# Patient Record
Sex: FEMALE | Race: WHITE | NOT HISPANIC OR LATINO | Employment: FULL TIME | URBAN - METROPOLITAN AREA
[De-identification: names, ages, dates, MRNs, and addresses within clinical notes are randomized per-mention and may not be internally consistent; named-entity substitution may affect disease eponyms.]

---

## 2017-07-23 ENCOUNTER — OFFICE VISIT (OUTPATIENT)
Dept: URGENT CARE | Facility: CLINIC | Age: 62
End: 2017-07-23

## 2017-07-23 DIAGNOSIS — Z11.1 PPD SCREENING TEST: Primary | ICD-10-CM

## 2017-07-23 PROCEDURE — 86580 TB INTRADERMAL TEST: CPT | Mod: S$GLB,,, | Performed by: EMERGENCY MEDICINE

## 2017-07-23 NOTE — PROGRESS NOTES
PPD given Patient will come back Tuesday after 3:00 pm for it to be read.    PPD Reading Note  PPD read and results entered in Epic as below:

## 2017-08-30 ENCOUNTER — OFFICE VISIT (OUTPATIENT)
Dept: URGENT CARE | Facility: CLINIC | Age: 62
End: 2017-08-30
Payer: COMMERCIAL

## 2017-08-30 VITALS
RESPIRATION RATE: 16 BRPM | WEIGHT: 210 LBS | HEART RATE: 68 BPM | TEMPERATURE: 98 F | BODY MASS INDEX: 33.75 KG/M2 | SYSTOLIC BLOOD PRESSURE: 135 MMHG | DIASTOLIC BLOOD PRESSURE: 78 MMHG | OXYGEN SATURATION: 98 % | HEIGHT: 66 IN

## 2017-08-30 DIAGNOSIS — I10 ESSENTIAL HYPERTENSION: Primary | ICD-10-CM

## 2017-08-30 PROCEDURE — 99214 OFFICE O/P EST MOD 30 MIN: CPT | Mod: S$GLB,,, | Performed by: PHYSICIAN ASSISTANT

## 2017-08-30 PROCEDURE — 3008F BODY MASS INDEX DOCD: CPT | Mod: S$GLB,,, | Performed by: PHYSICIAN ASSISTANT

## 2017-08-30 RX ORDER — CHOLECALCIFEROL (VITAMIN D3) 25 MCG
1000 TABLET ORAL DAILY
COMMUNITY
End: 2023-07-28

## 2017-08-30 RX ORDER — DOXYCYCLINE 40 MG/1
40 CAPSULE ORAL DAILY
COMMUNITY
End: 2018-02-13

## 2017-08-31 NOTE — PATIENT INSTRUCTIONS
Taking Your Blood Pressure  Blood pressure is the force of blood against the artery wall as it moves from the heart through the blood vessels. You can take your own blood pressure reading using a digital monitor. Take readings as often as your healthcare provider instructs. Take each reading at the same time of day.  Step 1. Relax    · Take your blood pressure at the same time every day, such as in the morning or evening, or at the time your healthcare provider recommends.  · Wait at least a half-hour after smoking, eating, drinking caffeinated beverages, or exercising.  · Sit comfortably at a table with both feet on the floor. Do not cross your legs or feet. Place the monitor near you.  · Rest for a few minutes before you begin.  Step 2. Wrap the cuff    · Place your arm on the table, palm up. Your arm should be at the level of your heart. Wrap the cuff around your upper arm, just above your elbow. Its best done on bare skin, not over clothing. Most cuffs will indicate where the brachial artery (the blood vessel in the middle of the arm at the inner side of the elbow) should line up with the cuff. Look in your monitor's instruction booklet for an illustration. You can also bring your cuff to your healthcare provider and have them show you how to correctly place the cuff.  · Make sure your cuff fits. If it doesnt wrap around your upper arm, order a larger cuff.  Step 3. Inflate the cuff    · Pump the cuff until the scale reads 160. If you have a self-inflating cuff, push the button that starts the pump.  · The cuff will tighten, then loosen.  · The numbers will change. When they stop changing, your blood pressure reading will appear.  · Take 2 or 3 readings one minute apart.  Step 4. Write down the results of each reading    · Write down your blood pressure numbers for each reading. Note the date and time. Keep your results in one place, such as a notebook. Even if your monitor has a built-in memory, keep a hard  copy of the readings.  · Remove the cuff from your arm. Turn off the machine.  · Share your blood pressure records with your healthcare providers at each visit.  Date Last Reviewed: 4/27/2016  © 9904-7299 [employer]. [address] [place] [address], [place] [address]. All rights reserved. This information is not intended as a substitute for professional medical care. Always follow your healthcare professional's instructions.      KEEP FU WITH CARDIOLOGIST AND PCP    START BP DIARY AS DISCUSSED

## 2017-08-31 NOTE — PROGRESS NOTES
Subjective:       Patient ID: Verena Toscano is a 62 y.o. female.    Chief Complaint: Anxiety    Patient states she took her pressure a few days ago and it was 180/100 and today it was 150/90 and a couple of other times it was high also,Patient states she is under a lot of stress because her  has terminal cancer.Patient has appointment with cardiologist on sep 12 th and a appointment with PCP at end of September.       Anxiety   Presents for initial visit. Onset was 1 to 4 weeks ago. The problem has been unchanged. Symptoms include nervous/anxious behavior. Patient reports no chest pain, nausea or shortness of breath. Primary symptoms comment: headache, high blood pressure. Symptoms occur occasionally. The quality of sleep is fair.     Past treatments include nothing.     Review of Systems   Constitution: Negative for chills and fever.   HENT: Positive for headaches. Negative for sore throat.    Eyes: Negative for blurred vision.   Cardiovascular: Negative for chest pain.   Respiratory: Negative for shortness of breath.    Skin: Negative for rash.   Musculoskeletal: Negative for back pain and joint pain.   Gastrointestinal: Negative for abdominal pain, diarrhea, nausea and vomiting.   Psychiatric/Behavioral: The patient is nervous/anxious.        Objective:      Physical Exam   Constitutional: She is oriented to person, place, and time. She appears well-developed and well-nourished. She is cooperative.  Non-toxic appearance. She does not appear ill. No distress.   HENT:   Head: Normocephalic and atraumatic.   Right Ear: Hearing, tympanic membrane, external ear and ear canal normal.   Left Ear: Hearing, tympanic membrane, external ear and ear canal normal.   Nose: Nose normal. No mucosal edema, rhinorrhea or nasal deformity. No epistaxis. Right sinus exhibits no maxillary sinus tenderness and no frontal sinus tenderness. Left sinus exhibits no maxillary sinus tenderness and no frontal sinus tenderness.    Mouth/Throat: Uvula is midline, oropharynx is clear and moist and mucous membranes are normal. No trismus in the jaw. Normal dentition. No uvula swelling. No posterior oropharyngeal erythema.   Eyes: Conjunctivae and lids are normal. Right eye exhibits no discharge. Left eye exhibits no discharge. No scleral icterus.   Sclera clear bilat   Neck: Trachea normal, normal range of motion, full passive range of motion without pain and phonation normal. Neck supple.   Cardiovascular: Normal rate, regular rhythm, normal heart sounds, intact distal pulses and normal pulses.    Pulmonary/Chest: Effort normal and breath sounds normal. No respiratory distress.   Abdominal: Soft. Normal appearance and bowel sounds are normal. She exhibits no distension, no pulsatile midline mass and no mass. There is no tenderness.   Musculoskeletal: Normal range of motion. She exhibits no edema or deformity.   Neurological: She is alert and oriented to person, place, and time. She exhibits normal muscle tone. Coordination normal.   Skin: Skin is warm, dry and intact. She is not diaphoretic. No pallor.   Psychiatric: She has a normal mood and affect. Her speech is normal and behavior is normal. Judgment and thought content normal. Cognition and memory are normal.   Nursing note and vitals reviewed.      Assessment:       1. Essential hypertension        Plan:       Verena was seen today for anxiety.    Diagnoses and all orders for this visit:    Essential hypertension    NO OFFICIAL DIAGNOSIS OF BP    START BP DIARY AS DISCUSSED FOR FU WITH CARDIOLOGIST ON 9/12

## 2018-02-13 ENCOUNTER — HOSPITAL ENCOUNTER (EMERGENCY)
Facility: HOSPITAL | Age: 63
Discharge: HOME | End: 2018-02-13
Attending: FAMILY MEDICINE
Payer: COMMERCIAL

## 2018-02-13 VITALS
RESPIRATION RATE: 16 BRPM | OXYGEN SATURATION: 99 % | WEIGHT: 205 LBS | TEMPERATURE: 98 F | BODY MASS INDEX: 32.95 KG/M2 | SYSTOLIC BLOOD PRESSURE: 125 MMHG | DIASTOLIC BLOOD PRESSURE: 58 MMHG | HEART RATE: 56 BPM | HEIGHT: 66 IN

## 2018-02-13 DIAGNOSIS — S09.90XA INJURY OF HEAD, INITIAL ENCOUNTER: Primary | ICD-10-CM

## 2018-02-13 DIAGNOSIS — T14.8XXA SUPERFICIAL LACERATION: ICD-10-CM

## 2018-02-13 PROCEDURE — 99283 EMERGENCY DEPT VISIT LOW MDM: CPT | Mod: ,,,

## 2018-02-13 PROCEDURE — 25000003 PHARM REV CODE 250

## 2018-02-13 PROCEDURE — 99284 EMERGENCY DEPT VISIT MOD MDM: CPT | Mod: 25

## 2018-02-13 RX ORDER — ONDANSETRON 4 MG/1
4 TABLET, ORALLY DISINTEGRATING ORAL EVERY 6 HOURS PRN
Qty: 12 TABLET | Refills: 0 | Status: SHIPPED | OUTPATIENT
Start: 2018-02-13 | End: 2023-07-28

## 2018-02-13 RX ORDER — DOXYCYCLINE 40 MG/1
40 CAPSULE ORAL DAILY
COMMUNITY
End: 2023-07-28

## 2018-02-13 RX ORDER — TRIAMTERENE AND HYDROCHLOROTHIAZIDE 37.5; 25 MG/1; MG/1
1 CAPSULE ORAL EVERY MORNING
COMMUNITY

## 2018-02-13 RX ORDER — ACETAMINOPHEN 500 MG
1000 TABLET ORAL EVERY 6 HOURS PRN
COMMUNITY

## 2018-02-13 RX ORDER — ONDANSETRON 4 MG/1
4 TABLET, ORALLY DISINTEGRATING ORAL
Status: COMPLETED | OUTPATIENT
Start: 2018-02-13 | End: 2018-02-13

## 2018-02-13 RX ADMIN — ONDANSETRON 4 MG: 4 TABLET, ORALLY DISINTEGRATING ORAL at 02:02
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
## 2018-02-13 NOTE — ED NOTES
Patient identifiers verified and correct for MS Toscano  C/C: Laceration, head injury  APPEARANCE: awake and alert in NAD.  SKIN: warm, dry. Laceration to top right head.  MUSCULOSKELETAL: Patient moving all extremities spontaneously, no obvious swelling or deformities noted. Ambulates independently.  RESPIRATORY: Denies shortness of breath.Respirations unlabored.   CARDIAC: Denies CP, 2+ distal pulses; no peripheral edema  ABDOMEN: S/ND/NT, Positive nausea, no emesis  : voids spontaneously, denies difficulty  Neurologic: AAO x 4; follows commands equal strength in all extremities; denies numbness/tingling. Denies dizziness Positive weakness, positive headache, right neck pain

## 2018-07-03 ENCOUNTER — CLINICAL SUPPORT (OUTPATIENT)
Dept: OCCUPATIONAL MEDICINE | Facility: CLINIC | Age: 63
End: 2018-07-03

## 2018-07-03 DIAGNOSIS — Z11.1 PPD SCREENING TEST: Primary | ICD-10-CM

## 2018-07-03 PROCEDURE — 86580 TB INTRADERMAL TEST: CPT | Mod: S$GLB,,, | Performed by: EMERGENCY MEDICINE

## 2018-07-05 LAB
TB INDURATION - 48 HR READ: NORMAL MM
TB INDURATION - 72 HR READ: NORMAL MM
TB SKIN TEST - 48 HR READ: NORMAL
TB SKIN TEST - 72 HR READ: NEGATIVE

## 2019-06-22 ENCOUNTER — CLINICAL SUPPORT (OUTPATIENT)
Dept: URGENT CARE | Facility: CLINIC | Age: 64
End: 2019-06-22

## 2019-06-22 DIAGNOSIS — Z11.1 SCREENING EXAMINATION FOR PULMONARY TUBERCULOSIS: Primary | ICD-10-CM

## 2019-06-22 PROCEDURE — 86580 POCT TB SKIN TEST: ICD-10-PCS | Mod: S$GLB,,, | Performed by: EMERGENCY MEDICINE

## 2019-06-22 PROCEDURE — 86580 TB INTRADERMAL TEST: CPT | Mod: S$GLB,,, | Performed by: EMERGENCY MEDICINE

## 2019-10-03 ENCOUNTER — OFFICE VISIT (OUTPATIENT)
Dept: URGENT CARE | Facility: CLINIC | Age: 64
End: 2019-10-03
Payer: MEDICARE

## 2019-10-03 VITALS
TEMPERATURE: 97 F | HEART RATE: 65 BPM | HEIGHT: 66 IN | BODY MASS INDEX: 33.75 KG/M2 | OXYGEN SATURATION: 98 % | WEIGHT: 210 LBS | DIASTOLIC BLOOD PRESSURE: 77 MMHG | RESPIRATION RATE: 19 BRPM | SYSTOLIC BLOOD PRESSURE: 143 MMHG

## 2019-10-03 DIAGNOSIS — S60.222A CONTUSION OF LEFT HAND, INITIAL ENCOUNTER: ICD-10-CM

## 2019-10-03 DIAGNOSIS — S69.92XA INJURY OF LEFT HAND, INITIAL ENCOUNTER: ICD-10-CM

## 2019-10-03 DIAGNOSIS — S99.922A INJURY OF LEFT FOOT, INITIAL ENCOUNTER: Primary | ICD-10-CM

## 2019-10-03 DIAGNOSIS — S93.602A FOOT SPRAIN, LEFT, INITIAL ENCOUNTER: ICD-10-CM

## 2019-10-03 PROCEDURE — 99214 OFFICE O/P EST MOD 30 MIN: CPT | Mod: S$GLB,,, | Performed by: INTERNAL MEDICINE

## 2019-10-03 PROCEDURE — 73130 XR HAND COMPLETE 3 VIEW LEFT: ICD-10-PCS | Mod: FY,LT,S$GLB, | Performed by: RADIOLOGY

## 2019-10-03 PROCEDURE — 73130 X-RAY EXAM OF HAND: CPT | Mod: FY,LT,S$GLB, | Performed by: RADIOLOGY

## 2019-10-03 PROCEDURE — 73630 XR FOOT COMPLETE 3 VIEW LEFT: ICD-10-PCS | Mod: FY,LT,S$GLB, | Performed by: RADIOLOGY

## 2019-10-03 PROCEDURE — 99214 PR OFFICE/OUTPT VISIT, EST, LEVL IV, 30-39 MIN: ICD-10-PCS | Mod: S$GLB,,, | Performed by: INTERNAL MEDICINE

## 2019-10-03 PROCEDURE — 73630 X-RAY EXAM OF FOOT: CPT | Mod: FY,LT,S$GLB, | Performed by: RADIOLOGY

## 2019-10-03 RX ORDER — NAPROXEN 500 MG/1
500 TABLET ORAL 2 TIMES DAILY WITH MEALS
Qty: 20 TABLET | Refills: 2 | Status: SHIPPED | OUTPATIENT
Start: 2019-10-03 | End: 2019-10-13

## 2019-10-03 RX ORDER — OMEPRAZOLE 10 MG/1
10 CAPSULE, DELAYED RELEASE ORAL DAILY
COMMUNITY

## 2019-10-03 NOTE — PROGRESS NOTES
"Subjective:       Patient ID: Verena Toscano is a 64 y.o. female.    Vitals:  height is 5' 6" (1.676 m) and weight is 95.3 kg (210 lb). Her oral temperature is 96.9 °F (36.1 °C). Her blood pressure is 143/77 (abnormal) and her pulse is 65. Her respiration is 19 and oxygen saturation is 98%.     Chief Complaint: Foot Injury    Patient fell this morning and hurt left foot as well as a bruise on left hand    Foot Injury    The incident occurred 1 to 3 hours ago. The incident occurred in the street. There was no injury mechanism. The pain is present in the left foot. The quality of the pain is described as aching. The pain is moderate. The pain has been constant since onset. Pertinent negatives include no inability to bear weight, loss of motion, loss of sensation, muscle weakness, numbness or tingling. She reports no foreign bodies present. The symptoms are aggravated by weight bearing. She has tried ice and acetaminophen for the symptoms. The treatment provided mild relief.       Constitution: Negative for chills, fatigue and fever.   HENT: Negative for congestion and sore throat.    Neck: Negative for painful lymph nodes.   Cardiovascular: Negative for chest pain and leg swelling.   Eyes: Negative for double vision and blurred vision.   Respiratory: Negative for cough and shortness of breath.    Gastrointestinal: Negative for nausea, vomiting and diarrhea.   Genitourinary: Negative for dysuria, frequency, urgency and history of kidney stones.   Musculoskeletal: Negative for joint pain, joint swelling, muscle cramps and muscle ache.   Skin: Negative for color change, pale, rash and bruising.   Allergic/Immunologic: Negative for seasonal allergies.   Neurological: Negative for dizziness, history of vertigo, light-headedness, passing out, headaches and numbness.   Hematologic/Lymphatic: Negative for swollen lymph nodes.   Psychiatric/Behavioral: Negative for nervous/anxious, sleep disturbance and depression. The " patient is not nervous/anxious.        Objective:      Physical Exam   Constitutional: She appears well-developed and well-nourished.   HENT:   Head: Normocephalic and atraumatic.   Eyes: Pupils are equal, round, and reactive to light. Conjunctivae and EOM are normal.   Neck: Normal range of motion. Neck supple.   Cardiovascular: Intact distal pulses.   Musculoskeletal:   Left foot tenderness distal 1st metatarsal; ecchymosis and swelling and tenderness prox phalanx L middle fonger   Nursing note and vitals reviewed.      Assessment:       1. Injury of left foot, initial encounter    2. Injury of left hand, initial encounter    3. Contusion of left hand, initial encounter    4. Foot sprain, left, initial encounter        Plan:         Injury of left foot, initial encounter  -     X-Ray Foot Complete 3 view Left; Future; Expected date: 10/03/2019    Injury of left hand, initial encounter  -     XR HAND COMPLETE 3 VIEW LEFT; Future; Expected date: 10/03/2019    Contusion of left hand, initial encounter    Foot sprain, left, initial encounter  -     NON-PNEUMATIC WALKING BOOT FOR HOME USE  -     naproxen (NAPROSYN) 500 MG tablet; Take 1 tablet (500 mg total) by mouth 2 (two) times daily with meals. for 10 days  Dispense: 20 tablet; Refill: 2

## 2020-06-18 ENCOUNTER — CLINICAL SUPPORT (OUTPATIENT)
Dept: URGENT CARE | Facility: CLINIC | Age: 65
End: 2020-06-18

## 2020-06-18 VITALS — HEART RATE: 88 BPM | OXYGEN SATURATION: 98 % | TEMPERATURE: 98 F

## 2020-06-18 DIAGNOSIS — Z11.1 SCREENING EXAMINATION FOR PULMONARY TUBERCULOSIS: Primary | ICD-10-CM

## 2020-06-18 PROCEDURE — 86580 TB INTRADERMAL TEST: CPT | Mod: S$GLB,,, | Performed by: NURSE PRACTITIONER

## 2020-06-18 PROCEDURE — 86580 POCT TB SKIN TEST: ICD-10-PCS | Mod: S$GLB,,, | Performed by: NURSE PRACTITIONER

## 2020-06-21 LAB
TB INDURATION - 48 HR READ: NORMAL
TB INDURATION - 72 HR READ: 0 MM
TB SKIN TEST - 48 HR READ: NORMAL
TB SKIN TEST - 72 HR READ: NEGATIVE

## 2020-07-12 ENCOUNTER — OFFICE VISIT (OUTPATIENT)
Dept: URGENT CARE | Facility: CLINIC | Age: 65
End: 2020-07-12
Payer: MEDICARE

## 2020-07-12 VITALS
BODY MASS INDEX: 33.75 KG/M2 | DIASTOLIC BLOOD PRESSURE: 76 MMHG | RESPIRATION RATE: 18 BRPM | SYSTOLIC BLOOD PRESSURE: 126 MMHG | TEMPERATURE: 96 F | WEIGHT: 210 LBS | HEIGHT: 66 IN | OXYGEN SATURATION: 98 % | HEART RATE: 60 BPM

## 2020-07-12 DIAGNOSIS — K13.70 ORAL LESION: ICD-10-CM

## 2020-07-12 DIAGNOSIS — K13.70 MOUTH PROBLEM: Primary | ICD-10-CM

## 2020-07-12 PROCEDURE — 99214 PR OFFICE/OUTPT VISIT, EST, LEVL IV, 30-39 MIN: ICD-10-PCS | Mod: S$GLB,,, | Performed by: FAMILY MEDICINE

## 2020-07-12 PROCEDURE — 99214 OFFICE O/P EST MOD 30 MIN: CPT | Mod: S$GLB,,, | Performed by: FAMILY MEDICINE

## 2020-07-12 RX ORDER — LORATADINE 10 MG/1
10 TABLET ORAL DAILY
COMMUNITY

## 2020-07-12 NOTE — PROGRESS NOTES
"Subjective:       Patient ID: Verena Toscano is a 65 y.o. female.    Vitals:  height is 5' 6" (1.676 m) and weight is 95.3 kg (210 lb). Her temperature is 96 °F (35.6 °C). Her blood pressure is 126/76 and her pulse is 60. Her respiration is 18 and oxygen saturation is 98%.     Chief Complaint: Sore Throat    Pt presents complaining of a sore area on the top of her mouth since 07/05. Pt suspects she burned her mouth while eating however the area has not resolved like normally it would. Pt has taken Tylenol.  No fever drainage sinus congestion dental pain.  Area not worsening.     Sore Throat   This is a new problem. Episode onset: 07/05. The problem has been unchanged. Neither side of throat is experiencing more pain than the other. There has been no fever. The pain is moderate. Pertinent negatives include no congestion, coughing, diarrhea, headaches, neck pain, shortness of breath, swollen glands, trouble swallowing or vomiting. She has tried acetaminophen for the symptoms.       Constitution: Negative for chills, fatigue and fever.   HENT: Positive for mouth sores. Negative for tongue pain, congestion, sore throat and trouble swallowing.    Neck: Negative for neck pain and painful lymph nodes.   Cardiovascular: Negative for chest pain and leg swelling.   Eyes: Negative for double vision and blurred vision.   Respiratory: Negative for cough and shortness of breath.    Gastrointestinal: Negative for nausea, vomiting and diarrhea.   Genitourinary: Negative for dysuria, frequency, urgency and history of kidney stones.   Musculoskeletal: Negative for joint pain, joint swelling, muscle cramps and muscle ache.   Skin: Negative for color change, pale, rash and bruising.   Allergic/Immunologic: Negative for seasonal allergies.   Neurological: Negative for dizziness, history of vertigo, light-headedness, passing out and headaches.   Hematologic/Lymphatic: Negative for swollen lymph nodes.   Psychiatric/Behavioral: " Negative for nervous/anxious, sleep disturbance and depression. The patient is not nervous/anxious.        Objective:      Physical Exam   Constitutional: She is oriented to person, place, and time. She appears well-developed. She is cooperative.  Non-toxic appearance. She does not appear ill. No distress.   HENT:   Head: Normocephalic and atraumatic.   Right Ear: Hearing, tympanic membrane, external ear and ear canal normal.   Left Ear: Hearing, tympanic membrane, external ear and ear canal normal.   Nose: Nose normal. No mucosal edema, rhinorrhea or nasal deformity. No epistaxis. Right sinus exhibits no maxillary sinus tenderness and no frontal sinus tenderness. Left sinus exhibits no maxillary sinus tenderness and no frontal sinus tenderness.   Mouth/Throat: Uvula is midline, oropharynx is clear and moist and mucous membranes are normal. Oral lesions present. No trismus in the jaw. Normal dentition. No dental abscesses or uvula swelling. No oropharyngeal exudate, posterior oropharyngeal edema, posterior oropharyngeal erythema or cobblestoning. No tonsillar exudate.       Eyes: Conjunctivae and lids are normal. No scleral icterus.   Neck: Trachea normal, full passive range of motion without pain and phonation normal. Neck supple. No neck rigidity. No edema and no erythema present.   Cardiovascular: Normal rate, regular rhythm, normal heart sounds and normal pulses.   Pulmonary/Chest: Effort normal and breath sounds normal. No respiratory distress. She has no decreased breath sounds. She has no rhonchi.   Abdominal: Normal appearance.   Musculoskeletal: Normal range of motion.         General: No deformity.   Neurological: She is alert and oriented to person, place, and time. She exhibits normal muscle tone. Coordination normal.   Skin: Skin is warm, dry, intact, not diaphoretic and not pale.   Psychiatric: Her speech is normal and behavior is normal. Judgment and thought content normal.   Nursing note and vitals  reviewed.        Assessment:       1. Mouth problem    2. Oral lesion        Plan:         Mouth problem  -     (Magic mouthwash) 1:1:1 Benadryl 12.5mg/5ml liq, aluminum & magnesium hydroxide-simehticone (Maalox), lidocaine viscous 2%; Swish and spit 10 mLs every 4 (four) hours as needed. for sore throat  Dispense: 360 mL; Refill: 0    Oral lesion    unclear why area not healing at this time. No signs of strep or dental abscess. no vesicles, does not appear to be candida. Advised f/u if not improving pt agreeable.     Patient Instructions   PLEASE READ YOUR DISCHARGE INSTRUCTIONS ENTIRELY AS IT CONTAINS IMPORTANT INFORMATION.    Swish and spit the magic mouthwash    Avoid very cold very hot spicy citrus foods    If still not improving please see your dentist for further evaluation    Please return or see your primary care doctor if you develop new or worsening symptoms.     You must understand that you have received an Urgent Care treatment only and that you may be released before all of your medical problems are known or treated.

## 2020-07-12 NOTE — PATIENT INSTRUCTIONS
PLEASE READ YOUR DISCHARGE INSTRUCTIONS ENTIRELY AS IT CONTAINS IMPORTANT INFORMATION.    Swish and spit the magic mouthwash    Avoid very cold very hot spicy citrus foods    If still not improving please see your dentist for further evaluation    Please return or see your primary care doctor if you develop new or worsening symptoms.     You must understand that you have received an Urgent Care treatment only and that you may be released before all of your medical problems are known or treated.

## 2020-07-14 ENCOUNTER — LAB VISIT (OUTPATIENT)
Dept: LAB | Facility: OTHER | Age: 65
End: 2020-07-14
Payer: MEDICARE

## 2020-07-14 DIAGNOSIS — Z03.818 ENCOUNTER FOR OBSERVATION FOR SUSPECTED EXPOSURE TO OTHER BIOLOGICAL AGENTS RULED OUT: ICD-10-CM

## 2020-07-14 PROCEDURE — U0003 INFECTIOUS AGENT DETECTION BY NUCLEIC ACID (DNA OR RNA); SEVERE ACUTE RESPIRATORY SYNDROME CORONAVIRUS 2 (SARS-COV-2) (CORONAVIRUS DISEASE [COVID-19]), AMPLIFIED PROBE TECHNIQUE, MAKING USE OF HIGH THROUGHPUT TECHNOLOGIES AS DESCRIBED BY CMS-2020-01-R: HCPCS

## 2020-07-17 LAB — SARS-COV-2 RNA RESP QL NAA+PROBE: NEGATIVE

## 2020-07-21 ENCOUNTER — LAB VISIT (OUTPATIENT)
Dept: LAB | Facility: OTHER | Age: 65
End: 2020-07-21
Payer: MEDICARE

## 2020-07-21 DIAGNOSIS — Z03.818 ENCOUNTER FOR OBSERVATION FOR SUSPECTED EXPOSURE TO OTHER BIOLOGICAL AGENTS RULED OUT: ICD-10-CM

## 2020-07-21 PROCEDURE — U0003 INFECTIOUS AGENT DETECTION BY NUCLEIC ACID (DNA OR RNA); SEVERE ACUTE RESPIRATORY SYNDROME CORONAVIRUS 2 (SARS-COV-2) (CORONAVIRUS DISEASE [COVID-19]), AMPLIFIED PROBE TECHNIQUE, MAKING USE OF HIGH THROUGHPUT TECHNOLOGIES AS DESCRIBED BY CMS-2020-01-R: HCPCS

## 2020-07-25 LAB — SARS-COV-2 RNA RESP QL NAA+PROBE: NEGATIVE

## 2020-07-28 ENCOUNTER — LAB VISIT (OUTPATIENT)
Dept: LAB | Facility: OTHER | Age: 65
End: 2020-07-28
Attending: INTERNAL MEDICINE
Payer: MEDICARE

## 2020-07-28 DIAGNOSIS — Z20.822 SUSPECTED COVID-19 VIRUS INFECTION: ICD-10-CM

## 2020-07-28 DIAGNOSIS — Z03.818 ENCOUNTER FOR OBSERVATION FOR SUSPECTED EXPOSURE TO OTHER BIOLOGICAL AGENTS RULED OUT: ICD-10-CM

## 2020-07-28 PROCEDURE — U0003 INFECTIOUS AGENT DETECTION BY NUCLEIC ACID (DNA OR RNA); SEVERE ACUTE RESPIRATORY SYNDROME CORONAVIRUS 2 (SARS-COV-2) (CORONAVIRUS DISEASE [COVID-19]), AMPLIFIED PROBE TECHNIQUE, MAKING USE OF HIGH THROUGHPUT TECHNOLOGIES AS DESCRIBED BY CMS-2020-01-R: HCPCS

## 2020-08-03 LAB — SARS-COV-2 RNA RESP QL NAA+PROBE: NEGATIVE

## 2020-08-04 ENCOUNTER — LAB VISIT (OUTPATIENT)
Dept: LAB | Facility: OTHER | Age: 65
End: 2020-08-04
Payer: MEDICARE

## 2020-08-04 DIAGNOSIS — Z03.818 ENCOUNTER FOR OBSERVATION FOR SUSPECTED EXPOSURE TO OTHER BIOLOGICAL AGENTS RULED OUT: ICD-10-CM

## 2020-08-04 DIAGNOSIS — Z20.822 SUSPECTED COVID-19 VIRUS INFECTION: ICD-10-CM

## 2020-08-04 PROCEDURE — U0003 INFECTIOUS AGENT DETECTION BY NUCLEIC ACID (DNA OR RNA); SEVERE ACUTE RESPIRATORY SYNDROME CORONAVIRUS 2 (SARS-COV-2) (CORONAVIRUS DISEASE [COVID-19]), AMPLIFIED PROBE TECHNIQUE, MAKING USE OF HIGH THROUGHPUT TECHNOLOGIES AS DESCRIBED BY CMS-2020-01-R: HCPCS

## 2020-08-05 LAB — SARS-COV-2 RNA RESP QL NAA+PROBE: NOT DETECTED

## 2021-01-02 ENCOUNTER — CLINICAL SUPPORT (OUTPATIENT)
Dept: URGENT CARE | Facility: CLINIC | Age: 66
End: 2021-01-02
Payer: MEDICARE

## 2021-01-02 DIAGNOSIS — Z20.822 EXPOSURE TO COVID-19 VIRUS: Primary | ICD-10-CM

## 2021-01-02 LAB
CTP QC/QA: YES
SARS-COV-2 RDRP RESP QL NAA+PROBE: NEGATIVE

## 2021-01-02 PROCEDURE — U0002 COVID-19 LAB TEST NON-CDC: HCPCS | Mod: QW,CR,S$GLB, | Performed by: EMERGENCY MEDICINE

## 2021-01-02 PROCEDURE — U0002: ICD-10-PCS | Mod: QW,CR,S$GLB, | Performed by: EMERGENCY MEDICINE

## 2021-04-16 ENCOUNTER — PATIENT MESSAGE (OUTPATIENT)
Dept: RESEARCH | Facility: HOSPITAL | Age: 66
End: 2021-04-16

## 2021-07-01 ENCOUNTER — PATIENT MESSAGE (OUTPATIENT)
Dept: ADMINISTRATIVE | Facility: OTHER | Age: 66
End: 2021-07-01

## 2021-07-23 ENCOUNTER — TELEPHONE (OUTPATIENT)
Dept: NEUROLOGY | Facility: CLINIC | Age: 66
End: 2021-07-23

## 2021-09-27 ENCOUNTER — OFFICE VISIT (OUTPATIENT)
Dept: NEUROLOGY | Facility: CLINIC | Age: 66
End: 2021-09-27
Payer: MEDICARE

## 2021-09-27 DIAGNOSIS — R41.9 COGNITIVE COMPLAINTS: ICD-10-CM

## 2021-09-27 DIAGNOSIS — F43.22 ADJUSTMENT DISORDER WITH ANXIOUS MOOD: Primary | ICD-10-CM

## 2021-09-27 PROCEDURE — 90791 PR PSYCHIATRIC DIAGNOSTIC EVALUATION: ICD-10-PCS | Mod: 95,,, | Performed by: PSYCHIATRY & NEUROLOGY

## 2021-09-27 PROCEDURE — 90791 PSYCH DIAGNOSTIC EVALUATION: CPT | Mod: 95,,, | Performed by: PSYCHIATRY & NEUROLOGY

## 2021-09-27 PROCEDURE — 99499 UNLISTED E&M SERVICE: CPT | Mod: 95,,, | Performed by: PSYCHIATRY & NEUROLOGY

## 2021-09-27 PROCEDURE — 99499 NO LOS: ICD-10-PCS | Mod: 95,,, | Performed by: PSYCHIATRY & NEUROLOGY

## 2021-10-04 ENCOUNTER — OFFICE VISIT (OUTPATIENT)
Dept: NEUROLOGY | Facility: CLINIC | Age: 66
End: 2021-10-04
Payer: MEDICARE

## 2021-10-04 DIAGNOSIS — F43.22 ADJUSTMENT DISORDER WITH ANXIOUS MOOD: ICD-10-CM

## 2021-10-04 DIAGNOSIS — R41.9 COGNITIVE COMPLAINTS: Primary | ICD-10-CM

## 2021-10-04 PROCEDURE — 96132 NRPSYC TST EVAL PHYS/QHP 1ST: CPT | Mod: ,,, | Performed by: PSYCHIATRY & NEUROLOGY

## 2021-10-04 PROCEDURE — 99499 UNLISTED E&M SERVICE: CPT | Mod: S$PBB,,, | Performed by: PSYCHIATRY & NEUROLOGY

## 2021-10-04 PROCEDURE — 99499 NO LOS: ICD-10-PCS | Mod: S$PBB,,, | Performed by: PSYCHIATRY & NEUROLOGY

## 2021-10-04 PROCEDURE — 96133 PR NEUROPSYCHOLOGIC TEST EVAL SVCS, EA ADDTL HR: ICD-10-PCS | Mod: ,,, | Performed by: PSYCHIATRY & NEUROLOGY

## 2021-10-04 PROCEDURE — 96133 NRPSYC TST EVAL PHYS/QHP EA: CPT | Mod: ,,, | Performed by: PSYCHIATRY & NEUROLOGY

## 2021-10-04 PROCEDURE — 96139 PSYCL/NRPSYC TST TECH EA: CPT | Mod: ,,, | Performed by: PSYCHIATRY & NEUROLOGY

## 2021-10-04 PROCEDURE — 96139 PR PSYCH/NEUROPSYCH TEST ADMIN/SCORING, BY TECH, 2+ TESTS, EA ADDTL 30 MIN: ICD-10-PCS | Mod: ,,, | Performed by: PSYCHIATRY & NEUROLOGY

## 2021-10-04 PROCEDURE — 96132 PR NEUROPSYCHOLOGIC TEST EVAL SVCS, 1ST HR: ICD-10-PCS | Mod: ,,, | Performed by: PSYCHIATRY & NEUROLOGY

## 2021-10-04 PROCEDURE — 96138 PSYCL/NRPSYC TECH 1ST: CPT | Mod: ,,, | Performed by: PSYCHIATRY & NEUROLOGY

## 2021-10-04 PROCEDURE — 96138 PR PSYCH/NEUROPSYCH TEST ADMIN/SCORING, BY TECH, 2+ TESTS, 1ST 30 MIN: ICD-10-PCS | Mod: ,,, | Performed by: PSYCHIATRY & NEUROLOGY

## 2021-10-19 ENCOUNTER — OFFICE VISIT (OUTPATIENT)
Dept: NEUROLOGY | Facility: CLINIC | Age: 66
End: 2021-10-19
Payer: MEDICARE

## 2021-10-19 ENCOUNTER — PATIENT MESSAGE (OUTPATIENT)
Dept: NEUROLOGY | Facility: CLINIC | Age: 66
End: 2021-10-19

## 2021-10-19 DIAGNOSIS — F43.22 ADJUSTMENT DISORDER WITH ANXIOUS MOOD: ICD-10-CM

## 2021-10-19 DIAGNOSIS — R41.9 COGNITIVE COMPLAINTS: Primary | ICD-10-CM

## 2021-10-19 PROCEDURE — 99499 UNLISTED E&M SERVICE: CPT | Mod: 95,,, | Performed by: PSYCHIATRY & NEUROLOGY

## 2021-10-19 PROCEDURE — 99499 NO LOS: ICD-10-PCS | Mod: 95,,, | Performed by: PSYCHIATRY & NEUROLOGY

## 2021-11-12 ENCOUNTER — CLINICAL SUPPORT (OUTPATIENT)
Dept: URGENT CARE | Facility: CLINIC | Age: 66
End: 2021-11-12

## 2021-11-12 DIAGNOSIS — Z11.1 ENCOUNTER FOR PPD TEST: Primary | ICD-10-CM

## 2021-11-12 PROCEDURE — 86580 POCT TB SKIN TEST: ICD-10-PCS | Mod: S$GLB,,, | Performed by: NURSE PRACTITIONER

## 2021-11-12 PROCEDURE — 86580 TB INTRADERMAL TEST: CPT | Mod: S$GLB,,, | Performed by: NURSE PRACTITIONER

## 2021-11-14 LAB
TB INDURATION - 48 HR READ: 0 MM
TB INDURATION - 72 HR READ: NORMAL
TB SKIN TEST - 48 HR READ: NEGATIVE
TB SKIN TEST - 72 HR READ: NORMAL

## 2022-07-22 ENCOUNTER — TELEPHONE (OUTPATIENT)
Dept: INFUSION THERAPY | Facility: HOSPITAL | Age: 67
End: 2022-07-22
Payer: MEDICARE

## 2022-08-22 ENCOUNTER — OFFICE VISIT (OUTPATIENT)
Dept: PSYCHIATRY | Facility: CLINIC | Age: 67
End: 2022-08-22
Payer: MEDICARE

## 2022-08-22 DIAGNOSIS — F43.22 ADJUSTMENT DISORDER WITH ANXIOUS MOOD: Primary | ICD-10-CM

## 2022-08-22 PROCEDURE — 99999 PR PBB SHADOW E&M-EST. PATIENT-LVL I: CPT | Mod: PBBFAC,,, | Performed by: PSYCHOLOGIST

## 2022-08-22 PROCEDURE — 99999 PR PBB SHADOW E&M-EST. PATIENT-LVL I: ICD-10-PCS | Mod: PBBFAC,,, | Performed by: PSYCHOLOGIST

## 2022-08-22 PROCEDURE — 90791 PR PSYCHIATRIC DIAGNOSTIC EVALUATION: ICD-10-PCS | Mod: ,,, | Performed by: PSYCHOLOGIST

## 2022-08-22 PROCEDURE — 90791 PSYCH DIAGNOSTIC EVALUATION: CPT | Mod: ,,, | Performed by: PSYCHOLOGIST

## 2022-08-22 PROCEDURE — 99211 OFF/OP EST MAY X REQ PHY/QHP: CPT | Mod: PBBFAC | Performed by: PSYCHOLOGIST

## 2022-08-22 NOTE — PROGRESS NOTES
"PSYCHO-ONCOLOGY INTAKE    Diagnostic Interview - CPT 77331    Date: 8/22/2022  Site: [facility]     Evaluation Length (direct face-to-face time):  1.5 hours     Referral Source: 's (Nakul Mazariegos) oncologist is Bebe   PCP: Lai Rivas Jr, MD    Clinical status of patient: Outpatient    Verena Toscano, a 67 y.o. female, seen for initial evaluation visit.  Met with patient.    Chief complaint/reason for encounter: adjustment to caregiving, anxiety and interpersonal    Medical/Surgical History:    Patient Active Problem List   Diagnosis    Cough    Cognitive complaints    Adjustment disorder with anxious mood       Health Behaviors:       ETOH Use: Yes (rare and social)       Tobacco Use: No   Illicit Drug Use:  No     Prescription Misuse:No   Caffeine: minimal   Exercise:The patient maintains a regular, healthy exercise program. (5x/week Jazzercise)    Past Psychiatric History:   Inpatient treatment: No     Outpatient treatment: Yes (Alma Martines, PhD for 13 years; Arnie Zapata briefly for "parts work")    Prior substance abuse treatment: No     Suicide Attempts: No     Psychotropic Medications:  Current: none           Current medications as per below, allergies reviewed in chart.    Current Outpatient Medications   Medication    (Magic mouthwash) 1:1:1 Benadryl 12.5mg/5ml liq, aluminum & magnesium hydroxide-simehticone (Maalox), lidocaine viscous 2%    acetaminophen (TYLENOL) 500 MG tablet    doxycycline (ORACEA) 40 mg capsule    estradiol (ESTRACE) 1 MG tablet    loratadine (CLARITIN) 10 mg tablet    NEXIUM 40 mg capsule    omeprazole (PRILOSEC) 10 MG capsule    ondansetron (ZOFRAN-ODT) 4 MG TbDL    triamterene-hydrochlorothiazide 37.5-25 mg (DYAZIDE) 37.5-25 mg per capsule    vitamin D 1000 units Tab    vitamin E 100 UNIT capsule     No current facility-administered medications for this visit.      Social situation/Stressors: Verena Toscano lives with her  (Nakul) in [place], " "[place].  She is a part-time clinical psychologist (2 days/week). Until April 2022, she was both maintaining a clinical practice and working as a care manager (former RN)..  Verena Toscano has been  2x (currently for 32 years) and has 2 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. Her 26 year old son is a  in [place]. Her 30 year old daughter is a  in [place].  Her spouse is a retired .  She is an only child. The patient reports good social support from several close friends. Verena Toscano's hobbies include Jazzercise, travel.  Additional stressors: 's illness (see below)   Financial strain ([employer] loan from [employer], helping children financially)   Sister in law (Abby) "talks about me"    EtOH overuse    Strengths:Housing stability, Able to vocalize needs, Motivation, readiness for change, Setting and pursuing goals, hopes, dreams, aspirations, Vocational interests, hobbies and/or talents, and Interpersonal relationships and supports available - family, relatives, friends  Liabilities: Complicated medical illness in  and Financial strain    Current Evaluation:     Mental Status Exam: Verena Toscano arrived promptly for the assessment session. The patient was fully cooperative throughout the interview and was an adequate historian   Appearance: age appropriate, appropriately  dressed, well groomed  Behavior/Cooperation: friendly and cooperative  Speech: normal in rate, volume, and tone and appropriate quality, quantity and organization of sentences  Mood: anxious, dysthymic  Affect: anxious  Thought Process: goal-directed, logical  Thought Content: normal, no suicidality, no homicidality, delusions, or paranoia;did not appear to be responding to internal stimuli during the interview.   Orientation: grossly intact  Memory: Grossly intact  Attention Span/Concentration: Attends to interview without distraction; reports subjective difficulty  Fund of Knowledge: " "average  Estimate of Intelligence: average from verbal skills and history  Cognition: grossly intact  Insight: patient has awareness of illness; good insight into own behavior and behavior of others  Judgment: the patient's behavior is adequate to circumstances    Distress thermometer:   DISTRESS SCREENING 8/15/2022   Distress Score 8   Practical Problems Insurance/Financial;Work/School   Family Problems Dealing with Partner;Family Health Issues   Emotional Problems Fears;Sadness;Worry   Spritual / Taoist Concerns No   Physical Problems Memory/Concentration;Sexual   Other Problems Insecurity, negativity          History of present illness:   was diagnosed with urothelial cancer in 2017 with a 1.5 year survival prognosis. He initially saw Dr. Beltran/Dr. Lake, but was not happy with his care. [place] [facility] Dr. Spence/Dianna Hickey NP.  He "did great" on immunotherapy for almost 3 years, then transitioned to a new medication due to PD. He had significant side effects with the new medication (neuropathy, fatigue) and eventually went on a treatment break (7/2021). He has done well since that time and remains on break.     Verena Toscano has adjusted to his illness with moderate difficulty primarily through active coping strategies, focus on alternative activities, and focus on work. She is "very thankful" he is still here, despite initial prognosis. She is "so in love" with him.  She has engaged in appropriate information gathering. 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  The patient has good family/friend support.  Her  is coping with difficulty with the diagnosis/treatment/prognosis. He is struggling to accept his limitations (can't go to festivals) and changes in performance status (decreased ability to golf). He is chafing against her supervision ("tired of being told what to do"). He " "recently told a clinician that she "nags all the time," which was very hurtful for her ("felt like my world was crumbling").  Illness-related psychosocial stressors include financial strain  and changes in ability to engage in leisure activities.  She reports they were "not financially prepared for prison" and both she and her  are anxious about finances. 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 They are engaging in couples therapy with May Vo LCSW.    Areas assessed:   Mood: Depression: depressed mood, diminished interest, weight loss, worthlessness/guilt, poor concentration, and tearfulness; "usually really happy, but that's hard now";  prior depression:during a breakup in graduate school ;  mild, fleeting thoughts of death, but no intent or plan in past or now ; PHQ-9=7  Jennifer: Denies  Psychosis: Denies   Anxiety: Feeling nervous, anxious, or on edge, Uncontrollable worry (about finances, his health), Excessive worry (interfering with enjoyment, mood), Difficulty relaxing, Restlessness, Irritability, and Fear of unknown; no prior;  DEANNA-7=9  Learning history: Patient reports her parents were very critical, often mentioned "not wanting to have kids," lead to abandonment fears ("if I mess up, maybe they'll kick me out"); constantly insecure- keeping everyone else happy  Substance abuse: denied  Cognitive functioning: testing with Dr. Wilde 10/2021- no cognitive impairment noted, self-reported difficulty likely due to stress/overwhelm  Health behaviors: noncontributory        Assessment - Diagnosis - Goals:       ICD-10-CM ICD-9-CM   1. Adjustment disorder with anxious mood  F43.22 309.24     Plan:individual psychotherapy    Summary and Recommendations  Verena Toscano is a 67 y.o. female self-referred for psychological evaluation and treatment.  Ms. Toscano appears to be " coping with difficulty with the impact of her 's illness on their relationship. She/They will continue couples counseling. She will see me for individual caregiver support. Prior to next visit she will note inciting incidents for distressing thoughts (and those thoughts). She will continue to journal and to exercise daily.       Xavier Dudley, PhD  Clinical Psychologist  [place] License #[id number]  [place] License #[id number]

## 2022-08-29 ENCOUNTER — TELEPHONE (OUTPATIENT)
Dept: INFUSION THERAPY | Facility: HOSPITAL | Age: 67
End: 2022-08-29
Payer: MEDICARE

## 2022-08-29 ENCOUNTER — PATIENT MESSAGE (OUTPATIENT)
Dept: PSYCHIATRY | Facility: CLINIC | Age: 67
End: 2022-08-29
Payer: MEDICARE

## 2022-09-15 ENCOUNTER — OFFICE VISIT (OUTPATIENT)
Dept: PSYCHIATRY | Facility: CLINIC | Age: 67
End: 2022-09-15
Payer: MEDICARE

## 2022-09-15 DIAGNOSIS — F43.22 ADJUSTMENT DISORDER WITH ANXIOUS MOOD: Primary | ICD-10-CM

## 2022-09-15 PROCEDURE — 90834 PR PSYCHOTHERAPY W/PATIENT, 45 MIN: ICD-10-PCS | Mod: ,,, | Performed by: PSYCHOLOGIST

## 2022-09-15 PROCEDURE — 90834 PSYTX W PT 45 MINUTES: CPT | Mod: ,,, | Performed by: PSYCHOLOGIST

## 2022-09-15 PROCEDURE — 99999 PR PBB SHADOW E&M-EST. PATIENT-LVL I: ICD-10-PCS | Mod: PBBFAC,,, | Performed by: PSYCHOLOGIST

## 2022-09-15 PROCEDURE — 99211 OFF/OP EST MAY X REQ PHY/QHP: CPT | Mod: PBBFAC | Performed by: PSYCHOLOGIST

## 2022-09-15 PROCEDURE — 99999 PR PBB SHADOW E&M-EST. PATIENT-LVL I: CPT | Mod: PBBFAC,,, | Performed by: PSYCHOLOGIST

## 2022-09-15 NOTE — PROGRESS NOTES
PSYCHO-ONCOLOGY NOTE/ Individual Psychotherapy     Date: 9/15/2022   Site:  [facility]        Therapeutic Intervention: Met with patient.  Outpatient - Behavior modifying psychotherapy 45 min - CPT code 16335    Patient was last seen by me on 8/22/2022    Problem list  Patient Active Problem List   Diagnosis    Cough    Cognitive complaints    Adjustment disorder with anxious mood       Chief complaint/reason for encounter: anxiety   Met with patient to evaluate psychosocial adaptation to caregiving    Current Medications  Current Outpatient Medications   Medication    (Magic mouthwash) 1:1:1 Benadryl 12.5mg/5ml liq, aluminum & magnesium hydroxide-simehticone (Maalox), lidocaine viscous 2%    acetaminophen (TYLENOL) 500 MG tablet    doxycycline (ORACEA) 40 mg capsule    estradiol (ESTRACE) 1 MG tablet    loratadine (CLARITIN) 10 mg tablet    NEXIUM 40 mg capsule    omeprazole (PRILOSEC) 10 MG capsule    ondansetron (ZOFRAN-ODT) 4 MG TbDL    triamterene-hydrochlorothiazide 37.5-25 mg (DYAZIDE) 37.5-25 mg per capsule    vitamin D 1000 units Tab    vitamin E 100 UNIT capsule     No current facility-administered medications for this visit.       Objective:  Verena Toscano arrived promptly for the session.  Ms. Toscano was independently ambulatory at the time of session. The patient was fully cooperative throughout the session.  Appearance: age appropriate, casually  dressed, well groomed  Behavior/Cooperation: friendly and cooperative  Speech: normal in rate, volume, and tone and appropriate quality, quantity and organization of sentences  Mood: anxious  Affect: dysphoric  Thought Process: goal-directed, logical  Thought Content: normal,  No delusions or paranoia; did not appear to be responding to internal stimuli during the session  Orientation: grossly intact  Memory: Grossly intact  Attention Span/Concentration: Attends to session without distraction; reports no difficulty  Fund of Knowledge:  average  Estimate of Intelligence: average from verbal skills and history  Cognition: grossly intact  Insight: patient has awareness of illness; good insight into own behavior and behavior of others  Judgment: the patient's behavior is adequate to circumstances    Interval history and content of current session: Patient discussed events and activities since the time of last visit. Discussed current adaptation to 's disease and treatment status and 's adaptation to disease and treatment status. Reports to be coping with moderate difficulty. Evaluated cognitive response, paying particular attention to negative intrusive thoughts of a persistent and detrimental nature. Thoughts of this type are in evidence with moderate distress. Provided cognitive behavioral therapy to address negative cognitions.  (Core beliefs around having to keep others happy and not being allowed to have opinions challenged)    Identified and evaluated psychosocial and environmental stressors secondary to 's diagnosis and treatment (cognitive changes, ED).  Examined proactive behaviors that may be implemented to minimize or ameliorate psychosocial stressors. Discussed patient's desire to alert 's medical team about perceived cognitive changes. Patient also discussed sexual changes-   Enjoyable and fulfilling sex life prior to 's illness. Historical use of Viagra Cialis. 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 Since Padcev, his ability to have an erection (or ejaculate without an erection) is almost gone. She feels his interest is low (although they still cuddle and show other physical affection). She wonders about his thinking about their change in interaction and whether he is interested in other potential assistive devices for intercourse. (Her drive has not changed, is comfortable masturbating, unsure how he thinks about her  sexual interest).     Risk parameters:   Patient reports no suicidal ideation  Patient reports no homicidal ideation  Patient reports no self-injurious behavior  Patient reports no violent behavior   Safety needs:  None at this time      Verbal deficits: None     Patient's response to intervention:The patient's response to intervention is accepting, motivated.     Progress toward goals: Progress as Expected        Patient reported outcomes:      Distress thermometer:   DISTRESS SCREENING 9/15/2022 8/15/2022   Distress Score 4 8   Practical Problems - Insurance/Financial;Work/School   Family Problems - Dealing with Partner;Family Health Issues   Emotional Problems - Fears;Sadness;Worry   Spritual / Spiritism Concerns - No   Physical Problems - Memory/Concentration;Sexual   Other Problems - Insecurity, negativity           PHQ-9=3; Initial visit: 7            DEANNA-7=6 Initial visit:6   GAD7 8/15/2022   1. Feeling nervous, anxious, or on edge? 2   2. Not being able to stop or control worrying? 1   3. Worrying too much about different things? 1   4. Trouble relaxing? 1   5. Being so restless that it is hard to sit still? 1   6. Becoming easily annoyed or irritable? 2   7. Feeling afraid as if something awful might happen? 1   DEANNA-7 Score 9          Client Strengths: verbal, intelligent, successful, good social support, good insight, commitment to wellness      Treatment Plan:individual psychotherapy  Target symptoms: adjustment  Why chosen therapy is appropriate versus another modality: relevant to diagnosis, patient responds to this modality, evidence based practice  Outcome monitoring methods: self-report, checklist/rating scale  Therapeutic intervention type: behavior modifying psychotherapy  Prognosis: Good    Goals from last visit: Met   Behavioral goals prior to next visit:    Exercise:   Stress management:   Social engagement:   Nutrition:   Smoking Cessation:   Therapy: journaling    Message 's team about  potential cognitive changes    Return to clinic: 2 weeks     Length of Service (minutes direct face-to-face contact): 45    Diagnosis:     ICD-10-CM ICD-9-CM   1. Adjustment disorder with anxious mood  F43.22 309.24       Xavier Núñez, PhD  [place] License #[id number]  [place] License #[id number]

## 2022-09-26 ENCOUNTER — CLINICAL SUPPORT (OUTPATIENT)
Dept: PRIMARY CARE CLINIC | Facility: CLINIC | Age: 67
End: 2022-09-26
Payer: MEDICARE

## 2022-09-26 DIAGNOSIS — Z23 NEED FOR PROPHYLACTIC VACCINATION AND INOCULATION AGAINST INFLUENZA: Primary | ICD-10-CM

## 2022-09-26 PROCEDURE — G0008 ADMIN INFLUENZA VIRUS VAC: HCPCS | Mod: PBBFAC,PN

## 2022-09-26 NOTE — PROGRESS NOTES
FTwo patient identifiers verified.  Allergies reviewed.  Flu vaccine IM administered to left deltoid per MD order.  Patient tolerated injection well; no redness, bleeding, or bruising noted to injection site.  Patient instructed to remain in clinic setting for 15 minutes.

## 2022-09-28 ENCOUNTER — OFFICE VISIT (OUTPATIENT)
Dept: PSYCHIATRY | Facility: CLINIC | Age: 67
End: 2022-09-28
Payer: MEDICARE

## 2022-09-28 DIAGNOSIS — F43.22 ADJUSTMENT DISORDER WITH ANXIOUS MOOD: Primary | ICD-10-CM

## 2022-09-28 PROCEDURE — 99999 PR PBB SHADOW E&M-EST. PATIENT-LVL I: ICD-10-PCS | Mod: PBBFAC,,, | Performed by: PSYCHOLOGIST

## 2022-09-28 PROCEDURE — 99999 PR PBB SHADOW E&M-EST. PATIENT-LVL I: CPT | Mod: PBBFAC,,, | Performed by: PSYCHOLOGIST

## 2022-09-28 PROCEDURE — 90834 PSYTX W PT 45 MINUTES: CPT | Mod: ,,, | Performed by: PSYCHOLOGIST

## 2022-09-28 PROCEDURE — 90834 PR PSYCHOTHERAPY W/PATIENT, 45 MIN: ICD-10-PCS | Mod: ,,, | Performed by: PSYCHOLOGIST

## 2022-09-28 PROCEDURE — 99211 OFF/OP EST MAY X REQ PHY/QHP: CPT | Mod: PBBFAC | Performed by: PSYCHOLOGIST

## 2022-09-28 NOTE — PROGRESS NOTES
PSYCHO-ONCOLOGY NOTE/ Individual Psychotherapy     Date: 9/28/2022   Site:  [facility]        Therapeutic Intervention: Met with patient.  Outpatient - Behavior modifying psychotherapy 45 min - CPT code 18660  The patient's last visit with me was on 9/15/2022.      Problem list  Patient Active Problem List   Diagnosis    Cough    Cognitive complaints    Adjustment disorder with anxious mood       Chief complaint/reason for encounter: anxiety   Met with patient to evaluate psychosocial adaptation to caregiving    Current Medications  Current Outpatient Medications   Medication    (Magic mouthwash) 1:1:1 Benadryl 12.5mg/5ml liq, aluminum & magnesium hydroxide-simehticone (Maalox), lidocaine viscous 2%    acetaminophen (TYLENOL) 500 MG tablet    doxycycline (ORACEA) 40 mg capsule    estradiol (ESTRACE) 1 MG tablet    loratadine (CLARITIN) 10 mg tablet    NEXIUM 40 mg capsule    omeprazole (PRILOSEC) 10 MG capsule    ondansetron (ZOFRAN-ODT) 4 MG TbDL    triamterene-hydrochlorothiazide 37.5-25 mg (DYAZIDE) 37.5-25 mg per capsule    vitamin D 1000 units Tab    vitamin E 100 UNIT capsule     No current facility-administered medications for this visit.       Objective:  Verena Toscano arrived promptly for the session.  Ms. Toscano was independently ambulatory at the time of session. The patient was fully cooperative throughout the session.  Appearance: age appropriate, casually  dressed, well groomed  Behavior/Cooperation: friendly and cooperative  Speech: normal in rate, volume, and tone and appropriate quality, quantity and organization of sentences  Mood: anxious  Affect: dysphoric, tearful  Thought Process: goal-directed, logical  Thought Content: normal,  No delusions or paranoia; did not appear to be responding to internal stimuli during the session  Orientation: grossly intact  Memory: Grossly intact  Attention Span/Concentration: Attends to session without distraction; reports no difficulty  Fund of  "Knowledge: average  Estimate of Intelligence: average from verbal skills and history  Cognition: grossly intact  Insight: patient has awareness of illness; good insight into own behavior and behavior of others  Judgment: the patient's behavior is adequate to circumstances    Interval history and content of current session: Patient discussed events and activities since the time of last visit. Discussed current adaptation to 's disease and treatment status and 's adaptation to disease and treatment status. Reports to be coping with moderate difficulty. Evaluated cognitive response, paying particular attention to negative intrusive thoughts of a persistent and detrimental nature. Thoughts of this type are in evidence with moderate distress. Provided cognitive behavioral therapy to address negative cognitions.  (Core beliefs around having to keep others happy and not being allowed to have opinions challenged)  Additional area of focus- sadness around daughter's wedding planning and core beliefs around "losing her" to her in-laws.    Identified and evaluated psychosocial and environmental stressors secondary to 's diagnosis and treatment (cognitive changes, ED).  Examined proactive behaviors that may be implemented to minimize or ameliorate psychosocial stressors. Discussed patient's desire to alert 's medical team about perceived cognitive changes.    Prior: discussion of sexual changes: Enjoyable and fulfilling sex life prior to 's illness. Historical use of Viagra, Cialis. 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 Since Padcev, his ability to have an erection (or ejaculate without an erection) is almost gone. She feels his interest is low (although they still cuddle and show other physical affection). She wonders about his thinking about their change in interaction and whether he is interested in other " potential assistive devices for intercourse. (Her drive has not changed, is comfortable masturbating, unsure how he thinks about her sexual interest).     Risk parameters:   Patient reports no suicidal ideation  Patient reports no homicidal ideation  Patient reports no self-injurious behavior  Patient reports no violent behavior   Safety needs:  None at this time      Verbal deficits: None     Patient's response to intervention:The patient's response to intervention is accepting, motivated.     Progress toward goals: Progress as Expected        Patient reported outcomes:      Distress thermometer:   DISTRESS SCREENING 9/28/2022 9/15/2022 8/15/2022   Distress Score 4 4 8   Practical Problems - - Insurance/Financial;Work/School   Family Problems - - Dealing with Partner;Family Health Issues   Emotional Problems - - Fears;Sadness;Worry   Spiritual / Rastafarian Concerns - - No   Physical Problems - - Memory/Concentration;Sexual   Other Problems - - Insecurity, negativity           PHQ-9=2; Initial visit: 7            DEANNA-7=4 Initial visit:6   GAD7 8/15/2022   1. Feeling nervous, anxious, or on edge? 2   2. Not being able to stop or control worrying? 1   3. Worrying too much about different things? 1   4. Trouble relaxing? 1   5. Being so restless that it is hard to sit still? 1   6. Becoming easily annoyed or irritable? 2   7. Feeling afraid as if something awful might happen? 1   DEANNA-7 Score 9          Client Strengths: verbal, intelligent, successful, good social support, good insight, commitment to wellness      Treatment Plan:individual psychotherapy  Target symptoms: adjustment  Why chosen therapy is appropriate versus another modality: relevant to diagnosis, patient responds to this modality, evidence based practice  Outcome monitoring methods: self-report, checklist/rating scale  Therapeutic intervention type: behavior modifying psychotherapy  Prognosis: Good    Goals from last visit: Met   Behavioral goals prior  to next visit:    Exercise:   Stress management:   Social engagement:   Nutrition:   Smoking Cessation:   Therapy: journaling- about perfectionism/ toxic positivity- tearfulness during visit about accepting his right to be angry    Message 's team about potential cognitive changes    Return to clinic: 2 weeks     Length of Service (minutes direct face-to-face contact): 45    Diagnosis:     ICD-10-CM ICD-9-CM   1. Adjustment disorder with anxious mood  F43.22 309.24       Xavier Núñez, PhD  [place] License #[id number]  [place] License #[id number]

## 2022-10-12 ENCOUNTER — OFFICE VISIT (OUTPATIENT)
Dept: PSYCHIATRY | Facility: CLINIC | Age: 67
End: 2022-10-12
Payer: MEDICARE

## 2022-10-12 DIAGNOSIS — F43.22 ADJUSTMENT DISORDER WITH ANXIOUS MOOD: Primary | ICD-10-CM

## 2022-10-12 PROCEDURE — 90834 PSYTX W PT 45 MINUTES: CPT | Mod: ,,, | Performed by: PSYCHOLOGIST

## 2022-10-12 PROCEDURE — 99212 OFFICE O/P EST SF 10 MIN: CPT | Mod: PBBFAC | Performed by: PSYCHOLOGIST

## 2022-10-12 PROCEDURE — 99999 PR PBB SHADOW E&M-EST. PATIENT-LVL II: CPT | Mod: PBBFAC,,, | Performed by: PSYCHOLOGIST

## 2022-10-12 PROCEDURE — 99999 PR PBB SHADOW E&M-EST. PATIENT-LVL II: ICD-10-PCS | Mod: PBBFAC,,, | Performed by: PSYCHOLOGIST

## 2022-10-12 PROCEDURE — 90834 PR PSYCHOTHERAPY W/PATIENT, 45 MIN: ICD-10-PCS | Mod: ,,, | Performed by: PSYCHOLOGIST

## 2022-10-12 NOTE — PROGRESS NOTES
PSYCHO-ONCOLOGY NOTE/ Individual Psychotherapy     Date: 10/12/2022   Site:  [facility]        Therapeutic Intervention: Met with patient.  Outpatient - Behavior modifying psychotherapy 45 min - CPT code 43016  The patient's last visit with me was on 9/15/2022.      Problem list  Patient Active Problem List   Diagnosis    Cough    Cognitive complaints    Adjustment disorder with anxious mood       Chief complaint/reason for encounter: anxiety   Met with patient to evaluate psychosocial adaptation to caregiving    Current Medications  Current Outpatient Medications   Medication    (Magic mouthwash) 1:1:1 Benadryl 12.5mg/5ml liq, aluminum & magnesium hydroxide-simehticone (Maalox), lidocaine viscous 2%    acetaminophen (TYLENOL) 500 MG tablet    doxycycline (ORACEA) 40 mg capsule    estradiol (ESTRACE) 1 MG tablet    loratadine (CLARITIN) 10 mg tablet    NEXIUM 40 mg capsule    omeprazole (PRILOSEC) 10 MG capsule    ondansetron (ZOFRAN-ODT) 4 MG TbDL    triamterene-hydrochlorothiazide 37.5-25 mg (DYAZIDE) 37.5-25 mg per capsule    vitamin D 1000 units Tab    vitamin E 100 UNIT capsule     No current facility-administered medications for this visit.       Objective:  Verena Toscano arrived promptly for the session.  Ms. Toscano was independently ambulatory at the time of session. The patient was fully cooperative throughout the session.  Appearance: age appropriate, casually  dressed, well groomed  Behavior/Cooperation: friendly and cooperative  Speech: normal in rate, volume, and tone and appropriate quality, quantity and organization of sentences  Mood: anxious  Affect: dysphoric, tearful  Thought Process: goal-directed, logical  Thought Content: normal,  No delusions or paranoia; did not appear to be responding to internal stimuli during the session  Orientation: grossly intact  Memory: Grossly intact  Attention Span/Concentration: Attends to session without distraction; reports no difficulty  Fund of  "Knowledge: average  Estimate of Intelligence: average from verbal skills and history  Cognition: grossly intact  Insight: patient has awareness of illness; good insight into own behavior and behavior of others  Judgment: the patient's behavior is adequate to circumstances    Interval history and content of current session: Patient discussed events and activities since the time of last visit. Discussed current adaptation to 's disease and treatment status and 's adaptation to disease and treatment status. Reports to be coping with moderate difficulty. Evaluated cognitive response, paying particular attention to negative intrusive thoughts of a persistent and detrimental nature. Thoughts of this type are in evidence with moderate distress. Provided cognitive behavioral therapy to address negative cognitions.  (Core beliefs "I am not important" "I am not loved")    Identified and evaluated psychosocial and environmental stressors secondary to 's diagnosis and treatment (cognitive changes, ED).  Examined proactive behaviors that may be implemented to minimize or ameliorate psychosocial stressors. Discussed patient's interactions with 's medical team and how she might engage differently in visits to promote his feeling of autonomy. ("Balance" visit by allowing him to control and only jumping in at end to clarify)    Prior: discussion of sexual changes: Enjoyable and fulfilling sex life prior to 's illness. Historical use of Viagra, Cialis. 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 Since Padcev, his ability to have an erection (or ejaculate without an erection) is almost gone. She feels his interest is low (although they still cuddle and show other physical affection). She wonders about his thinking about their change in interaction and whether he is interested in other potential assistive devices for " intercourse. (Her drive has not changed, is comfortable masturbating, unsure how he thinks about her sexual interest).     Risk parameters:   Patient reports no suicidal ideation  Patient reports no homicidal ideation  Patient reports no self-injurious behavior  Patient reports no violent behavior   Safety needs:  None at this time      Verbal deficits: None     Patient's response to intervention:The patient's response to intervention is accepting, motivated.     Progress toward goals: Progress as Expected        Patient reported outcomes:      Distress thermometer:   DISTRESS SCREENING 10/11/2022 9/28/2022 9/15/2022 8/15/2022   Distress Score 2 4 4 8   Practical Problems Insurance/Financial;Treatment Decisions - - Insurance/Financial;Work/School   Family Problems Dealing with Children;Dealing with Partner - - Dealing with Partner;Family Health Issues   Emotional Problems Fears;Sadness;Worry - - Fears;Sadness;Worry   Spiritual / [employer] Concerns No - - No   Physical Problems Eating - - Memory/Concentration;Sexual   Other Problems - - - Insecurity, negativity           PHQ-9=2; Initial visit: 7  PHQ8 Score : (P) 2 (10/11/22 1115)  PHQ-9 Total Score: (P) 2 (10/11/22 1115)      DEANNA-7=4 Initial visit:6   GAD7 10/11/2022 8/15/2022   1. Feeling nervous, anxious, or on edge? 1 2   2. Not being able to stop or control worrying? 1 1   3. Worrying too much about different things? 1 1   4. Trouble relaxing? 0 1   5. Being so restless that it is hard to sit still? 0 1   6. Becoming easily annoyed or irritable? 1 2   7. Feeling afraid as if something awful might happen? 1 1   DEANNA-7 Score 5 9          Client Strengths: verbal, intelligent, successful, good social support, good insight, commitment to wellness      Treatment Plan:individual psychotherapy  Target symptoms: adjustment  Why chosen therapy is appropriate versus another modality: relevant to diagnosis, patient responds to this modality, evidence based  practice  Outcome monitoring methods: self-report, checklist/rating scale  Therapeutic intervention type: behavior modifying psychotherapy  Prognosis: Good    Goals from last visit: Met   Behavioral goals prior to next visit:    Exercise:   Stress management:   Social engagement:   Nutrition:   Smoking Cessation:   Therapy: journaling- about perfectionism/ toxic positivity- I can love myself- how to show it?    Balanced approach to 's medical visits (let him lead)    Back to [facility]    Return to clinic: 2 weeks     Length of Service (minutes direct face-to-face contact): 45    Diagnosis:     ICD-10-CM ICD-9-CM   1. Adjustment disorder with anxious mood  F43.22 309.24       Xavier Núñez, PhD  [place] License #[id number]  [place] License #[id number]

## 2022-10-20 ENCOUNTER — OFFICE VISIT (OUTPATIENT)
Dept: PODIATRY | Facility: CLINIC | Age: 67
End: 2022-10-20
Payer: MEDICARE

## 2022-10-20 VITALS
SYSTOLIC BLOOD PRESSURE: 123 MMHG | DIASTOLIC BLOOD PRESSURE: 69 MMHG | HEART RATE: 60 BPM | WEIGHT: 196.19 LBS | HEIGHT: 66 IN | BODY MASS INDEX: 31.53 KG/M2

## 2022-10-20 DIAGNOSIS — M20.41 HAMMER TOES OF BOTH FEET: ICD-10-CM

## 2022-10-20 DIAGNOSIS — M20.42 HAMMER TOES OF BOTH FEET: ICD-10-CM

## 2022-10-20 DIAGNOSIS — L60.9 DISEASE OF NAIL: Primary | ICD-10-CM

## 2022-10-20 DIAGNOSIS — M21.372 LEFT FOOT DROP: ICD-10-CM

## 2022-10-20 DIAGNOSIS — G60.9 IDIOPATHIC PERIPHERAL NEUROPATHY: ICD-10-CM

## 2022-10-20 PROCEDURE — 99999 PR PBB SHADOW E&M-EST. PATIENT-LVL III: ICD-10-PCS | Mod: PBBFAC,,, | Performed by: PODIATRIST

## 2022-10-20 PROCEDURE — 99999 PR PBB SHADOW E&M-EST. PATIENT-LVL III: CPT | Mod: PBBFAC,,, | Performed by: PODIATRIST

## 2022-10-20 PROCEDURE — 99203 PR OFFICE/OUTPT VISIT, NEW, LEVL III, 30-44 MIN: ICD-10-PCS | Mod: S$PBB,,, | Performed by: PODIATRIST

## 2022-10-20 PROCEDURE — 99203 OFFICE O/P NEW LOW 30 MIN: CPT | Mod: S$PBB,,, | Performed by: PODIATRIST

## 2022-10-20 PROCEDURE — 99213 OFFICE O/P EST LOW 20 MIN: CPT | Mod: PBBFAC,PN | Performed by: PODIATRIST

## 2022-10-20 RX ORDER — AMMONIUM LACTATE 12 G/100G
1 CREAM TOPICAL DAILY
Qty: 140 G | Refills: 1 | Status: SHIPPED | OUTPATIENT
Start: 2022-10-20

## 2022-10-20 NOTE — PROGRESS NOTES
Subjective:      Patient ID: Verena Toscano is a 67 y.o. female.    Chief Complaint:   Nail Problem (Left foot great toe/right foot little toenail)    Verena is a 67 y.o. female who presents to the clinic complaining of thick and discolored toenails on both feet. Verena is inquiring about treatment options.     C/o nail pain. She does not think there is any infection but can't see them well.       Left foot drop since back surgery x 2. Hx fall. Had a boot for left foot for a sprained toe.     Does not affect Jazzercise    Got new balance and inserts which help    Toes poke through top of shoes.. since she was a kid. She has tried inserts with no help.       Past Medical History:   Diagnosis Date    GERD (gastroesophageal reflux disease)     Hypertension     Therapy      Past Surgical History:   Procedure Laterality Date    ADENOIDECTOMY      APPENDECTOMY      breast reduction       SECTION      HYSTERECTOMY      LAMINECTOMY      L4 and 5    TONSILLECTOMY       Current Outpatient Medications on File Prior to Visit   Medication Sig Dispense Refill    acetaminophen (TYLENOL) 500 MG tablet Take 1,000 mg by mouth every 6 (six) hours as needed for Pain.      estradiol (ESTRACE) 1 MG tablet       loratadine (CLARITIN) 10 mg tablet Take 10 mg by mouth once daily.      omeprazole (PRILOSEC) 10 MG capsule Take 10 mg by mouth once daily.      triamterene-hydrochlorothiazide 37.5-25 mg (DYAZIDE) 37.5-25 mg per capsule Take 1 capsule by mouth every morning.      vitamin D 1000 units Tab Take 1,000 Units by mouth once daily.      vitamin E 100 UNIT capsule Take 100 Units by mouth once daily.      (Magic mouthwash) 1:1:1 Benadryl 12.5mg/5ml liq, aluminum & magnesium hydroxide-simehticone (Maalox), lidocaine viscous 2% Swish and spit 10 mLs every 4 (four) hours as needed. for sore throat 360 mL 0    doxycycline (ORACEA) 40 mg capsule Take 40 mg by mouth once daily.      NEXIUM 40 mg capsule take 1 capsule by  "mouth every morning BEFORE BREAKFAST (Patient not taking: Reported on 10/3/2019) 30 capsule 3    ondansetron (ZOFRAN-ODT) 4 MG TbDL Take 1 tablet (4 mg total) by mouth every 6 (six) hours as needed. (Patient not taking: No sig reported) 12 tablet 0     No current facility-administered medications on file prior to visit.     Review of patient's allergies indicates:   Allergen Reactions    Bactrim [sulfamethoxazole-trimethoprim] Other (See Comments)     Severe headaches. crystaline baldder    Demerol [meperidine] Nausea And Vomiting    Codeine Nausea And Vomiting    Morphine Nausea And Vomiting and Rash       Review of Systems   Constitutional: Negative for chills, decreased appetite, fever, malaise/fatigue, night sweats, weight gain and weight loss.   Cardiovascular:  Negative for chest pain, claudication, dyspnea on exertion, leg swelling, palpitations and syncope.   Respiratory:  Negative for cough and shortness of breath.    Endocrine: Negative for cold intolerance and heat intolerance.   Hematologic/Lymphatic: Negative for bleeding problem. Does not bruise/bleed easily.   Skin:  Positive for nail changes. Negative for color change, dry skin, flushing, itching, poor wound healing, rash, skin cancer, suspicious lesions and unusual hair distribution.   Musculoskeletal:  Positive for back pain and falls. Negative for arthritis, gout, joint pain, joint swelling, muscle cramps, muscle weakness, myalgias, neck pain and stiffness.   Gastrointestinal:  Negative for diarrhea, nausea and vomiting.   Neurological:  Positive for focal weakness. Negative for dizziness, light-headedness, numbness, paresthesias, tremors, vertigo and weakness.   Psychiatric/Behavioral:  Negative for altered mental status and depression. The patient does not have insomnia.    Allergic/Immunologic: Negative.          Objective:       Vitals:    10/20/22 0915   BP: 123/69   Pulse: 60   Weight: 89 kg (196 lb 3.4 oz)   Height: 5' 6" (1.676 m) "   PainSc: 0-No pain   89 kg (196 lb 3.4 oz)     Physical Exam  Vitals reviewed.   Constitutional:       General: She is not in acute distress.     Appearance: She is well-developed. She is not ill-appearing, toxic-appearing or diaphoretic.      Comments: Proper supportive shoegear      Cardiovascular:      Pulses:           Dorsalis pedis pulses are 2+ on the right side and 2+ on the left side.        Posterior tibial pulses are 2+ on the right side and 2+ on the left side.   Musculoskeletal:         General: No tenderness or deformity.      Right lower leg: No edema.      Left lower leg: No edema.      Right ankle: Normal.      Right Achilles Tendon: Normal.      Left ankle: Normal.      Left Achilles Tendon: Normal.      Right foot: Decreased range of motion. No deformity, tenderness or bony tenderness.      Left foot: Decreased range of motion. No deformity, tenderness or bony tenderness.      Comments: No pop    Mild decrease in df strength 4/5 b/l    Resting dorsiflexed position 1st MTPJ   Feet:      Right foot:      Protective Sensation: 10 sites tested.  5 sites sensed.      Skin integrity: Dry skin present. No ulcer, blister, skin breakdown, erythema, warmth or callus.      Toenail Condition: Right toenails are abnormally thick.      Left foot:      Protective Sensation: 10 sites tested.  6 sites sensed.      Skin integrity: Dry skin present. No ulcer, blister, skin breakdown, erythema, warmth or callus.      Toenail Condition: Left toenails are abnormally thick.      Comments: No soi  Skin:     General: Skin is warm.      Capillary Refill: Capillary refill takes 2 to 3 seconds.      Coloration: Skin is not pale.      Findings: No erythema or rash.   Neurological:      Mental Status: She is alert and oriented to person, place, and time.      Sensory: No sensory deficit.      Gait: Gait is intact.   Psychiatric:         Attention and Perception: Attention normal.         Mood and Affect: Mood normal.          Speech: Speech normal.         Behavior: Behavior normal.         Thought Content: Thought content normal.         Cognition and Memory: Cognition normal.         Judgment: Judgment normal.             Assessment:       Encounter Diagnoses   Name Primary?    Disease of nail Yes    Hammer toes of both feet     Left foot drop     Idiopathic peripheral neuropathy          Plan:       Verena was seen today for nail problem.    Diagnoses and all orders for this visit:    Disease of nail    Hammer toes of both feet    Left foot drop    Idiopathic peripheral neuropathy    Other orders  -     ammonium lactate 12 % Crea; Apply 1 g topically Daily.    I counseled the patient on her conditions, their implications and medical management.        - Shoe inspection. Patient instructed on proper foot hygeine. We discussed wearing proper shoe gear, daily foot inspections, never walking without protective shoe gear, never putting sharp instruments to feet, routine podiatric nail visits every 2-3 months.      - With patient's permission, nails were aggressively reduced and debrided x 10 to their soft tissue attachment mechanically and  removing all offending nail and debris. Patient relates relief following the procedure. She will continue to monitor the areas daily, inspect her feet, wear protective shoe gear when ambulatory, moisturizer to maintain skin integrity and follow in this office in approximately 2-3 months, sooner p.r.n.    - conitnue proper shoes/inserts    - rx amm lac.     - consider p.therapy again          No follow-ups on file.

## 2022-10-26 ENCOUNTER — OFFICE VISIT (OUTPATIENT)
Dept: PSYCHIATRY | Facility: CLINIC | Age: 67
End: 2022-10-26
Payer: MEDICARE

## 2022-10-26 DIAGNOSIS — F43.22 ADJUSTMENT DISORDER WITH ANXIOUS MOOD: Primary | ICD-10-CM

## 2022-10-26 PROCEDURE — 90834 PSYTX W PT 45 MINUTES: CPT | Mod: ,,, | Performed by: PSYCHOLOGIST

## 2022-10-26 PROCEDURE — 90834 PR PSYCHOTHERAPY W/PATIENT, 45 MIN: ICD-10-PCS | Mod: ,,, | Performed by: PSYCHOLOGIST

## 2022-10-26 NOTE — PROGRESS NOTES
PSYCHO-ONCOLOGY NOTE/ Individual Psychotherapy     Date: 10/26/2022   Site:  [facility]        Therapeutic Intervention: Met with patient.  Outpatient - Behavior modifying psychotherapy 45 min - CPT code 51985  The patient's last visit with me was on 10/12/2022.    Problem list  Patient Active Problem List   Diagnosis    Cough    Cognitive complaints    Adjustment disorder with anxious mood       Chief complaint/reason for encounter: anxiety   Met with patient to evaluate psychosocial adaptation to caregiving    Current Medications  Current Outpatient Medications   Medication    (Magic mouthwash) 1:1:1 Benadryl 12.5mg/5ml liq, aluminum & magnesium hydroxide-simehticone (Maalox), lidocaine viscous 2%    acetaminophen (TYLENOL) 500 MG tablet    ammonium lactate 12 % Crea    doxycycline (ORACEA) 40 mg capsule    estradiol (ESTRACE) 1 MG tablet    loratadine (CLARITIN) 10 mg tablet    NEXIUM 40 mg capsule    omeprazole (PRILOSEC) 10 MG capsule    ondansetron (ZOFRAN-ODT) 4 MG TbDL    triamterene-hydrochlorothiazide 37.5-25 mg (DYAZIDE) 37.5-25 mg per capsule    vitamin D 1000 units Tab    vitamin E 100 UNIT capsule     No current facility-administered medications for this visit.       Objective:  Verena Toscano arrived promptly for the session.  Ms. Toscano was independently ambulatory at the time of session. The patient was fully cooperative throughout the session.  Appearance: age appropriate, casually  dressed, well groomed  Behavior/Cooperation: friendly and cooperative  Speech: normal in rate, volume, and tone and appropriate quality, quantity and organization of sentences  Mood: anxious  Affect: dysphoric, tearful  Thought Process: goal-directed, logical  Thought Content: normal,  No delusions or paranoia; did not appear to be responding to internal stimuli during the session  Orientation: grossly intact  Memory: Grossly intact  Attention Span/Concentration: Attends to session without distraction;  reports no difficulty  Fund of Knowledge: average  Estimate of Intelligence: average from verbal skills and history  Cognition: grossly intact  Insight: patient has awareness of illness; good insight into own behavior and behavior of others  Judgment: the patient's behavior is adequate to circumstances    Interval history and content of current session: Patient discussed events and activities since the time of last visit. Discussed current adaptation to 's disease and treatment status and 's adaptation to disease and treatment status. Reports to be coping with improvement. Patient reports significant benefit from the exercise to treat herself with love last visit. Evaluated cognitive response, paying particular attention to negative intrusive thoughts of a persistent and detrimental nature. Thoughts of this type are in evidence with moderate distress. Provided cognitive behavioral therapy to address negative cognitions.      Identified and evaluated psychosocial and environmental stressors secondary to 's diagnosis and treatment (cognitive changes, ED).  Examined proactive behaviors that may be implemented to minimize or ameliorate psychosocial stressors. Discussed patient's attempts to improve interactions with 's medical team and engage differently in visits to promote his feeling of autonomy. She is also endeavoring to increase his sense of agency in day to day life.     Patient also discussed her historical tendency to devalue her own experiences, needs, and wants and to use sexual behavior/attention/attractiveness to earn positive valuation from others. She had to work diligently to change her behavior and change her expectations of romantic partners. A recent interaction at a party reminded her of these struggles.    Prior: discussion of sexual changes: Enjoyable and fulfilling sex life prior to 's illness. Historical use of Viagra, Cialis. Erectile dysfunction and penile  neuropathy increasing in recent years (with improvement while  was on immunotherapy). Since Padcev, his ability to have an erection (or ejaculate without an erection) is almost gone. She feels his interest is low (although they still cuddle and show other physical affection). She wonders about his thinking about their change in interaction and whether he is interested in other potential assistive devices for intercourse. (Her drive has not changed, is comfortable masturbating, unsure how he thinks about her sexual interest).     Risk parameters:   Patient reports no suicidal ideation  Patient reports no homicidal ideation  Patient reports no self-injurious behavior  Patient reports no violent behavior   Safety needs:  None at this time      Verbal deficits: None     Patient's response to intervention:The patient's response to intervention is accepting, motivated.     Progress toward goals: Progress as Expected        Patient reported outcomes:      Distress thermometer:   DISTRESS SCREENING 10/26/2022 10/11/2022 9/28/2022 9/15/2022 8/15/2022   Distress Score 3 2 4 4 8   Practical Problems - Insurance/Financial;Treatment Decisions - - Insurance/Financial;Work/School   Family Problems - Dealing with Children;Dealing with Partner - - Dealing with Partner;Family Health Issues   Emotional Problems - Fears;Sadness;Worry - - Fears;Sadness;Worry   Spiritual / [employer] Concerns - No - - No   Physical Problems - Eating - - Memory/Concentration;Sexual   Other Problems - - - - Insecurity, negativity           PHQ-9=3  ; Initial visit: 7            DEANNA-7=1; Initial visit:6   GAD7 10/11/2022 8/15/2022   1. Feeling nervous, anxious, or on edge? 1 2   2. Not being able to stop or control worrying? 1 1   3. Worrying too much about different things? 1 1   4. Trouble relaxing? 0 1   5. Being so restless that it is hard to sit still? 0 1   6. Becoming easily annoyed or irritable? 1 2   7. Feeling afraid as if something awful  might happen? 1 1   DEANNA-7 Score 5 9          Client Strengths: verbal, intelligent, successful, good social support, good insight, commitment to wellness      Treatment Plan:individual psychotherapy  Target symptoms: adjustment  Why chosen therapy is appropriate versus another modality: relevant to diagnosis, patient responds to this modality, evidence based practice  Outcome monitoring methods: self-report, checklist/rating scale  Therapeutic intervention type: behavior modifying psychotherapy  Prognosis: Good    Goals from last visit: Met   Behavioral goals prior to next visit:    Exercise:   Stress management:   Social engagement: focus on value added activities with , not just total time   Nutrition:   Smoking Cessation:   Therapy: journaling    Balanced approach to 's medical visits (let him lead)    Back to [facility]    Return to clinic: 2 weeks     Length of Service (minutes direct face-to-face contact): 45    Diagnosis:     ICD-10-CM ICD-9-CM   1. Adjustment disorder with anxious mood  F43.22 309.24         Xavier Núñez, PhD  [place] License #[id number]  [place] License #[id number]

## 2022-10-27 ENCOUNTER — TELEPHONE (OUTPATIENT)
Dept: PSYCHIATRY | Facility: CLINIC | Age: 67
End: 2022-10-27
Payer: MEDICARE

## 2022-10-27 NOTE — TELEPHONE ENCOUNTER
Spoke with pt and confirmed f/u appts with .      ----- Message from Xavier Núñez, PhD sent at 10/26/2022  8:55 PM CDT -----  [facility]-  Please contact Dr. Toscano for an additional follow-up visit 2-3 weeks after current one already scheduled in mid-November.  Thank you  ML

## 2022-11-14 ENCOUNTER — OFFICE VISIT (OUTPATIENT)
Dept: PSYCHIATRY | Facility: CLINIC | Age: 67
End: 2022-11-14
Payer: MEDICARE

## 2022-11-14 DIAGNOSIS — F43.22 ADJUSTMENT DISORDER WITH ANXIOUS MOOD: Primary | ICD-10-CM

## 2022-11-14 PROCEDURE — 99999 PR PBB SHADOW E&M-EST. PATIENT-LVL II: ICD-10-PCS | Mod: PBBFAC,,, | Performed by: PSYCHOLOGIST

## 2022-11-14 PROCEDURE — 90834 PR PSYCHOTHERAPY W/PATIENT, 45 MIN: ICD-10-PCS | Mod: ,,, | Performed by: PSYCHOLOGIST

## 2022-11-14 PROCEDURE — 99212 OFFICE O/P EST SF 10 MIN: CPT | Mod: PBBFAC | Performed by: PSYCHOLOGIST

## 2022-11-14 PROCEDURE — 90834 PSYTX W PT 45 MINUTES: CPT | Mod: ,,, | Performed by: PSYCHOLOGIST

## 2022-11-14 PROCEDURE — 99999 PR PBB SHADOW E&M-EST. PATIENT-LVL II: CPT | Mod: PBBFAC,,, | Performed by: PSYCHOLOGIST

## 2022-11-19 NOTE — PROGRESS NOTES
PSYCHO-ONCOLOGY NOTE/ Individual Psychotherapy     Date: 11/14/2022   Site:  [address]        Therapeutic Intervention: Met with patient.  Outpatient - Behavior modifying psychotherapy 45 min - CPT code 94413  The patient's last visit with me was on 10/26/2022.      Problem list  Patient Active Problem List   Diagnosis    Cough    Cognitive complaints    Adjustment disorder with anxious mood       Chief complaint/reason for encounter: anxiety   Met with patient to evaluate psychosocial adaptation to caregiving    Current Medications  Current Outpatient Medications   Medication    (Magic mouthwash) 1:1:1 Benadryl 12.5mg/5ml liq, aluminum & magnesium hydroxide-simehticone (Maalox), lidocaine viscous 2%    acetaminophen (TYLENOL) 500 MG tablet    ammonium lactate 12 % Crea    doxycycline (ORACEA) 40 mg capsule    estradiol (ESTRACE) 1 MG tablet    loratadine (CLARITIN) 10 mg tablet    NEXIUM 40 mg capsule    omeprazole (PRILOSEC) 10 MG capsule    ondansetron (ZOFRAN-ODT) 4 MG TbDL    triamterene-hydrochlorothiazide 37.5-25 mg (DYAZIDE) 37.5-25 mg per capsule    vitamin D 1000 units Tab    vitamin E 100 UNIT capsule     No current facility-administered medications for this visit.       Objective:  Verena Toscano arrived promptly for the session.  Ms. Toscano was independently ambulatory at the time of session. The patient was fully cooperative throughout the session.  Appearance: age appropriate, casually  dressed, well groomed  Behavior/Cooperation: friendly and cooperative  Speech: normal in rate, volume, and tone and appropriate quality, quantity and organization of sentences  Mood: anxious  Affect: dysphoric  Thought Process: goal-directed, logical  Thought Content: normal,  No delusions or paranoia; did not appear to be responding to internal stimuli during the session  Orientation: grossly intact  Memory: Grossly intact  Attention Span/Concentration: Attends to session without distraction; reports no  "difficulty  Fund of Knowledge: average  Estimate of Intelligence: average from verbal skills and history  Cognition: grossly intact  Insight: patient has awareness of illness; good insight into own behavior and behavior of others  Judgment: the patient's behavior is adequate to circumstances    Interval history and content of current session: Patient discussed events and activities since the time of last visit. Discussed current adaptation to 's disease and treatment status and 's adaptation to disease and treatment status. Reports to be coping with improvement. Patient reports some anxiety when attempting to be assertive with her  during the past weeks (e.g., picking dinner restaurant). DIscussed setting goals for self when interacting with daughter over  Thanksgiving. Evaluated cognitive response, paying particular attention to negative intrusive thoughts of a persistent and detrimental nature. Thoughts of this type are in evidence with moderate distress. Provided cognitive behavioral therapy to address negative cognitions.  (Orthopedist said scoliosis- feels "damaged" "physically inadequate" "scared")      Prior: discussion of sexual changes: Enjoyable and fulfilling sex life prior to 's illness. Historical use of Viagra Cialis. 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 Since Padcev, his ability to have an erection (or ejaculate without an erection) is almost gone. She feels his interest is low (although they still cuddle and show other physical affection). She wonders about his thinking about their change in interaction and whether he is interested in other potential assistive devices for intercourse. (Her drive has not changed, is comfortable masturbating, unsure how he thinks about her sexual interest).     Risk parameters:   Patient reports no suicidal ideation  Patient reports no homicidal " ideation  Patient reports no self-injurious behavior  Patient reports no violent behavior   Safety needs:  None at this time      Verbal deficits: None     Patient's response to intervention:The patient's response to intervention is accepting, motivated.     Progress toward goals: Progress as Expected        Patient reported outcomes:      Distress thermometer:   DISTRESS SCREENING 11/14/2022 10/26/2022 10/11/2022 9/28/2022 9/15/2022 8/15/2022   Distress Score 2 3 2 4 4 8   Practical Problems Insurance/Financial - Insurance/Financial;Treatment Decisions - - Insurance/Financial;Work/School   Family Problems Dealing with Children;Dealing with Partner - Dealing with Children;Dealing with Partner - - Dealing with Partner;Family Health Issues   Emotional Problems Worry - Fears;Sadness;Worry - - Fears;Sadness;Worry   Spiritual / Uatsdin Concerns No - No - - No   Physical Problems Fatigue;Pain - Eating - - Memory/Concentration;Sexual   Other Problems - - - - - Insecurity, negativity           PHQ-9=3PHQ-9 Total Score: 3 (11/14/22 1048) ; Initial visit: 7  PHQ8 Score : 3 (11/14/22 1048)  PHQ-9 Total Score: 3 (11/14/22 1048)      DEANNA-7=1; Initial visit:6   GAD7 11/14/2022 10/11/2022 8/15/2022   1. Feeling nervous, anxious, or on edge? 1 1 2   2. Not being able to stop or control worrying? 1 1 1   3. Worrying too much about different things? 0 1 1   4. Trouble relaxing? 0 0 1   5. Being so restless that it is hard to sit still? 0 0 1   6. Becoming easily annoyed or irritable? 1 1 2   7. Feeling afraid as if something awful might happen? 0 1 1   DEANNA-7 Score 3 5 9          Client Strengths: verbal, intelligent, successful, good social support, good insight, commitment to wellness      Treatment Plan:individual psychotherapy  Target symptoms: adjustment  Why chosen therapy is appropriate versus another modality: relevant to diagnosis, patient responds to this modality, evidence based practice  Outcome monitoring methods:  "self-report, checklist/rating scale  Therapeutic intervention type: behavior modifying psychotherapy  Prognosis: Good    Goals from last visit: Met   Behavioral goals prior to next visit:    Exercise:   Stress management: change expectations with daughter   Social engagement: focus on value added activities with , not just total time   Nutrition:   Smoking Cessation:   Therapy: journaling    Sef-talk - "I will survive"    Return to clinic: 2 weeks     Length of Service (minutes direct face-to-face contact): 45    Diagnosis:     ICD-10-CM ICD-9-CM   1. Adjustment disorder with anxious mood  F43.22 309.24         Xavier Núñez, PhD  [place] License #[id number]  [place] License #[id number]              "

## 2022-12-07 ENCOUNTER — OFFICE VISIT (OUTPATIENT)
Dept: PSYCHIATRY | Facility: CLINIC | Age: 67
End: 2022-12-07
Payer: MEDICARE

## 2022-12-07 DIAGNOSIS — F43.22 ADJUSTMENT DISORDER WITH ANXIOUS MOOD: Primary | ICD-10-CM

## 2022-12-07 PROCEDURE — 90834 PR PSYCHOTHERAPY W/PATIENT, 45 MIN: ICD-10-PCS | Mod: ,,, | Performed by: PSYCHOLOGIST

## 2022-12-07 PROCEDURE — 99999 PR PBB SHADOW E&M-EST. PATIENT-LVL I: CPT | Mod: PBBFAC,,, | Performed by: PSYCHOLOGIST

## 2022-12-07 PROCEDURE — 99999 PR PBB SHADOW E&M-EST. PATIENT-LVL I: ICD-10-PCS | Mod: PBBFAC,,, | Performed by: PSYCHOLOGIST

## 2022-12-07 PROCEDURE — 99211 OFF/OP EST MAY X REQ PHY/QHP: CPT | Mod: PBBFAC | Performed by: PSYCHOLOGIST

## 2022-12-07 PROCEDURE — 90834 PSYTX W PT 45 MINUTES: CPT | Mod: ,,, | Performed by: PSYCHOLOGIST

## 2022-12-07 NOTE — PROGRESS NOTES
PSYCHO-ONCOLOGY NOTE/ Individual Psychotherapy     Date: 12/7/2022   Site:  [facility] [address]        Therapeutic Intervention: Met with patient.  Outpatient - Behavior modifying psychotherapy 45 min - CPT code 92652  The patient's last visit with me was on 10/26/2022.      Problem list  Patient Active Problem List   Diagnosis    Cough    Cognitive complaints    Adjustment disorder with anxious mood       Chief complaint/reason for encounter: anxiety   Met with patient to evaluate psychosocial adaptation to caregiving    Current Medications  Current Outpatient Medications   Medication    (Magic mouthwash) 1:1:1 Benadryl 12.5mg/5ml liq, aluminum & magnesium hydroxide-simehticone (Maalox), lidocaine viscous 2%    acetaminophen (TYLENOL) 500 MG tablet    ammonium lactate 12 % Crea    doxycycline (ORACEA) 40 mg capsule    estradiol (ESTRACE) 1 MG tablet    loratadine (CLARITIN) 10 mg tablet    NEXIUM 40 mg capsule    omeprazole (PRILOSEC) 10 MG capsule    ondansetron (ZOFRAN-ODT) 4 MG TbDL    triamterene-hydrochlorothiazide 37.5-25 mg (DYAZIDE) 37.5-25 mg per capsule    vitamin D 1000 units Tab    vitamin E 100 UNIT capsule     No current facility-administered medications for this visit.       Objective:  Verena Toscano arrived promptly for the session.  Ms. Toscano was independently ambulatory at the time of session. The patient was fully cooperative throughout the session.  Appearance: age appropriate, casually  dressed, well groomed  Behavior/Cooperation: friendly and cooperative  Speech: normal in rate, volume, and tone and appropriate quality, quantity and organization of sentences  Mood: anxious  Affect: dysphoric  Thought Process: goal-directed, logical  Thought Content: normal,  No delusions or paranoia; did not appear to be responding to internal stimuli during the session  Orientation: grossly intact  Memory: Grossly intact  Attention Span/Concentration: Attends to session without distraction; reports no  "difficulty  Fund of Knowledge: average  Estimate of Intelligence: average from verbal skills and history  Cognition: grossly intact  Insight: patient has awareness of illness; good insight into own behavior and behavior of others  Judgment: the patient's behavior is adequate to circumstances    Interval history and content of current session: Patient discussed events and activities since the time of last visit. Discussed current adaptation to 's disease and treatment status and 's adaptation to disease and treatment status. Reports to be coping with improvement. Patient reports some anxiety when attempting to be assertive with her  or children (trung around finances). Evaluated cognitive response, paying particular attention to negative intrusive thoughts of a persistent and detrimental nature. Thoughts of this type are in evidence with moderate distress. Provided cognitive behavioral therapy to address negative cognitions.  ("Have to avoid disappointing others")    Discussed "freeze" response when perceived threat and patient's historical associations (father's "explosions" and name calling)    Prior: discussion of sexual changes: Enjoyable and fulfilling sex life prior to 's illness. Historical use of Viagra, Cialis. 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 Since Padcev, his ability to have an erection (or ejaculate without an erection) is almost gone. She feels his interest is low (although they still cuddle and show other physical affection). She wonders about his thinking about their change in interaction and whether he is interested in other potential assistive devices for intercourse. (Her drive has not changed, is comfortable masturbating, unsure how he thinks about her sexual interest).     Risk parameters:   Patient reports no suicidal ideation  Patient reports no homicidal ideation  Patient " reports no self-injurious behavior  Patient reports no violent behavior   Safety needs:  None at this time      Verbal deficits: None     Patient's response to intervention:The patient's response to intervention is accepting, motivated.     Progress toward goals: Progress as Expected        Patient reported outcomes:      Distress thermometer:   DISTRESS SCREENING 12/7/2022 11/14/2022 10/26/2022 10/11/2022 9/28/2022 9/15/2022 8/15/2022   Distress Score 5 2 3 2 4 4 8   Practical Problems - Insurance/Financial - Insurance/Financial;Treatment Decisions - - Insurance/Financial;Work/School   Family Problems - Dealing with Children;Dealing with Partner - Dealing with Children;Dealing with Partner - - Dealing with Partner;Family Health Issues   Emotional Problems - Worry - Fears;Sadness;Worry - - Fears;Sadness;Worry   Spiritual / Mandaeism Concerns - No - No - - No   Physical Problems - Fatigue;Pain - Eating - - Memory/Concentration;Sexual   Other Problems - - - - - - Insecurity, negativity           PHQ-9=5  ; Initial visit: 7            DEANNA-7=3; Initial visit:6   GAD7 11/14/2022 10/11/2022 8/15/2022   1. Feeling nervous, anxious, or on edge? 1 1 2   2. Not being able to stop or control worrying? 1 1 1   3. Worrying too much about different things? 0 1 1   4. Trouble relaxing? 0 0 1   5. Being so restless that it is hard to sit still? 0 0 1   6. Becoming easily annoyed or irritable? 1 1 2   7. Feeling afraid as if something awful might happen? 0 1 1   DEANNA-7 Score 3 5 9          Client Strengths: verbal, intelligent, successful, good social support, good insight, commitment to wellness      Treatment Plan:individual psychotherapy  Target symptoms: adjustment  Why chosen therapy is appropriate versus another modality: relevant to diagnosis, patient responds to this modality, evidence based practice  Outcome monitoring methods: self-report, checklist/rating scale  Therapeutic intervention type: behavior modifying  psychotherapy  Prognosis: Good    Goals from last visit: Met   Behavioral goals prior to next visit:    Exercise:   Stress management: assertive convos with kids about money   Social engagement: ask for compromise x 1 with    Nutrition:   Smoking Cessation:   Therapy: journaling        Return to clinic: 2 weeks     Length of Service (minutes direct face-to-face contact): 45    Diagnosis:     ICD-10-CM ICD-9-CM   1. Adjustment disorder with anxious mood  F43.22 309.24         Xavier Núñez, PhD  [place] License #[id number]  [place] License #[id number]

## 2022-12-21 ENCOUNTER — OFFICE VISIT (OUTPATIENT)
Dept: PSYCHIATRY | Facility: CLINIC | Age: 67
End: 2022-12-21
Payer: MEDICARE

## 2022-12-21 DIAGNOSIS — F43.22 ADJUSTMENT DISORDER WITH ANXIOUS MOOD: Primary | ICD-10-CM

## 2022-12-21 PROCEDURE — 90834 PR PSYCHOTHERAPY W/PATIENT, 45 MIN: ICD-10-PCS | Mod: ,,, | Performed by: PSYCHOLOGIST

## 2022-12-21 PROCEDURE — 99212 OFFICE O/P EST SF 10 MIN: CPT | Mod: PBBFAC | Performed by: PSYCHOLOGIST

## 2022-12-21 PROCEDURE — 99999 PR PBB SHADOW E&M-EST. PATIENT-LVL II: ICD-10-PCS | Mod: PBBFAC,,, | Performed by: PSYCHOLOGIST

## 2022-12-21 PROCEDURE — 90834 PSYTX W PT 45 MINUTES: CPT | Mod: ,,, | Performed by: PSYCHOLOGIST

## 2022-12-21 PROCEDURE — 99999 PR PBB SHADOW E&M-EST. PATIENT-LVL II: CPT | Mod: PBBFAC,,, | Performed by: PSYCHOLOGIST

## 2022-12-21 NOTE — PROGRESS NOTES
PSYCHO-ONCOLOGY NOTE/ Individual Psychotherapy     Date: 12/21/2022   Site:  [facility] [address]        Therapeutic Intervention: Met with patient.  Outpatient - Behavior modifying psychotherapy 45 min - CPT code 67595  The patient's last visit with me was on 12/7/2022.    Problem list  Patient Active Problem List   Diagnosis    Cough    Cognitive complaints    Adjustment disorder with anxious mood       Chief complaint/reason for encounter: anxiety   Met with patient to evaluate psychosocial adaptation to caregiving    Current Medications  Current Outpatient Medications   Medication    (Magic mouthwash) 1:1:1 Benadryl 12.5mg/5ml liq, aluminum & magnesium hydroxide-simehticone (Maalox), lidocaine viscous 2%    acetaminophen (TYLENOL) 500 MG tablet    ammonium lactate 12 % Crea    doxycycline (ORACEA) 40 mg capsule    estradiol (ESTRACE) 1 MG tablet    loratadine (CLARITIN) 10 mg tablet    NEXIUM 40 mg capsule    omeprazole (PRILOSEC) 10 MG capsule    ondansetron (ZOFRAN-ODT) 4 MG TbDL    triamterene-hydrochlorothiazide 37.5-25 mg (DYAZIDE) 37.5-25 mg per capsule    vitamin D 1000 units Tab    vitamin E 100 UNIT capsule     No current facility-administered medications for this visit.       Objective:  Verena Toscano arrived promptly for the session.  Ms. Toscano was independently ambulatory at the time of session. The patient was fully cooperative throughout the session.  Appearance: age appropriate, casually  dressed, well groomed  Behavior/Cooperation: friendly and cooperative  Speech: normal in rate, volume, and tone and appropriate quality, quantity and organization of sentences  Mood: anxious  Affect: dysphoric  Thought Process: goal-directed, logical  Thought Content: normal,  No delusions or paranoia; did not appear to be responding to internal stimuli during the session  Orientation: grossly intact  Memory: Grossly intact  Attention Span/Concentration: Attends to session without distraction; reports no  "difficulty  Fund of Knowledge: average  Estimate of Intelligence: average from verbal skills and history  Cognition: grossly intact  Insight: patient has awareness of illness; good insight into own behavior and behavior of others  Judgment: the patient's behavior is adequate to circumstances    Interval history and content of current session: Patient discussed events and activities since the time of last visit. Discussed current adaptation to 's disease and treatment status and 's adaptation to disease and treatment status. Reports to be coping with improvement. Patient reports continued anxiety when attempting to be assertive with her  or children (trung around finances). Evaluated cognitive response, paying particular attention to negative intrusive thoughts of a persistent and detrimental nature. Thoughts of this type are in evidence with moderate distress. Provided cognitive behavioral therapy to address negative cognitions.  ("disobeying" others' wishes)    Discussed financial strains (Medicare billing issue with PT)    Sad about making adaptations to activities due to pain. Value of activity pacing discussed (don't have to miss the activity, just delay).  Coping with changes to [place] traditions/expectations explored.      Prior: discussion of sexual changes: Enjoyable and fulfilling sex life prior to 's illness. Historical use of Viagra, Cialis. 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 Since Padcev, his ability to have an erection (or ejaculate without an erection) is almost gone. She feels his interest is low (although they still cuddle and show other physical affection). She wonders about his thinking about their change in interaction and whether he is interested in other potential assistive devices for intercourse. (Her drive has not changed, is comfortable masturbating, unsure how he thinks " about her sexual interest).     Risk parameters:   Patient reports no suicidal ideation  Patient reports no homicidal ideation  Patient reports no self-injurious behavior  Patient reports no violent behavior   Safety needs:  None at this time      Verbal deficits: None     Patient's response to intervention:The patient's response to intervention is accepting, motivated.     Progress toward goals: Progress as Expected        Patient reported outcomes:      Distress thermometer:   DISTRESS SCREENING 12/21/2022 12/7/2022 11/14/2022 10/26/2022 10/11/2022 9/28/2022 9/15/2022   Distress Score 3 5 2 3 2 4 4   Practical Problems Insurance/Financial - Insurance/Financial - Insurance/Financial;Treatment Decisions - -   Family Problems Dealing with Children;Dealing with Partner - Dealing with Children;Dealing with Partner - Dealing with Children;Dealing with Partner - -   Emotional Problems Fears;Worry - Worry - Fears;Sadness;Worry - -   Spiritual / Faith Concerns No - No - No - -   Physical Problems Pain - Fatigue;Pain - Eating - -   Other Problems - - - - - - -           PHQ-9=5PHQ-9 Total Score: 3 (12/21/22 0940) ; Initial visit: 7  PHQ8 Score : 3 (12/21/22 0940)  PHQ-9 Total Score: 3 (12/21/22 0940)      DEANNA-7=3; Initial visit:6   GAD7 12/21/2022 11/14/2022 10/11/2022   1. Feeling nervous, anxious, or on edge? 1 1 1   2. Not being able to stop or control worrying? 0 1 1   3. Worrying too much about different things? 1 0 1   4. Trouble relaxing? 0 0 0   5. Being so restless that it is hard to sit still? 0 0 0   6. Becoming easily annoyed or irritable? 1 1 1   7. Feeling afraid as if something awful might happen? 1 0 1   DEANNA-7 Score 4 3 5          Client Strengths: verbal, intelligent, successful, good social support, good insight, commitment to wellness      Treatment Plan:individual psychotherapy  Target symptoms: adjustment  Why chosen therapy is appropriate versus another modality: relevant to diagnosis, patient  responds to this modality, evidence based practice  Outcome monitoring methods: self-report, checklist/rating scale  Therapeutic intervention type: behavior modifying psychotherapy  Prognosis: Good    Goals from last visit: Met   Behavioral goals prior to next visit:    Exercise:   Stress management: assertive convos with kids about money    Only do the things that add value over [place]   Social engagement: ask for compromise x 1 with     Connect with cousins   Nutrition:   Smoking Cessation:   Therapy: journaling        Return to clinic: 2 weeks     Length of Service (minutes direct face-to-face contact): 45    Diagnosis:     ICD-10-CM ICD-9-CM   1. Adjustment disorder with anxious mood  F43.22 309.24           Xavier Núñez, PhD  [place] License #[id number]  [place] License #[id number]

## 2022-12-28 ENCOUNTER — PATIENT MESSAGE (OUTPATIENT)
Dept: PSYCHIATRY | Facility: CLINIC | Age: 67
End: 2022-12-28
Payer: MEDICARE

## 2023-01-18 ENCOUNTER — OFFICE VISIT (OUTPATIENT)
Dept: PSYCHIATRY | Facility: CLINIC | Age: 68
End: 2023-01-18
Payer: MEDICARE

## 2023-01-18 ENCOUNTER — TELEPHONE (OUTPATIENT)
Dept: INFUSION THERAPY | Facility: HOSPITAL | Age: 68
End: 2023-01-18
Payer: MEDICARE

## 2023-01-18 DIAGNOSIS — F43.22 ADJUSTMENT DISORDER WITH ANXIOUS MOOD: Primary | ICD-10-CM

## 2023-01-18 PROCEDURE — 90834 PSYTX W PT 45 MINUTES: CPT | Mod: ,,, | Performed by: PSYCHOLOGIST

## 2023-01-18 PROCEDURE — 99212 OFFICE O/P EST SF 10 MIN: CPT | Mod: PBBFAC | Performed by: PSYCHOLOGIST

## 2023-01-18 PROCEDURE — 99999 PR PBB SHADOW E&M-EST. PATIENT-LVL II: CPT | Mod: PBBFAC,,, | Performed by: PSYCHOLOGIST

## 2023-01-18 PROCEDURE — 96127 BRIEF EMOTIONAL/BEHAV ASSMT: CPT | Mod: PBBFAC | Performed by: PSYCHOLOGIST

## 2023-01-18 PROCEDURE — 90834 PR PSYCHOTHERAPY W/PATIENT, 45 MIN: ICD-10-PCS | Mod: ,,, | Performed by: PSYCHOLOGIST

## 2023-01-18 PROCEDURE — 99999 PR PBB SHADOW E&M-EST. PATIENT-LVL II: ICD-10-PCS | Mod: PBBFAC,,, | Performed by: PSYCHOLOGIST

## 2023-01-18 NOTE — PROGRESS NOTES
PSYCHO-ONCOLOGY NOTE/ Individual Psychotherapy     Date: 1/18/2023   Site:  [facility]        Therapeutic Intervention: Met with patient.   Outpatient - Behavior modifying psychotherapy 45 min - CPT code 64316  Brief emotional/behavioral assessment measures-CPT 88481   The patient's last visit with me was on 12/7/2022.    Problem list  Patient Active Problem List   Diagnosis    Cough    Cognitive complaints    Adjustment disorder with anxious mood       Chief complaint/reason for encounter: anxiety   Met with patient to evaluate psychosocial adaptation to caregiving    Current Medications  Current Outpatient Medications   Medication    (Magic mouthwash) 1:1:1 Benadryl 12.5mg/5ml liq, aluminum & magnesium hydroxide-simehticone (Maalox), lidocaine viscous 2%    acetaminophen (TYLENOL) 500 MG tablet    ammonium lactate 12 % Crea    doxycycline (ORACEA) 40 mg capsule    estradiol (ESTRACE) 1 MG tablet    loratadine (CLARITIN) 10 mg tablet    NEXIUM 40 mg capsule    omeprazole (PRILOSEC) 10 MG capsule    ondansetron (ZOFRAN-ODT) 4 MG TbDL    triamterene-hydrochlorothiazide 37.5-25 mg (DYAZIDE) 37.5-25 mg per capsule    vitamin D 1000 units Tab    vitamin E 100 UNIT capsule     No current facility-administered medications for this visit.       Objective:  Verena Toscano arrived promptly for the session.  Ms. Toscano was independently ambulatory at the time of session. The patient was fully cooperative throughout the session.  Appearance: age appropriate, casually  dressed, well groomed  Behavior/Cooperation: friendly and cooperative  Speech: normal in rate, volume, and tone and appropriate quality, quantity and organization of sentences  Mood: anxious  Affect: euthymic  Thought Process: goal-directed, logical  Thought Content: normal,  No delusions or paranoia; did not appear to be responding to internal stimuli during the session  Orientation: grossly intact  Memory: Grossly intact  Attention  "Span/Concentration: Attends to session without distraction; reports no difficulty  Fund of Knowledge: average  Estimate of Intelligence: average from verbal skills and history  Cognition: grossly intact  Insight: patient has awareness of illness; good insight into own behavior and behavior of others  Judgment: the patient's behavior is adequate to circumstances    Interval history and content of current session: Patient discussed events and activities since the time of last visit. Discussed current adaptation to 's disease and treatment status and 's adaptation to disease and treatment status. Reports to be coping with improvement. Patient reports continued anxiety when attempting to be assertive with her  or children (trung around finances). Evaluated cognitive response, paying particular attention to negative intrusive thoughts of a persistent and detrimental nature. Thoughts of this type are in evidence with moderate distress. Provided cognitive behavioral therapy to address negative cognitions. Discussed feeling she does not "deserve" her trip to [place] in May and sadness over her Bday being overshadowed by her daughter's wedding planning/shower.    Discussed financial strains (Medicare billing issue with PT)      Prior: discussion of sexual changes: Enjoyable and fulfilling sex life prior to 's illness. Historical use of Viagra, Cialis. 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 Since Padcev, his ability to have an erection (or ejaculate without an erection) is almost gone. She feels his interest is low (although they still cuddle and show other physical affection). She wonders about his thinking about their change in interaction and whether he is interested in other potential assistive devices for intercourse. (Her drive has not changed, is comfortable masturbating, unsure how he thinks about her sexual " interest).     Risk parameters:   Patient reports no suicidal ideation  Patient reports no homicidal ideation  Patient reports no self-injurious behavior  Patient reports no violent behavior   Safety needs:  None at this time      Verbal deficits: None     Patient's response to intervention:The patient's response to intervention is accepting, motivated.     Progress toward goals: Progress as Expected        Patient reported outcomes:      Distress thermometer:   DISTRESS SCREENING 1/18/2023 12/21/2022 12/7/2022 11/14/2022 10/26/2022 10/11/2022 9/28/2022   Distress Score 3 3 5 2 3 2 4   Practical Problems Insurance/Financial Insurance/Financial - Insurance/Financial - Insurance/Financial;Treatment Decisions -   Family Problems Dealing with Partner Dealing with Children;Dealing with Partner - Dealing with Children;Dealing with Partner - Dealing with Children;Dealing with Partner -   Emotional Problems Worry Fears;Worry - Worry - Fears;Sadness;Worry -   Spiritual / Jehovah's witness Concerns No No - No - No -   Physical Problems Pain;Skin Dry/Itchy Pain - Fatigue;Pain - Eating -   Other Problems - - - - - - -           PHQ-9=5PHQ-9 Total Score: 3 (01/18/23 1254)-minimal ; Initial visit: 7     DEANNA-7=3; Initial visit:6   GAD7 1/18/2023 12/21/2022 11/14/2022   1. Feeling nervous, anxious, or on edge? 1 1 1   2. Not being able to stop or control worrying? 0 0 1   3. Worrying too much about different things? 1 1 0   4. Trouble relaxing? 0 0 0   5. Being so restless that it is hard to sit still? 0 0 0   6. Becoming easily annoyed or irritable? 1 1 1   7. Feeling afraid as if something awful might happen? 0 1 0   DEANNA-7 Score 3 4 3    minimal      Client Strengths: verbal, intelligent, successful, good social support, good insight, commitment to wellness      Treatment Plan:individual psychotherapy  Target symptoms: adjustment  Why chosen therapy is appropriate versus another modality: relevant to diagnosis, patient responds to this  modality, evidence based practice  Outcome monitoring methods: self-report, checklist/rating scale  Therapeutic intervention type: behavior modifying psychotherapy  Prognosis: Good    Goals from last visit: Met   Behavioral goals prior to next visit:    Exercise:   Stress management: assertive convos with kids about money    Write out work budget   Social engagement:  don't back down when other's displeased with minor requests   Nutrition:   Smoking Cessation:   Therapy: journaling        Return to clinic: 2 weeks     Length of Service (minutes direct face-to-face contact): 45    Diagnosis:     ICD-10-CM ICD-9-CM   1. Adjustment disorder with anxious mood  F43.22 309.24           Xavier Núñez, PhD  [place] License #[id number]  [place] License #[id number]

## 2023-02-15 ENCOUNTER — OFFICE VISIT (OUTPATIENT)
Dept: PSYCHIATRY | Facility: CLINIC | Age: 68
End: 2023-02-15
Payer: MEDICARE

## 2023-02-15 DIAGNOSIS — F43.22 ADJUSTMENT DISORDER WITH ANXIOUS MOOD: Primary | ICD-10-CM

## 2023-02-15 PROCEDURE — 99999 PR PBB SHADOW E&M-EST. PATIENT-LVL II: ICD-10-PCS | Mod: PBBFAC,,, | Performed by: PSYCHOLOGIST

## 2023-02-15 PROCEDURE — 90834 PSYTX W PT 45 MINUTES: CPT | Mod: ,,, | Performed by: PSYCHOLOGIST

## 2023-02-15 PROCEDURE — 99212 OFFICE O/P EST SF 10 MIN: CPT | Mod: PBBFAC | Performed by: PSYCHOLOGIST

## 2023-02-15 PROCEDURE — 90834 PR PSYCHOTHERAPY W/PATIENT, 45 MIN: ICD-10-PCS | Mod: ,,, | Performed by: PSYCHOLOGIST

## 2023-02-15 PROCEDURE — 99999 PR PBB SHADOW E&M-EST. PATIENT-LVL II: CPT | Mod: PBBFAC,,, | Performed by: PSYCHOLOGIST

## 2023-02-15 NOTE — PROGRESS NOTES
PSYCHO-ONCOLOGY NOTE/ Individual Psychotherapy     Date: 2/15/2023   Site:  [facility]        Therapeutic Intervention: Met with patient.  Outpatient - Behavior modifying psychotherapy 45 min - CPT code 56552  The patient's last visit with me was on 1/18/2023.    Problem list  Patient Active Problem List   Diagnosis    Cough    Cognitive complaints    Adjustment disorder with anxious mood       Chief complaint/reason for encounter: anxiety   Met with patient to evaluate psychosocial adaptation to caregiving    Current Medications  Current Outpatient Medications   Medication    (Magic mouthwash) 1:1:1 Benadryl 12.5mg/5ml liq, aluminum & magnesium hydroxide-simehticone (Maalox), lidocaine viscous 2%    acetaminophen (TYLENOL) 500 MG tablet    ammonium lactate 12 % Crea    doxycycline (ORACEA) 40 mg capsule    estradiol (ESTRACE) 1 MG tablet    loratadine (CLARITIN) 10 mg tablet    NEXIUM 40 mg capsule    omeprazole (PRILOSEC) 10 MG capsule    ondansetron (ZOFRAN-ODT) 4 MG TbDL    triamterene-hydrochlorothiazide 37.5-25 mg (DYAZIDE) 37.5-25 mg per capsule    vitamin D 1000 units Tab    vitamin E 100 UNIT capsule     No current facility-administered medications for this visit.       Objective:  Verena Toscano arrived promptly for the session.  Ms. Toscano was independently ambulatory at the time of session. The patient was fully cooperative throughout the session.  Appearance: age appropriate, casually  dressed, well groomed  Behavior/Cooperation: friendly and cooperative  Speech: normal in rate, volume, and tone and appropriate quality, quantity and organization of sentences  Mood: anxious  Affect: euthymic  Thought Process: goal-directed, logical  Thought Content: normal,  No delusions or paranoia; did not appear to be responding to internal stimuli during the session  Orientation: grossly intact  Memory: Grossly intact  Attention Span/Concentration: Attends to session without distraction; reports no  difficulty  Fund of Knowledge: average  Estimate of Intelligence: average from verbal skills and history  Cognition: grossly intact  Insight: patient has awareness of illness; good insight into own behavior and behavior of others  Judgment: the patient's behavior is adequate to circumstances    Interval history and content of current session: Patient discussed events and activities since the time of last visit. Discussed current adaptation to 's disease and treatment status and 's adaptation to disease and treatment status. Reports to be coping with improvement. Patient reports continued anxiety when attempting to be assertive with her children (trung around finances). Evaluated cognitive response, paying particular attention to negative intrusive thoughts of a persistent and detrimental nature. Thoughts of this type are in evidence with moderate distress. Provided cognitive behavioral therapy to address negative cognitions.  Setting up difficult conversations explored.     Discussed financial strains (Medicare billing issue with PT)    Daughter visiting this weekend for [place]. Wedding shower 3/11/23.      Prior: discussion of sexual changes: Enjoyable and fulfilling sex life prior to 's illness. Historical use of Viagra, Cialis. 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 Since Padcev, his ability to have an erection (or ejaculate without an erection) is almost gone. She feels his interest is low (although they still cuddle and show other physical affection). She wonders about his thinking about their change in interaction and whether he is interested in other potential assistive devices for intercourse. (Her drive has not changed, is comfortable masturbating, unsure how he thinks about her sexual interest).     Risk parameters:   Patient reports no suicidal ideation  Patient reports no homicidal  ideation  Patient reports no self-injurious behavior  Patient reports no violent behavior   Safety needs:  None at this time      Verbal deficits: None     Patient's response to intervention:The patient's response to intervention is accepting, motivated.     Progress toward goals: Progress as Expected        Patient reported outcomes:      Distress thermometer:   DISTRESS SCREENING 2/15/2023 1/18/2023 12/21/2022 12/7/2022 11/14/2022 10/26/2022 10/11/2022   Distress Score 1 3 3 5 2 3 2   Practical Problems Insurance/Financial Insurance/Financial Insurance/Financial - Insurance/Financial - Insurance/Financial;Treatment Decisions   Family Problems Dealing with Children;Family Health Issues Dealing with Partner Dealing with Children;Dealing with Partner - Dealing with Children;Dealing with Partner - Dealing with Children;Dealing with Partner   Emotional Problems Worry Worry Fears;Worry - Worry - Fears;Sadness;Worry   Spiritual / Yazidism Concerns No No No - No - No   Physical Problems Pain Pain;Skin Dry/Itchy Pain - Fatigue;Pain - Eating   Other Problems - - - - - - -           PHQ-9=5PHQ-9 Total Score: (P) 2 (02/15/23 1417) ; Initial visit: 7  PHQ8 Score : (P) 2 (02/15/23 1417)  PHQ-9 Total Score: (P) 2 (02/15/23 1417)      DEANNA-7=3; Initial visit:6   GAD7 2/15/2023 1/18/2023 12/21/2022   1. Feeling nervous, anxious, or on edge? 1 1 1   2. Not being able to stop or control worrying? 0 0 0   3. Worrying too much about different things? 1 1 1   4. Trouble relaxing? 0 0 0   5. Being so restless that it is hard to sit still? 0 0 0   6. Becoming easily annoyed or irritable? 0 1 1   7. Feeling afraid as if something awful might happen? 1 0 1   DEANNA-7 Score 3 3 4          Client Strengths: verbal, intelligent, successful, good social support, good insight, commitment to wellness      Treatment Plan:individual psychotherapy  Target symptoms: adjustment  Why chosen therapy is appropriate versus another modality: relevant to  diagnosis, patient responds to this modality, evidence based practice  Outcome monitoring methods: self-report, checklist/rating scale  Therapeutic intervention type: behavior modifying psychotherapy  Prognosis: Good    Goals from last visit: Met   Behavioral goals prior to next visit:    Exercise:   Stress management: assertive convos with kids about money after meeting    Social engagement:  don't back down when other's displeased with minor requests   Nutrition:   Smoking Cessation:   Therapy: journaling        Return to clinic: 2 weeks     Length of Service (minutes direct face-to-face contact): 45    Diagnosis:     ICD-10-CM ICD-9-CM   1. Adjustment disorder with anxious mood  F43.22 309.24           Xavier Núñez, PhD  [place] License #[id number]  [place] License #[id number]

## 2023-03-01 ENCOUNTER — OFFICE VISIT (OUTPATIENT)
Dept: PSYCHIATRY | Facility: CLINIC | Age: 68
End: 2023-03-01
Payer: MEDICARE

## 2023-03-01 DIAGNOSIS — F43.22 ADJUSTMENT DISORDER WITH ANXIOUS MOOD: Primary | ICD-10-CM

## 2023-03-01 PROCEDURE — 99212 OFFICE O/P EST SF 10 MIN: CPT | Mod: PBBFAC | Performed by: PSYCHOLOGIST

## 2023-03-01 PROCEDURE — 99999 PR PBB SHADOW E&M-EST. PATIENT-LVL II: ICD-10-PCS | Mod: PBBFAC,,, | Performed by: PSYCHOLOGIST

## 2023-03-01 PROCEDURE — 99999 PR PBB SHADOW E&M-EST. PATIENT-LVL II: CPT | Mod: PBBFAC,,, | Performed by: PSYCHOLOGIST

## 2023-03-01 PROCEDURE — 90837 PSYTX W PT 60 MINUTES: CPT | Mod: ,,, | Performed by: PSYCHOLOGIST

## 2023-03-01 PROCEDURE — 90837 PR PSYCHOTHERAPY W/PATIENT, 60 MIN: ICD-10-PCS | Mod: ,,, | Performed by: PSYCHOLOGIST

## 2023-03-01 NOTE — PROGRESS NOTES
PSYCHO-ONCOLOGY NOTE/ Individual Psychotherapy     Date: 3/1/2023   Site:  [facility]        Therapeutic Intervention: Met with patient.  Outpatient - Behavior modifying psychotherapy 60 min - CPT code 94812  The patient's last visit with me was on 2/15/2023.      Problem list  Patient Active Problem List   Diagnosis    Cough    Cognitive complaints    Adjustment disorder with anxious mood       Chief complaint/reason for encounter: anxiety   Met with patient to evaluate psychosocial adaptation to caregiving    Current Medications  Current Outpatient Medications   Medication    (Magic mouthwash) 1:1:1 Benadryl 12.5mg/5ml liq, aluminum & magnesium hydroxide-simehticone (Maalox), lidocaine viscous 2%    acetaminophen (TYLENOL) 500 MG tablet    ammonium lactate 12 % Crea    doxycycline (ORACEA) 40 mg capsule    estradiol (ESTRACE) 1 MG tablet    loratadine (CLARITIN) 10 mg tablet    NEXIUM 40 mg capsule    omeprazole (PRILOSEC) 10 MG capsule    ondansetron (ZOFRAN-ODT) 4 MG TbDL    triamterene-hydrochlorothiazide 37.5-25 mg (DYAZIDE) 37.5-25 mg per capsule    vitamin D 1000 units Tab    vitamin E 100 UNIT capsule     No current facility-administered medications for this visit.       Objective:  Verena Toscano arrived promptly for the session.  Ms. Toscano was independently ambulatory at the time of session. The patient was fully cooperative throughout the session.  Appearance: age appropriate, casually  dressed, well groomed  Behavior/Cooperation: friendly and cooperative  Speech: normal in rate, volume, and tone and appropriate quality, quantity and organization of sentences  Mood: anxious  Affect: euthymic  Thought Process: goal-directed, logical  Thought Content: normal,  No delusions or paranoia; did not appear to be responding to internal stimuli during the session  Orientation: grossly intact  Memory: Grossly intact  Attention Span/Concentration: Attends to session without distraction; reports no  difficulty  Fund of Knowledge: average  Estimate of Intelligence: average from verbal skills and history  Cognition: grossly intact  Insight: patient has awareness of illness; good insight into own behavior and behavior of others  Judgment: the patient's behavior is adequate to circumstances    Interval history and content of current session: Patient discussed events and activities since the time of last visit. Discussed current adaptation to 's disease and treatment status and 's adaptation to disease and treatment status. Reports to be coping with improvement. Patient reports continued anxiety when attempting to be assertive with her children and  (trung around finances). Evaluated cognitive response, paying particular attention to negative intrusive thoughts of a persistent and detrimental nature. Thoughts of this type are in evidence with moderate distress. Provided cognitive behavioral therapy to address negative cognitions.  Motivations for withholding information explored.    Discussed financial strains and fears of sharing decision-making with .    Wedding shower 3/11/23.      Prior: discussion of sexual changes: Enjoyable and fulfilling sex life prior to 's illness. Historical use of Viagra, Cialis. 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 Since Padcev, his ability to have an erection (or ejaculate without an erection) is almost gone. She feels his interest is low (although they still cuddle and show other physical affection). She wonders about his thinking about their change in interaction and whether he is interested in other potential assistive devices for intercourse. (Her drive has not changed, is comfortable masturbating, unsure how he thinks about her sexual interest).     Risk parameters:   Patient reports no suicidal ideation  Patient reports no homicidal ideation  Patient reports no  self-injurious behavior  Patient reports no violent behavior   Safety needs:  None at this time      Verbal deficits: None     Patient's response to intervention:The patient's response to intervention is accepting, motivated.     Progress toward goals: Progress as Expected        Patient reported outcomes:      Distress thermometer:   DISTRESS SCREENING 3/1/2023 2/15/2023 1/18/2023 12/21/2022 12/7/2022 11/14/2022 10/26/2022   Distress Score 1 1 3 3 5 2 3   Practical Problems Insurance/Financial Insurance/Financial Insurance/Financial Insurance/Financial - Insurance/Financial -   Family Problems Dealing with Children;Dealing with Partner Dealing with Children;Family Health Issues Dealing with Partner Dealing with Children;Dealing with Partner - Dealing with Children;Dealing with Partner -   Emotional Problems Sadness;Worry Worry Worry Fears;Worry - Worry -   Spiritual / Caodaism Concerns No No No No - No -   Physical Problems Fatigue Pain Pain;Skin Dry/Itchy Pain - Fatigue;Pain -   Other Problems Pain - - - - - -           PHQ-9=5PHQ-9 Total Score: 3 (03/01/23 1308) ; Initial visit: 7       DEANNA-7=3; Initial visit:6   GAD7 3/1/2023 2/15/2023 1/18/2023   1. Feeling nervous, anxious, or on edge? 1 1 1   2. Not being able to stop or control worrying? 0 0 0   3. Worrying too much about different things? 1 1 1   4. Trouble relaxing? 0 0 0   5. Being so restless that it is hard to sit still? 0 0 0   6. Becoming easily annoyed or irritable? 0 0 1   7. Feeling afraid as if something awful might happen? 0 1 0   DEANNA-7 Score 2 3 3          Client Strengths: verbal, intelligent, successful, good social support, good insight, commitment to wellness      Treatment Plan:individual psychotherapy  Target symptoms: adjustment  Why chosen therapy is appropriate versus another modality: relevant to diagnosis, patient responds to this modality, evidence based practice  Outcome monitoring methods: self-report, checklist/rating  scale  Therapeutic intervention type: behavior modifying psychotherapy  Prognosis: Good    Goals from last visit: Met   Behavioral goals prior to next visit:    Exercise:   Stress management: assertive convos with kids about money     Share financial decision-making with    Social engagement:    Nutrition:   Smoking Cessation:   Therapy: journaling        Return to clinic: 2 weeks     Length of Service (minutes direct face-to-face contact): 60    Diagnosis:     ICD-10-CM ICD-9-CM   1. Adjustment disorder with anxious mood  F43.22 309.24           Xavier Núñez, PhD  [place] License #[id number]  [place] License #[id number]

## 2023-03-06 ENCOUNTER — OFFICE VISIT (OUTPATIENT)
Dept: URGENT CARE | Facility: CLINIC | Age: 68
End: 2023-03-06
Payer: MEDICARE

## 2023-03-06 VITALS
BODY MASS INDEX: 31.5 KG/M2 | WEIGHT: 196 LBS | HEART RATE: 74 BPM | RESPIRATION RATE: 19 BRPM | OXYGEN SATURATION: 98 % | SYSTOLIC BLOOD PRESSURE: 166 MMHG | HEIGHT: 66 IN | TEMPERATURE: 98 F | DIASTOLIC BLOOD PRESSURE: 75 MMHG

## 2023-03-06 DIAGNOSIS — Z23 NEED FOR TETANUS BOOSTER: ICD-10-CM

## 2023-03-06 DIAGNOSIS — S91.331A PUNCTURE WOUND OF RIGHT FOOT, INITIAL ENCOUNTER: Primary | ICD-10-CM

## 2023-03-06 PROCEDURE — 90714 TD VACC NO PRESV 7 YRS+ IM: CPT | Mod: S$GLB,,, | Performed by: FAMILY MEDICINE

## 2023-03-06 PROCEDURE — 90471 IMMUNIZATION ADMIN: CPT | Mod: S$GLB,,, | Performed by: FAMILY MEDICINE

## 2023-03-06 PROCEDURE — 99213 PR OFFICE/OUTPT VISIT, EST, LEVL III, 20-29 MIN: ICD-10-PCS | Mod: 25,S$GLB,, | Performed by: FAMILY MEDICINE

## 2023-03-06 PROCEDURE — 90714 TD VACCINE GREATER THAN OR EQUAL TO 7YO WITH PRESERVATIVE IM: ICD-10-PCS | Mod: S$GLB,,, | Performed by: FAMILY MEDICINE

## 2023-03-06 PROCEDURE — 99213 OFFICE O/P EST LOW 20 MIN: CPT | Mod: 25,S$GLB,, | Performed by: FAMILY MEDICINE

## 2023-03-06 PROCEDURE — 90471 TD VACCINE GREATER THAN OR EQUAL TO 7YO WITH PRESERVATIVE IM: ICD-10-PCS | Mod: S$GLB,,, | Performed by: FAMILY MEDICINE

## 2023-03-06 NOTE — PROGRESS NOTES
"Subjective:       Patient ID: Verena Toscano is a 67 y.o. female.    Vitals:  height is 5' 6" (1.676 m) and weight is 88.9 kg (196 lb). Her temperature is 98.2 °F (36.8 °C). Her blood pressure is 166/75 (abnormal) and her pulse is 74. Her respiration is 19 and oxygen saturation is 98%.     Chief Complaint: Puncture Wound (Rt foot )    Two (2) days ago a nail went into her right shoe. No puncture wound to her right foot was visualized but there has since been intermittent pain to the plantar aspect of her right foot. She has a podiatrist.    Other  Chronicity: stepped on nail. The current episode started in the past 7 days. The problem occurs constantly. The problem has been gradually worsening. Pertinent negatives include no chills, congestion, coughing, diaphoresis, myalgias, nausea, neck pain, numbness, rash, swollen glands, urinary symptoms, vertigo, visual change, vomiting or weakness. The symptoms are aggravated by walking. She has tried nothing for the symptoms.     Constitution: Negative for chills and sweating.   HENT:  Negative for congestion.    Neck: Negative for neck pain.   Respiratory:  Negative for cough.    Gastrointestinal:  Negative for nausea and vomiting.   Musculoskeletal:  Negative for muscle ache.   Skin:  Negative for rash.   Neurological:  Negative for history of vertigo and numbness.     Objective:      Vitals:    03/06/23 0841   BP: (!) 166/75   Pulse: 74   Resp: 19   Temp: 98.2 °F (36.8 °C)   SpO2: 98%   Weight: 88.9 kg (196 lb)   Height: 5' 6" (1.676 m)      Physical Exam   Constitutional: She is oriented to person, place, and time.  Non-toxic appearance. She does not appear ill. No distress.   HENT:   Head: Atraumatic.   Eyes: Conjunctivae are normal.   Pulmonary/Chest: Effort normal.   Musculoskeletal:      Comments: No abnormality of right foot; normal range of motion of right foot, no sensory deficit and peripheral pulses intact   Neurological: She is alert and oriented to " person, place, and time.   Skin: Skin is not diaphoretic.   Psychiatric: Judgment and thought content normal.       Assessment:       1. Puncture wound of right foot, initial encounter    2. Need for tetanus booster          Plan:         Puncture wound of right foot, initial encounter  -     (In Office Administered) Td Vaccine  It is possible that she had a superficial graze to the bottom of right foot that has already healed. No signs of infection. No suspicion for retained foreign body. No significant morbidity. She may follow up with podiatry.    2. Need for tetanus booster  -     (In Office Administered) Td Vaccine    I have reviewed labs within the last 1 year.    Patient Instructions   Follow up with podiatry if no improvement or worsening

## 2023-03-08 ENCOUNTER — TELEPHONE (OUTPATIENT)
Dept: INFUSION THERAPY | Facility: HOSPITAL | Age: 68
End: 2023-03-08
Payer: MEDICARE

## 2023-03-09 ENCOUNTER — TELEPHONE (OUTPATIENT)
Dept: INFUSION THERAPY | Facility: HOSPITAL | Age: 68
End: 2023-03-09
Payer: MEDICARE

## 2023-03-22 ENCOUNTER — OFFICE VISIT (OUTPATIENT)
Dept: PSYCHIATRY | Facility: CLINIC | Age: 68
End: 2023-03-22
Payer: MEDICARE

## 2023-03-22 DIAGNOSIS — F43.22 ADJUSTMENT DISORDER WITH ANXIOUS MOOD: Primary | ICD-10-CM

## 2023-03-22 PROCEDURE — 99999 PR PBB SHADOW E&M-EST. PATIENT-LVL II: CPT | Mod: PBBFAC,,, | Performed by: PSYCHOLOGIST

## 2023-03-22 PROCEDURE — 99212 OFFICE O/P EST SF 10 MIN: CPT | Mod: PBBFAC | Performed by: PSYCHOLOGIST

## 2023-03-22 PROCEDURE — 90834 PSYTX W PT 45 MINUTES: CPT | Mod: ,,, | Performed by: PSYCHOLOGIST

## 2023-03-22 PROCEDURE — 90834 PR PSYCHOTHERAPY W/PATIENT, 45 MIN: ICD-10-PCS | Mod: ,,, | Performed by: PSYCHOLOGIST

## 2023-03-22 PROCEDURE — 99999 PR PBB SHADOW E&M-EST. PATIENT-LVL II: ICD-10-PCS | Mod: PBBFAC,,, | Performed by: PSYCHOLOGIST

## 2023-03-22 NOTE — PROGRESS NOTES
PSYCHO-ONCOLOGY NOTE/ Individual Psychotherapy     Date: 3/22/2023   Site:  [address]        Therapeutic Intervention: Met with patient.  Outpatient - Behavior modifying psychotherapy 45 min - CPT code 44072  The patient's last visit with me was on 3/1/2023.    Problem list  Patient Active Problem List   Diagnosis    Cough    Cognitive complaints    Adjustment disorder with anxious mood       Chief complaint/reason for encounter: anxiety   Met with patient to evaluate psychosocial adaptation to caregiving    Current Medications  Current Outpatient Medications   Medication    (Magic mouthwash) 1:1:1 Benadryl 12.5mg/5ml liq, aluminum & magnesium hydroxide-simehticone (Maalox), lidocaine viscous 2%    acetaminophen (TYLENOL) 500 MG tablet    ammonium lactate 12 % Crea    doxycycline (ORACEA) 40 mg capsule    estradiol (ESTRACE) 1 MG tablet    loratadine (CLARITIN) 10 mg tablet    NEXIUM 40 mg capsule    omeprazole (PRILOSEC) 10 MG capsule    ondansetron (ZOFRAN-ODT) 4 MG TbDL    triamterene-hydrochlorothiazide 37.5-25 mg (DYAZIDE) 37.5-25 mg per capsule    vitamin D 1000 units Tab    vitamin E 100 UNIT capsule     No current facility-administered medications for this visit.       Objective:  Verena Toscano arrived promptly for the session.  Ms. Toscano was independently ambulatory at the time of session. The patient was fully cooperative throughout the session.  Appearance: age appropriate, casually  dressed, well groomed  Behavior/Cooperation: friendly and cooperative  Speech: normal in rate, volume, and tone and appropriate quality, quantity and organization of sentences  Mood: anxious  Affect: euthymic  Thought Process: goal-directed, logical  Thought Content: normal,  No delusions or paranoia; did not appear to be responding to internal stimuli during the session  Orientation: grossly intact  Memory: Grossly intact  Attention Span/Concentration: Attends to session without distraction; reports no  difficulty  Fund of Knowledge: average  Estimate of Intelligence: average from verbal skills and history  Cognition: grossly intact  Insight: patient has awareness of illness; good insight into own behavior and behavior of others  Judgment: the patient's behavior is adequate to circumstances    Interval history and content of current session: Patient discussed events and activities since the time of last visit.  Patient reports continued anxiety when attempting to be assertive with her children around finances. Has been including her  more in decision-making.  Evaluated cognitive response, paying particular attention to negative intrusive thoughts of a persistent and detrimental nature. Thoughts of this type are in evidence with moderate distress. Provided cognitive behavioral therapy to address negative cognitions.  Coping self-talk around financial discussions practiced.      Wedding shower went well. Actual wedding 4/2024.      Prior: discussion of sexual changes: Enjoyable and fulfilling sex life prior to 's illness. Historical use of Viagra, Cialis. 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 Since Padcev, his ability to have an erection (or ejaculate without an erection) is almost gone. She feels his interest is low (although they still cuddle and show other physical affection). She wonders about his thinking about their change in interaction and whether he is interested in other potential assistive devices for intercourse. (Her drive has not changed, is comfortable masturbating, unsure how he thinks about her sexual interest).     Risk parameters:   Patient reports no suicidal ideation  Patient reports no homicidal ideation  Patient reports no self-injurious behavior  Patient reports no violent behavior   Safety needs:  None at this time      Verbal deficits: None     Patient's response to intervention:The patient's  response to intervention is accepting, motivated.     Progress toward goals: Progress as Expected        Patient reported outcomes:      Distress thermometer:   DISTRESS SCREENING 3/22/2023 3/1/2023 2/15/2023 1/18/2023 12/21/2022 12/7/2022 11/14/2022   Distress Score 3 1 1 3 3 5 2   Practical Problems Insurance/Financial Insurance/Financial Insurance/Financial Insurance/Financial Insurance/Financial - Insurance/Financial   Family Problems Dealing with Children;Dealing with Partner Dealing with Children;Dealing with Partner Dealing with Children;Family Health Issues Dealing with Partner Dealing with Children;Dealing with Partner - Dealing with Children;Dealing with Partner   Emotional Problems Fears;Nervousness;Worry Sadness;Worry Worry Worry Fears;Worry - Worry   Spiritual / Rastafari Concerns No No No No No - No   Physical Problems Pain Fatigue Pain Pain;Skin Dry/Itchy Pain - Fatigue;Pain   Other Problems - Pain - - - - -           PHQ-9=5PHQ-9 Total Score: 1 (03/22/23 1318) ; Initial visit: 7       DEANNA-7=3; Initial visit:6   GAD7 3/22/2023 3/1/2023 2/15/2023   1. Feeling nervous, anxious, or on edge? 1 1 1   2. Not being able to stop or control worrying? 0 0 0   3. Worrying too much about different things? 1 1 1   4. Trouble relaxing? 0 0 0   5. Being so restless that it is hard to sit still? 0 0 0   6. Becoming easily annoyed or irritable? 1 0 0   7. Feeling afraid as if something awful might happen? 0 0 1   DEANNA-7 Score 3 2 3          Client Strengths: verbal, intelligent, successful, good social support, good insight, commitment to wellness      Treatment Plan:individual psychotherapy  Target symptoms: adjustment  Why chosen therapy is appropriate versus another modality: relevant to diagnosis, patient responds to this modality, evidence based practice  Outcome monitoring methods: self-report, checklist/rating scale  Therapeutic intervention type: behavior modifying psychotherapy  Prognosis: Good    Goals from  last visit: Met   Behavioral goals prior to next visit:    Exercise:   Stress management: assertive convos with kids about money    Social engagement:    Nutrition:   Smoking Cessation:   Therapy: journaling        Return to clinic: 2 weeks     Length of Service (minutes direct face-to-face contact): 45    Diagnosis:     ICD-10-CM ICD-9-CM   1. Adjustment disorder with anxious mood  F43.22 309.24             Xavier Núñez, PhD  [place] License #[id number]  [place] License #[id number]

## 2023-04-13 ENCOUNTER — TELEPHONE (OUTPATIENT)
Dept: INFUSION THERAPY | Facility: HOSPITAL | Age: 68
End: 2023-04-13
Payer: MEDICARE

## 2023-04-13 ENCOUNTER — OFFICE VISIT (OUTPATIENT)
Dept: PSYCHIATRY | Facility: CLINIC | Age: 68
End: 2023-04-13
Payer: MEDICARE

## 2023-04-13 DIAGNOSIS — F43.22 ADJUSTMENT DISORDER WITH ANXIOUS MOOD: Primary | ICD-10-CM

## 2023-04-13 PROCEDURE — 90837 PR PSYCHOTHERAPY W/PATIENT, 60 MIN: ICD-10-PCS | Mod: 95,,, | Performed by: PSYCHOLOGIST

## 2023-04-13 PROCEDURE — 90837 PSYTX W PT 60 MINUTES: CPT | Mod: 95,,, | Performed by: PSYCHOLOGIST

## 2023-04-13 NOTE — PROGRESS NOTES
Telemedicine PSYCHO-ONCOLOGY NOTE/ Individual Psychotherapy     Consultation started: 4/13/2023 at 4:57 PM   The patient location is: Patient home in [place], [place]  Virtual visit with synchronous audio and video   Patient alone at the time of consultation    Crisis Disclaimer: Patient was informed that due to the virtual nature of the visit, that if a crisis develops, protocols will be implemented to ensure patient safety, including but not limited to: 1) Initiating a welfare check with local Law Enforcement, 2) Calling 1/National Crisis Hotline, and/or 3) Initiating PEC/CEC procedures.     Each patient provided medical services by telemedicine is:  (1) informed of the relationship between the physician and patient and the respective role of any other health care provider with respect to management of the patient; and (2) notified that he or she may decline to receive medical services by telemedicine and may withdraw from such care at any time.    Date: 4/13/2023   Site of therapist:  [facility]          Therapeutic Intervention: Met with patient.  Outpatient - Behavior modifying psychotherapy 60 min - CPT code 36895  The patient's last visit with me was on 3/22/2023 (in person).      Problem list  Patient Active Problem List   Diagnosis    Cough    Cognitive complaints    Adjustment disorder with anxious mood       Chief complaint/reason for encounter: anxiety   Met with patient to evaluate psychosocial adaptation to caregiving    Current Medications  Current Outpatient Medications   Medication    (Magic mouthwash) 1:1:1 Benadryl 12.5mg/5ml liq, aluminum & magnesium hydroxide-simehticone (Maalox), lidocaine viscous 2%    acetaminophen (TYLENOL) 500 MG tablet    ammonium lactate 12 % Crea    doxycycline (ORACEA) 40 mg capsule    estradiol (ESTRACE) 1 MG tablet    loratadine (CLARITIN) 10 mg tablet    NEXIUM 40 mg capsule    omeprazole (PRILOSEC) 10 MG capsule    ondansetron (ZOFRAN-ODT) 4 MG TbDL     triamterene-hydrochlorothiazide 37.5-25 mg (DYAZIDE) 37.5-25 mg per capsule    vitamin D 1000 units Tab    vitamin E 100 UNIT capsule     No current facility-administered medications for this visit.       Objective:  Verena Toscano arrived promptly for the session.  Ms. Toscano was independently ambulatory at the time of session. The patient was fully cooperative throughout the session.  Appearance: age appropriate, casually  dressed, well groomed  Behavior/Cooperation: friendly and cooperative  Speech: normal in rate, volume, and tone and appropriate quality, quantity and organization of sentences  Mood: anxious  Affect: euthymic  Thought Process: goal-directed, logical  Thought Content: normal,  No delusions or paranoia; did not appear to be responding to internal stimuli during the session  Orientation: grossly intact  Memory: Grossly intact  Attention Span/Concentration: Attends to session without distraction; reports no difficulty  Fund of Knowledge: average  Estimate of Intelligence: average from verbal skills and history  Cognition: grossly intact  Insight: patient has awareness of illness; good insight into own behavior and behavior of others  Judgment: the patient's behavior is adequate to circumstances    Interval history and content of current session: Patient discussed events and activities since the time of last visit.  Patient reports continued anxiety due to attempts to cope with financial strain. She had several discussions with her  around budgeting, with high anxiety, but good outcomes.  Evaluated cognitive response, paying particular attention to negative intrusive thoughts of a persistent and detrimental nature. Thoughts of this type are in evidence with moderate distress. Provided cognitive behavioral therapy to address negative cognitions.  Coping self-talk around financial discussions is being practiced. She is engaging in active coping strategies.They are trying to sell his life  insurance policy.  Financial toxicity of cancer discussed.     Wedding shower went well. Actual wedding 4/2024.      Prior: discussion of sexual changes: Enjoyable and fulfilling sex life prior to 's illness. Historical use of Viagra, Cialis. 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 Since Padcev, his ability to have an erection (or ejaculate without an erection) is almost gone. She feels his interest is low (although they still cuddle and show other physical affection). She wonders about his thinking about their change in interaction and whether he is interested in other potential assistive devices for intercourse. (Her drive has not changed, is comfortable masturbating, unsure how he thinks about her sexual interest).     Risk parameters:   Patient reports no suicidal ideation  Patient reports no homicidal ideation  Patient reports no self-injurious behavior  Patient reports no violent behavior   Safety needs:  None at this time      Verbal deficits: None     Patient's response to intervention:The patient's response to intervention is accepting, motivated.     Progress toward goals: Progress as Expected        Patient reported outcomes:      Distress thermometer:   DISTRESS SCREENING 4/13/2023 3/22/2023 3/1/2023 2/15/2023 1/18/2023 12/21/2022 12/7/2022   Distress Score 7 3 1 1 3 3 5   Practical Problems Insurance/Financial Insurance/Financial Insurance/Financial Insurance/Financial Insurance/Financial Insurance/Financial -   Family Problems Dealing with Partner Dealing with Children;Dealing with Partner Dealing with Children;Dealing with Partner Dealing with Children;Family Health Issues Dealing with Partner Dealing with Children;Dealing with Partner -   Emotional Problems Fears;Sadness;Worry Fears;Nervousness;Worry Sadness;Worry Worry Worry Fears;Worry -   Spiritual / Hinduism Concerns No No No No No No -   Physical  Problems Pain Pain Fatigue Pain Pain;Skin Dry/Itchy Pain -   Other Problems - - Pain - - - -           PHQ-9=5PHQ-9 Total Score: (P) 4 (04/13/23 1252) ; Initial visit: 7       DEANNA-7=3; Initial visit:6   GAD7 4/13/2023 3/22/2023 3/1/2023   1. Feeling nervous, anxious, or on edge? 3 1 1   2. Not being able to stop or control worrying? 1 0 0   3. Worrying too much about different things? 2 1 1   4. Trouble relaxing? 1 0 0   5. Being so restless that it is hard to sit still? 1 0 0   6. Becoming easily annoyed or irritable? 1 1 0   7. Feeling afraid as if something awful might happen? 3 0 0   DEANNA-7 Score 12 3 2          Client Strengths: verbal, intelligent, successful, good social support, good insight, commitment to wellness      Treatment Plan:individual psychotherapy  Target symptoms: adjustment  Why chosen therapy is appropriate versus another modality: relevant to diagnosis, patient responds to this modality, evidence based practice  Outcome monitoring methods: self-report, checklist/rating scale  Therapeutic intervention type: behavior modifying psychotherapy  Prognosis: Good    Goals from last visit: Met   Behavioral goals prior to next visit:    Exercise:   Stress management: continued assertive convos with kids about money (ask son's preferences for boat)   Social engagement:    Nutrition:   Smoking Cessation:   Therapy: journaling        Return to clinic: 2 weeks     Length of Service (minutes direct face-to-face contact): 60    Diagnosis:     ICD-10-CM ICD-9-CM   1. Adjustment disorder with anxious mood  F43.22 309.24             Xavier Núñez, PhD  [place] License #[id number]  [place] License #[id number]

## 2023-04-27 ENCOUNTER — OFFICE VISIT (OUTPATIENT)
Dept: PSYCHIATRY | Facility: CLINIC | Age: 68
End: 2023-04-27
Payer: MEDICARE

## 2023-04-27 DIAGNOSIS — F43.22 ADJUSTMENT DISORDER WITH ANXIOUS MOOD: Primary | ICD-10-CM

## 2023-04-27 PROCEDURE — 99999 PR PBB SHADOW E&M-EST. PATIENT-LVL II: ICD-10-PCS | Mod: PBBFAC,,, | Performed by: PSYCHOLOGIST

## 2023-04-27 PROCEDURE — 99999 PR PBB SHADOW E&M-EST. PATIENT-LVL II: CPT | Mod: PBBFAC,,, | Performed by: PSYCHOLOGIST

## 2023-04-27 PROCEDURE — 90837 PSYTX W PT 60 MINUTES: CPT | Mod: ,,, | Performed by: PSYCHOLOGIST

## 2023-04-27 PROCEDURE — 99212 OFFICE O/P EST SF 10 MIN: CPT | Mod: PBBFAC | Performed by: PSYCHOLOGIST

## 2023-04-27 PROCEDURE — 90837 PR PSYCHOTHERAPY W/PATIENT, 60 MIN: ICD-10-PCS | Mod: ,,, | Performed by: PSYCHOLOGIST

## 2023-04-27 NOTE — PROGRESS NOTES
Telemedicine PSYCHO-ONCOLOGY NOTE/ Individual Psychotherapy       Date: 4/27/2023   Site of therapist:  [facility]          Therapeutic Intervention: Met with patient.  Outpatient - Behavior modifying psychotherapy 60 min - CPT code 64140  The patient's last visit with me was on 4/13/2023.    Problem list  Patient Active Problem List   Diagnosis    Cough    Cognitive complaints    Adjustment disorder with anxious mood       Chief complaint/reason for encounter: anxiety   Met with patient to evaluate psychosocial adaptation to caregiving    Current Medications  Current Outpatient Medications   Medication    (Magic mouthwash) 1:1:1 Benadryl 12.5mg/5ml liq, aluminum & magnesium hydroxide-simehticone (Maalox), lidocaine viscous 2%    acetaminophen (TYLENOL) 500 MG tablet    ammonium lactate 12 % Crea    doxycycline (ORACEA) 40 mg capsule    estradiol (ESTRACE) 1 MG tablet    loratadine (CLARITIN) 10 mg tablet    NEXIUM 40 mg capsule    omeprazole (PRILOSEC) 10 MG capsule    ondansetron (ZOFRAN-ODT) 4 MG TbDL    triamterene-hydrochlorothiazide 37.5-25 mg (DYAZIDE) 37.5-25 mg per capsule    vitamin D 1000 units Tab    vitamin E 100 UNIT capsule     No current facility-administered medications for this visit.       Objective:  Verena Toscano arrived promptly for the session.  Ms. Toscano was independently ambulatory at the time of session. The patient was fully cooperative throughout the session.  Appearance: age appropriate, casually  dressed, well groomed  Behavior/Cooperation: friendly and cooperative  Speech: normal in rate, volume, and tone and appropriate quality, quantity and organization of sentences  Mood: anxious  Affect: euthymic  Thought Process: goal-directed, logical  Thought Content: normal,  No delusions or paranoia; did not appear to be responding to internal stimuli during the session  Orientation: grossly intact  Memory: Grossly intact  Attention Span/Concentration: Attends to session  without distraction; reports no difficulty  Fund of Knowledge: average  Estimate of Intelligence: average from verbal skills and history  Cognition: grossly intact  Insight: patient has awareness of illness; good insight into own behavior and behavior of others  Judgment: the patient's behavior is adequate to circumstances    Interval history and content of current session: Patient discussed events and activities since the time of last visit.  Patient reports excellent efforts toward coping with financial strain. She had several discussions with her  around budgeting, with high anxiety, but good outcomes. She spoke to her children about their contributions to the difficulties and her expectations around their resumption of their own debts.  Evaluated cognitive response, paying particular attention to negative intrusive thoughts of a persistent and detrimental nature. Thoughts of this type are in evidence with mild distress and immediate challenges. Provided cognitive behavioral therapy to address negative cognitions.  Coping self-talk around financial discussions is being practiced. She is engaging in active coping strategies.    Patient coped well with a difficult work situation. Her comfort with setting boundaries is improving.    Prior: discussion of sexual changes: Enjoyable and fulfilling sex life prior to 's illness. Historical use of Viagra, Cialis. 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 Since Padcev, his ability to have an erection (or ejaculate without an erection) is almost gone. She feels his interest is low (although they still cuddle and show other physical affection). She wonders about his thinking about their change in interaction and whether he is interested in other potential assistive devices for intercourse. (Her drive has not changed, is comfortable masturbating, unsure how he thinks about her  sexual interest).     Risk parameters:   Patient reports no suicidal ideation  Patient reports no homicidal ideation  Patient reports no self-injurious behavior  Patient reports no violent behavior   Safety needs:  None at this time      Verbal deficits: None     Patient's response to intervention:The patient's response to intervention is accepting, motivated.     Progress toward goals: Progress as Expected        Patient reported outcomes:      Distress thermometer:   DISTRESS SCREENING 4/27/2023 4/13/2023 3/22/2023 3/1/2023 2/15/2023 1/18/2023 12/21/2022   Distress Score 1 7 3 1 1 3 3   Practical Problems Insurance/Financial Insurance/Financial Insurance/Financial Insurance/Financial Insurance/Financial Insurance/Financial Insurance/Financial   Family Problems None of these Dealing with Partner Dealing with Children;Dealing with Partner Dealing with Children;Dealing with Partner Dealing with Children;Family Health Issues Dealing with Partner Dealing with Children;Dealing with Partner   Emotional Problems Sadness Fears;Sadness;Worry Fears;Nervousness;Worry Sadness;Worry Worry Worry Fears;Worry   Spiritual / Advent Concerns No No No No No No No   Physical Problems Pain Pain Pain Fatigue Pain Pain;Skin Dry/Itchy Pain   Other Problems - - - Pain - - -           PHQ-9=5PHQ-9 Total Score: 2 (04/27/23 1353) ; Initial visit: 7       DEANNA-7=3; Initial visit:6   GAD7 4/27/2023 4/13/2023 3/22/2023   1. Feeling nervous, anxious, or on edge? 0 3 1   2. Not being able to stop or control worrying? 0 1 0   3. Worrying too much about different things? 0 2 1   4. Trouble relaxing? 0 1 0   5. Being so restless that it is hard to sit still? 0 1 0   6. Becoming easily annoyed or irritable? 0 1 1   7. Feeling afraid as if something awful might happen? 0 3 0   DEANNA-7 Score 0 12 3          Client Strengths: verbal, intelligent, successful, good social support, good insight, commitment to wellness      Treatment Plan:individual  psychotherapy  Target symptoms: adjustment  Why chosen therapy is appropriate versus another modality: relevant to diagnosis, patient responds to this modality, evidence based practice  Outcome monitoring methods: self-report, checklist/rating scale  Therapeutic intervention type: behavior modifying psychotherapy  Prognosis: Good    Goals from last visit: Met   Behavioral goals prior to next visit:    Exercise:   Stress management: continued commitment to changing financial habits   Social engagement:    Nutrition:   Smoking Cessation:   Therapy: journaling        Return to clinic: 2 weeks     Length of Service (minutes direct face-to-face contact): 60    Diagnosis:     ICD-10-CM ICD-9-CM   1. Adjustment disorder with anxious mood  F43.22 309.24             Xavier Núñez, PhD  [place] License #[id number]  [place] License #[id number]

## 2023-05-31 ENCOUNTER — OFFICE VISIT (OUTPATIENT)
Dept: PSYCHIATRY | Facility: CLINIC | Age: 68
End: 2023-05-31
Payer: MEDICARE

## 2023-05-31 DIAGNOSIS — F43.22 ADJUSTMENT DISORDER WITH ANXIOUS MOOD: Primary | ICD-10-CM

## 2023-05-31 PROCEDURE — 90837 PSYTX W PT 60 MINUTES: CPT | Mod: ,,, | Performed by: PSYCHOLOGIST

## 2023-05-31 PROCEDURE — 90837 PR PSYCHOTHERAPY W/PATIENT, 60 MIN: ICD-10-PCS | Mod: ,,, | Performed by: PSYCHOLOGIST

## 2023-05-31 PROCEDURE — 99212 OFFICE O/P EST SF 10 MIN: CPT | Mod: PBBFAC | Performed by: PSYCHOLOGIST

## 2023-05-31 PROCEDURE — 99999 PR PBB SHADOW E&M-EST. PATIENT-LVL II: ICD-10-PCS | Mod: PBBFAC,,, | Performed by: PSYCHOLOGIST

## 2023-05-31 PROCEDURE — 99999 PR PBB SHADOW E&M-EST. PATIENT-LVL II: CPT | Mod: PBBFAC,,, | Performed by: PSYCHOLOGIST

## 2023-05-31 NOTE — PROGRESS NOTES
Telemedicine PSYCHO-ONCOLOGY NOTE/ Individual Psychotherapy       Date: 5/31/2023   [facility] of therapist:  [facility]          Therapeutic Intervention: Met with patient.  Outpatient - Behavior modifying psychotherapy 60 min - CPT code 58793  The patient's last visit with me was on 4/27/2023.    Problem list  Patient Active Problem List   Diagnosis    Cough    Cognitive complaints    Adjustment disorder with anxious mood       Chief complaint/reason for encounter: anxiety   Met with patient to evaluate psychosocial adaptation to caregiving    Current Medications  Current Outpatient Medications   Medication    (Magic mouthwash) 1:1:1 Benadryl 12.5mg/5ml liq, aluminum & magnesium hydroxide-simehticone (Maalox), lidocaine viscous 2%    acetaminophen (TYLENOL) 500 MG tablet    ammonium lactate 12 % Crea    doxycycline (ORACEA) 40 mg capsule    estradiol (ESTRACE) 1 MG tablet    loratadine (CLARITIN) 10 mg tablet    NEXIUM 40 mg capsule    omeprazole (PRILOSEC) 10 MG capsule    ondansetron (ZOFRAN-ODT) 4 MG TbDL    triamterene-hydrochlorothiazide 37.5-25 mg (DYAZIDE) 37.5-25 mg per capsule    vitamin D 1000 units Tab    vitamin E 100 UNIT capsule     No current facility-administered medications for this visit.       Objective:  Verena Toscano arrived promptly for the session.  Ms. Toscano was independently ambulatory at the time of session. The patient was fully cooperative throughout the session.  Appearance: age appropriate, casually  dressed, well groomed  Behavior/Cooperation: friendly and cooperative  Speech: normal in rate, volume, and tone and appropriate quality, quantity and organization of sentences  Mood: anxious  Affect: euthymic  Thought Process: goal-directed, logical  Thought Content: normal,  No delusions or paranoia; did not appear to be responding to internal stimuli during the session  Orientation: grossly intact  Memory: Grossly intact  Attention Span/Concentration: Attends to session  without distraction; reports no difficulty  Fund of Knowledge: average  Estimate of Intelligence: average from verbal skills and history  Cognition: grossly intact  Insight: patient has awareness of illness; good insight into own behavior and behavior of others  Judgment: the patient's behavior is adequate to circumstances    Interval history and content of current session: Patient discussed events and activities since the time of last visit.  Patient reports excellent efforts toward coping with financial strain. She has some additional anxiety due to delayed passport. Has used appropriate problem solving and coping.   Evaluated cognitive response, paying particular attention to negative intrusive thoughts of a persistent and detrimental nature. Thoughts of this type are largely absent.       Patient coping well with a difficult work situation. Ethics consult this afternoon. Her comfort with setting boundaries is improving.  Discussed patient challenges.    Prior: discussion of sexual changes: Enjoyable and fulfilling sex life prior to 's illness. Historical use of Viagra, Cialis. 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 Since Padcev, his ability to have an erection (or ejaculate without an erection) is almost gone. She feels his interest is low (although they still cuddle and show other physical affection). She wonders about his thinking about their change in interaction and whether he is interested in other potential assistive devices for intercourse. (Her drive has not changed, is comfortable masturbating, unsure how he thinks about her sexual interest).     Risk parameters:   Patient reports no suicidal ideation  Patient reports no homicidal ideation  Patient reports no self-injurious behavior  Patient reports no violent behavior   Safety needs:  None at this time      Verbal deficits: None     Patient's response to  intervention:The patient's response to intervention is accepting, motivated.     Progress toward goals: Progress as Expected        Patient reported outcomes:      Distress thermometer:   DISTRESS SCREENING 5/31/2023 4/27/2023 4/13/2023 3/22/2023 3/1/2023 2/15/2023 1/18/2023   Distress Score 3 1 7 3 1 1 3   Practical Problems Insurance/Financial;None of these Insurance/Financial Insurance/Financial Insurance/Financial Insurance/Financial Insurance/Financial Insurance/Financial   Family Problems None of these None of these Dealing with Partner Dealing with Children;Dealing with Partner Dealing with Children;Dealing with Partner Dealing with Children;Family Health Issues Dealing with Partner   Emotional Problems Fears;Nervousness;Worry Sadness Fears;Sadness;Worry Fears;Nervousness;Worry Sadness;Worry Worry Worry   Spiritual / Latter-day Concerns No No No No No No No   Physical Problems Pain Pain Pain Pain Fatigue Pain Pain;Skin Dry/Itchy   Other Problems - - - - Pain - -           PHQ-9=5PHQ-9 Total Score: 3 (05/31/23 1239) ; Initial visit: 7       DEANNA-7=3; Initial visit:6   GAD7 5/31/2023 4/27/2023 4/13/2023   1. Feeling nervous, anxious, or on edge? 1 0 3   2. Not being able to stop or control worrying? 1 0 1   3. Worrying too much about different things? 1 0 2   4. Trouble relaxing? 1 0 1   5. Being so restless that it is hard to sit still? 0 0 1   6. Becoming easily annoyed or irritable? 1 0 1   7. Feeling afraid as if something awful might happen? 1 0 3   DEANNA-7 Score 6 0 12          Client Strengths: verbal, intelligent, successful, good social support, good insight, commitment to wellness      Treatment Plan:individual psychotherapy  Target symptoms: adjustment  Why chosen therapy is appropriate versus another modality: relevant to diagnosis, patient responds to this modality, evidence based practice  Outcome monitoring methods: self-report, checklist/rating scale  Therapeutic intervention type: behavior  modifying psychotherapy  Prognosis: Good    Goals from last visit: Met   Behavioral goals prior to next visit:    Exercise:   Stress management: continued commitment to changing financial habits- Update budget   Social engagement:  Deadline to [place]   Nutrition:   Smoking Cessation:   Therapy: Coping self-talk around [place] trip        Return to clinic: 2 weeks     Length of Service (minutes direct face-to-face contact): 60    Diagnosis:     ICD-10-CM ICD-9-CM   1. Adjustment disorder with anxious mood  F43.22 309.24             Xavier Núñez, PhD  [place] License #[id number]  [place] License #[id number]

## 2023-06-15 ENCOUNTER — OFFICE VISIT (OUTPATIENT)
Dept: PSYCHIATRY | Facility: CLINIC | Age: 68
End: 2023-06-15
Payer: MEDICARE

## 2023-06-15 DIAGNOSIS — F43.22 ADJUSTMENT DISORDER WITH ANXIOUS MOOD: Primary | ICD-10-CM

## 2023-06-15 PROCEDURE — 99999 PR PBB SHADOW E&M-EST. PATIENT-LVL I: ICD-10-PCS | Mod: PBBFAC,,, | Performed by: PSYCHOLOGIST

## 2023-06-15 PROCEDURE — 99211 OFF/OP EST MAY X REQ PHY/QHP: CPT | Mod: PBBFAC | Performed by: PSYCHOLOGIST

## 2023-06-15 PROCEDURE — 99999 PR PBB SHADOW E&M-EST. PATIENT-LVL I: CPT | Mod: PBBFAC,,, | Performed by: PSYCHOLOGIST

## 2023-06-15 PROCEDURE — 90837 PR PSYCHOTHERAPY W/PATIENT, 60 MIN: ICD-10-PCS | Mod: ,,, | Performed by: PSYCHOLOGIST

## 2023-06-15 PROCEDURE — 90837 PSYTX W PT 60 MINUTES: CPT | Mod: ,,, | Performed by: PSYCHOLOGIST

## 2023-06-15 NOTE — PROGRESS NOTES
Telemedicine PSYCHO-ONCOLOGY NOTE/ Individual Psychotherapy       Date: 6/15/2023   [facility] of therapist[facility]          Therapeutic Intervention: Met with patient.  Outpatient - Behavior modifying psychotherapy 60 min - CPT code 24505  The patient's last visit with me was on 5/31/2023.    Problem list  Patient Active Problem List   Diagnosis    Cough    Cognitive complaints    Adjustment disorder with anxious mood       Chief complaint/reason for encounter: anxiety   Met with patient to evaluate psychosocial adaptation to caregiving    Current Medications  Current Outpatient Medications   Medication    (Magic mouthwash) 1:1:1 Benadryl 12.5mg/5ml liq, aluminum & magnesium hydroxide-simehticone (Maalox), lidocaine viscous 2%    acetaminophen (TYLENOL) 500 MG tablet    ammonium lactate 12 % Crea    doxycycline (ORACEA) 40 mg capsule    estradiol (ESTRACE) 1 MG tablet    loratadine (CLARITIN) 10 mg tablet    NEXIUM 40 mg capsule    omeprazole (PRILOSEC) 10 MG capsule    ondansetron (ZOFRAN-ODT) 4 MG TbDL    triamterene-hydrochlorothiazide 37.5-25 mg (DYAZIDE) 37.5-25 mg per capsule    vitamin D 1000 units Tab    vitamin E 100 UNIT capsule     No current facility-administered medications for this visit.       Objective:  Verena Toscano arrived promptly for the session.  Ms. Toscano was independently ambulatory at the time of session. The patient was fully cooperative throughout the session.  Appearance: age appropriate, casually  dressed, well groomed  Behavior/Cooperation: friendly and cooperative  Speech: normal in rate, volume, and tone and appropriate quality, quantity and organization of sentences  Mood: anxious  Affect: tearful  Thought Process: goal-directed, logical  Thought Content: normal,  No delusions or paranoia; did not appear to be responding to internal stimuli during the session  Orientation: grossly intact  Memory: Grossly intact  Attention Span/Concentration: Attends to session  without distraction; reports no difficulty  Fund of Knowledge: average  Estimate of Intelligence: average from verbal skills and history  Cognition: grossly intact  Insight: patient has awareness of illness; good insight into own behavior and behavior of others  Judgment: the patient's behavior is adequate to circumstances    Interval history and content of current session: Patient discussed events and activities since the time of last visit.  Patient reports distress due to son's diagnosis yesterday with DM.   Evaluated cognitive response, paying particular attention to negative intrusive thoughts of a persistent and detrimental nature. Thoughts of this type are present with moderate distress. Discussed coping and reframing (hassle v crisis).      Prior: discussion of sexual changes: Enjoyable and fulfilling sex life prior to 's illness. Historical use of Viagra, Cialis. 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 Since Padcev, his ability to have an erection (or ejaculate without an erection) is almost gone. She feels his interest is low (although they still cuddle and show other physical affection). She wonders about his thinking about their change in interaction and whether he is interested in other potential assistive devices for intercourse. (Her drive has not changed, is comfortable masturbating, unsure how he thinks about her sexual interest).     Risk parameters:   Patient reports no suicidal ideation  Patient reports no homicidal ideation  Patient reports no self-injurious behavior  Patient reports no violent behavior   Safety needs:  None at this time      Verbal deficits: None     Patient's response to intervention:The patient's response to intervention is accepting, motivated.     Progress toward goals: Progress as Expected        Patient reported outcomes:      Distress thermometer:   DISTRESS SCREENING 5/31/2023  4/27/2023 4/13/2023 3/22/2023 3/1/2023 2/15/2023 1/18/2023   Distress Score 3 1 7 3 1 1 3   Practical Problems Insurance/Financial;None of these Insurance/Financial Insurance/Financial Insurance/Financial Insurance/Financial Insurance/Financial Insurance/Financial   Family Problems None of these None of these Dealing with Partner Dealing with Children;Dealing with Partner Dealing with Children;Dealing with Partner Dealing with Children;Family Health Issues Dealing with Partner   Emotional Problems Fears;Nervousness;Worry Sadness Fears;Sadness;Worry Fears;Nervousness;Worry Sadness;Worry Worry Worry   Spiritual / Hindu Concerns No No No No No No No   Physical Problems Pain Pain Pain Pain Fatigue Pain Pain;Skin Dry/Itchy   Other Problems - - - - Pain - -           PHQ-9=2  ; Initial visit: 7       DEANNA-7=7; Initial visit:6   GAD7 5/31/2023 4/27/2023 4/13/2023   1. Feeling nervous, anxious, or on edge? 1 0 3   2. Not being able to stop or control worrying? 1 0 1   3. Worrying too much about different things? 1 0 2   4. Trouble relaxing? 1 0 1   5. Being so restless that it is hard to sit still? 0 0 1   6. Becoming easily annoyed or irritable? 1 0 1   7. Feeling afraid as if something awful might happen? 1 0 3   DEANNA-7 Score 6 0 12          Client Strengths: verbal, intelligent, successful, good social support, good insight, commitment to wellness      Treatment Plan:individual psychotherapy  Target symptoms: adjustment  Why chosen therapy is appropriate versus another modality: relevant to diagnosis, patient responds to this modality, evidence based practice  Outcome monitoring methods: self-report, checklist/rating scale  Therapeutic intervention type: behavior modifying psychotherapy  Prognosis: Good    Goals from last visit: Met   Behavioral goals prior to next visit:    Exercise:   Stress management: continued commitment to changing financial habits- Update budget   Social engagement:     Nutrition:   Smoking  Cessation:   Therapy: Coping self-talk around [place] trip        Return to clinic: 2 weeks     Length of Service (minutes direct face-to-face contact): 60    Diagnosis:     ICD-10-CM ICD-9-CM   1. Adjustment disorder with anxious mood  F43.22 309.24             Xavier Núñez, PhD  [place] License #[id number]  [place] License #[id number]

## 2023-06-16 ENCOUNTER — PATIENT MESSAGE (OUTPATIENT)
Dept: PSYCHIATRY | Facility: CLINIC | Age: 68
End: 2023-06-16
Payer: MEDICARE

## 2023-07-12 ENCOUNTER — OFFICE VISIT (OUTPATIENT)
Dept: PSYCHIATRY | Facility: CLINIC | Age: 68
End: 2023-07-12
Payer: MEDICARE

## 2023-07-12 DIAGNOSIS — F43.22 ADJUSTMENT DISORDER WITH ANXIOUS MOOD: Primary | ICD-10-CM

## 2023-07-12 PROCEDURE — 99212 OFFICE O/P EST SF 10 MIN: CPT | Mod: PBBFAC | Performed by: PSYCHOLOGIST

## 2023-07-12 PROCEDURE — 90837 PSYTX W PT 60 MINUTES: CPT | Mod: ,,, | Performed by: PSYCHOLOGIST

## 2023-07-12 PROCEDURE — 90837 PR PSYCHOTHERAPY W/PATIENT, 60 MIN: ICD-10-PCS | Mod: ,,, | Performed by: PSYCHOLOGIST

## 2023-07-12 PROCEDURE — 99999 PR PBB SHADOW E&M-EST. PATIENT-LVL II: ICD-10-PCS | Mod: PBBFAC,,, | Performed by: PSYCHOLOGIST

## 2023-07-12 PROCEDURE — 99999 PR PBB SHADOW E&M-EST. PATIENT-LVL II: CPT | Mod: PBBFAC,,, | Performed by: PSYCHOLOGIST

## 2023-07-12 NOTE — PROGRESS NOTES
PSYCHO-ONCOLOGY NOTE/ Individual Psychotherapy       Date: 7/12/2023   [facility] therapist[facility]          Therapeutic Intervention: Met with patient.  Outpatient - Behavior modifying psychotherapy 60 min - CPT code 30065  The patient's last visit with me was on 6/15/2023.    Problem list  Patient Active Problem List   Diagnosis    Cough    Cognitive complaints    Adjustment disorder with anxious mood       Chief complaint/reason for encounter: anxiety   Met with patient to evaluate psychosocial adaptation to caregiving    Current Medications  Current Outpatient Medications   Medication    (Magic mouthwash) 1:1:1 Benadryl 12.5mg/5ml liq, aluminum & magnesium hydroxide-simehticone (Maalox), lidocaine viscous 2%    acetaminophen (TYLENOL) 500 MG tablet    ammonium lactate 12 % Crea    doxycycline (ORACEA) 40 mg capsule    estradiol (ESTRACE) 1 MG tablet    loratadine (CLARITIN) 10 mg tablet    NEXIUM 40 mg capsule    omeprazole (PRILOSEC) 10 MG capsule    ondansetron (ZOFRAN-ODT) 4 MG TbDL    triamterene-hydrochlorothiazide 37.5-25 mg (DYAZIDE) 37.5-25 mg per capsule    vitamin D 1000 units Tab    vitamin E 100 UNIT capsule     No current facility-administered medications for this visit.       Objective:  Verena Toscano arrived promptly for the session.  Ms. Toscano was independently ambulatory at the time of session. The patient was fully cooperative throughout the session.  Appearance: age appropriate, casually  dressed, well groomed  Behavior/Cooperation: friendly and cooperative  Speech: normal in rate, volume, and tone and appropriate quality, quantity and organization of sentences  Mood: anxious  Affect: tearful  Thought Process: goal-directed, logical  Thought Content: normal,  No delusions or paranoia; did not appear to be responding to internal stimuli during the session  Orientation: grossly intact  Memory: Grossly intact  Attention Span/Concentration: Attends to session without  distraction; reports no difficulty  Fund of Knowledge: average  Estimate of Intelligence: average from verbal skills and history  Cognition: grossly intact  Insight: patient has awareness of illness; good insight into own behavior and behavior of others  Judgment: the patient's behavior is adequate to circumstances    Interval history and content of current session: Patient discussed events and activities since the time of last visit.  Patient reports having had a mixed time in [place] (some conflict with daughter). Discussed future assertive communication and problem-solving with Katalina related to travel and aging. Decreased distress due to son's diagnosis yesterday with DM.       Prior: discussion of sexual changes: Enjoyable and fulfilling sex life prior to 's illness. Historical use of Viagra, Cialis. 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 Since Padcev, his ability to have an erection (or ejaculate without an erection) is almost gone. She feels his interest is low (although they still cuddle and show other physical affection). She wonders about his thinking about their change in interaction and whether he is interested in other potential assistive devices for intercourse. (Her drive has not changed, is comfortable masturbating, unsure how he thinks about her sexual interest).     Risk parameters:   Patient reports no suicidal ideation  Patient reports no homicidal ideation  Patient reports no self-injurious behavior  Patient reports no violent behavior   Safety needs:  None at this time      Verbal deficits: None     Patient's response to intervention:The patient's response to intervention is accepting, motivated.     Progress toward goals: Progress as Expected        Patient reported outcomes:      Distress thermometer:   DISTRESS SCREENING 7/12/2023 6/15/2023 5/31/2023 4/27/2023 4/13/2023 3/22/2023 3/1/2023   Distress  Score 1 3 3 1 7 3 1   Practical Problems Insurance/Financial - Insurance/Financial;None of these Insurance/Financial Insurance/Financial Insurance/Financial Insurance/Financial   Family Problems Dealing with Children - None of these None of these Dealing with Partner Dealing with Children;Dealing with Partner Dealing with Children;Dealing with Partner   Emotional Problems Fears;Worry - Fears;Nervousness;Worry Sadness Fears;Sadness;Worry Fears;Nervousness;Worry Sadness;Worry   Spiritual / Cheondoism Concerns No - No No No No No   Physical Problems Pain - Pain Pain Pain Pain Fatigue   Other Problems - - - - - - Pain           PHQ-9=2PHQ-9 Total Score: 2 (07/12/23 0918) ; Initial visit: 7       DEANNA-7=7; Initial visit:6   GAD7 7/12/2023 5/31/2023 4/27/2023   1. Feeling nervous, anxious, or on edge? 1 1 0   2. Not being able to stop or control worrying? 0 1 0   3. Worrying too much about different things? 0 1 0   4. Trouble relaxing? 0 1 0   5. Being so restless that it is hard to sit still? 0 0 0   6. Becoming easily annoyed or irritable? 0 1 0   7. Feeling afraid as if something awful might happen? 1 1 0   DEANNA-7 Score 2 6 0          Client Strengths: verbal, intelligent, successful, good social support, good insight, commitment to wellness      Treatment Plan:individual psychotherapy  Target symptoms: adjustment  Why chosen therapy is appropriate versus another modality: relevant to diagnosis, patient responds to this modality, evidence based practice  Outcome monitoring methods: self-report, checklist/rating scale  Therapeutic intervention type: behavior modifying psychotherapy  Prognosis: Good    Goals from last visit: Met   Behavioral goals prior to next visit:    Exercise: back to [employer]   Stress management:    Social engagement:     Nutrition: return to regular eating pattern   Smoking Cessation:   Therapy:         Return to clinic: 2 weeks     Length of Service (minutes direct face-to-face contact):  60    Diagnosis:     ICD-10-CM ICD-9-CM   1. Adjustment disorder with anxious mood  F43.22 309.24             Xavier Núñez, PhD  [place] License #[id number]  [place] License #[id number]

## 2023-07-28 ENCOUNTER — OFFICE VISIT (OUTPATIENT)
Dept: URGENT CARE | Facility: CLINIC | Age: 68
End: 2023-07-28
Payer: MEDICARE

## 2023-07-28 VITALS
DIASTOLIC BLOOD PRESSURE: 73 MMHG | HEIGHT: 66 IN | BODY MASS INDEX: 31.5 KG/M2 | TEMPERATURE: 98 F | HEART RATE: 70 BPM | RESPIRATION RATE: 19 BRPM | OXYGEN SATURATION: 98 % | WEIGHT: 196 LBS | SYSTOLIC BLOOD PRESSURE: 119 MMHG

## 2023-07-28 DIAGNOSIS — M16.0 OSTEOARTHRITIS OF BOTH HIPS, UNSPECIFIED OSTEOARTHRITIS TYPE: ICD-10-CM

## 2023-07-28 DIAGNOSIS — M25.551 RIGHT HIP PAIN: Primary | ICD-10-CM

## 2023-07-28 PROBLEM — Z79.890 HORMONE REPLACEMENT THERAPY: Status: ACTIVE | Noted: 2022-11-16

## 2023-07-28 PROBLEM — R42 CERVICAL VERTIGO: Status: ACTIVE | Noted: 2023-07-28

## 2023-07-28 PROBLEM — N76.2 VULVITIS: Status: ACTIVE | Noted: 2022-11-16

## 2023-07-28 PROBLEM — E78.5 DYSLIPIDEMIA: Status: ACTIVE | Noted: 2022-12-15

## 2023-07-28 PROBLEM — I10 ESSENTIAL HYPERTENSION: Status: ACTIVE | Noted: 2022-12-15

## 2023-07-28 PROBLEM — E55.9 VITAMIN D DEFICIENCY: Status: ACTIVE | Noted: 2022-11-16

## 2023-07-28 PROBLEM — M54.2 NECK PAIN: Status: ACTIVE | Noted: 2023-07-28

## 2023-07-28 PROBLEM — L30.4 INTERTRIGO: Status: ACTIVE | Noted: 2022-11-16

## 2023-07-28 PROCEDURE — 99213 OFFICE O/P EST LOW 20 MIN: CPT | Mod: S$GLB,,, | Performed by: NURSE PRACTITIONER

## 2023-07-28 PROCEDURE — 99213 PR OFFICE/OUTPT VISIT, EST, LEVL III, 20-29 MIN: ICD-10-PCS | Mod: S$GLB,,, | Performed by: NURSE PRACTITIONER

## 2023-07-28 PROCEDURE — 73502 X-RAY EXAM HIP UNI 2-3 VIEWS: CPT | Mod: RT,S$GLB,, | Performed by: RADIOLOGY

## 2023-07-28 PROCEDURE — 73502 XR HIP WITH PELVIS WHEN PERFORMED, 2 OR 3  VIEWS RIGHT: ICD-10-PCS | Mod: RT,S$GLB,, | Performed by: RADIOLOGY

## 2023-07-28 RX ORDER — NAPROXEN 500 MG/1
500 TABLET ORAL 2 TIMES DAILY PRN
COMMUNITY
Start: 2023-06-03

## 2023-07-28 RX ORDER — METHOCARBAMOL 750 MG/1
TABLET, FILM COATED ORAL
COMMUNITY

## 2023-07-28 RX ORDER — HYDROCHLOROTHIAZIDE 25 MG/1
1 TABLET ORAL DAILY
COMMUNITY

## 2023-07-28 RX ORDER — ECONAZOLE NITRATE 10 MG/G
CREAM TOPICAL
COMMUNITY

## 2023-07-28 RX ORDER — MELOXICAM 15 MG/1
TABLET ORAL
COMMUNITY

## 2023-07-28 RX ORDER — NAPROXEN 500 MG/1
500 TABLET ORAL 2 TIMES DAILY PRN
COMMUNITY
Start: 2022-12-05

## 2023-07-28 RX ORDER — NYSTATIN 100000 U/G
CREAM TOPICAL
COMMUNITY
Start: 2022-11-16

## 2023-07-28 RX ORDER — METHENAMINE, SODIUM PHOSPHATE, MONOBASIC, MONOHYDRATE, PHENYL SALICYLATE, METHYLENE BLUE, AND HYOSCYAMINE SULFATE 118; 40.8; 36; 10; .12 MG/1; MG/1; MG/1; MG/1; MG/1
CAPSULE ORAL
COMMUNITY

## 2023-07-28 RX ORDER — FLUCONAZOLE 100 MG/1
TABLET ORAL
COMMUNITY

## 2023-07-28 NOTE — PATIENT INSTRUCTIONS
Please offer appointment as held 1/15, thank you  You may want to take medicines like ibuprofen or naproxen for swelling and pain. These are nonsteroidal anti-inflammatory drugs (NSAIDs).  Place an ice pack or a bag of frozen vegetables wrapped in a towel over the painful part. Never put ice right on the skin. Do not leave the ice on more than 10 to 15 minutes at a time. Use for the first 24 to 48 hours after an injury.  Once your pain decreases, talk with your regular doctor or a physical therapist to find out if there are exercises or stretches that may help with your problem  Please drink plenty of fluids.  Please get plenty of rest.  Please return here or go to the Emergency Department for any concerns or worsening of condition.  You were prescribed an anti-inflammatory medication. Do not take these medications on an empty stomach.    Please follow up with your primary care doctor or specialist as needed.    If you  smoke, please stop smoking.

## 2023-07-28 NOTE — PROGRESS NOTES
"Subjective:      Patient ID: Verena Toscano is a 68 y.o. female.    Vitals:  height is 5' 6" (1.676 m) and weight is 88.9 kg (196 lb). Her temperature is 98 °F (36.7 °C). Her blood pressure is 119/73 and her pulse is 70. Her respiration is 19 and oxygen saturation is 98%.     Chief Complaint: Hip Pain (RT )    68-year-old female presents to clinic with complaints of right hip pain. She reports falls x 5 on right hip last in Jan 2023, xray's negative for fracture or dislocation. She is concerned she could have a fracture without fall due to her history of laminectomy, shifting hips with a bulge on her right outer hip questionable bursitis and knowledge of possible hip fracture without injury. She is currently taking naproxen 500 mg 1-2 times daily as needed for pain last filled #60 6/3/23 . She has an appointment scheduled for physical therapy     Hip Pain   The incident occurred 2 days ago. The incident occurred at home. The injury mechanism is unknown. The pain is present in the right hip. The quality of the pain is described as stabbing. The pain is at a severity of 6/10. The pain is mild. The pain has been Intermittent since onset. Pertinent negatives include no inability to bear weight, loss of motion, loss of sensation, numbness or tingling. She reports no foreign bodies present. The symptoms are aggravated by movement. She has tried NSAIDs for the symptoms. The treatment provided mild relief.     Musculoskeletal:  Positive for pain, joint pain, abnormal ROM of joint, back pain and history of spine disorder. Negative for trauma and muscle ache.   Neurological:  Negative for numbness and tingling.    Objective:     Physical Exam   Constitutional: She is oriented to person, place, and time. She appears well-developed. She is cooperative. No distress.   HENT:   Head: Normocephalic and atraumatic.   Nose: Nose normal.   Mouth/Throat: Oropharynx is clear and moist and mucous membranes are normal.   Eyes: " Conjunctivae and lids are normal.   Neck: Trachea normal and phonation normal. Neck supple.   Cardiovascular: Normal rate.   Pulmonary/Chest: Effort normal.   Abdominal: Normal appearance.   Musculoskeletal:         General: No deformity.      Right hip: She exhibits tenderness and bony tenderness. She exhibits normal range of motion, normal strength, no crepitus, no deformity and no laceration.   Neurological: She is alert and oriented to person, place, and time. She has normal strength and normal reflexes. No sensory deficit.   Skin: Skin is warm, dry, intact and not diaphoretic.   Psychiatric: Her speech is normal and behavior is normal. Judgment and thought content normal.   Nursing note and vitals reviewed.    Assessment:     1. Right hip pain    2. Osteoarthritis of both hips, unspecified osteoarthritis type        Plan:       Right hip pain  -     X-Ray Hip 2 or 3 views Right (with Pelvis when performed); Future; Expected date: 07/28/2023    Osteoarthritis of both hips, unspecified osteoarthritis type      Reviewed abnormal xray with patient who acknowledged results.  Discussed  Diagnosis and treatment plan with patient who verbalized understanding and agrees with plan of care. Patient given educational handouts supporting this diagnosis as well. Patient denies any further questions or concerns at this time.  Patient exits exam room in no acute distress.           X-Ray Hip 2 or 3 views Right (with Pelvis when performed)    Result Date: 7/28/2023  EXAMINATION: XR HIP WITH PELVIS WHEN PERFORMED, 2 OR 3  VIEWS RIGHT CLINICAL HISTORY: Pain in right hip. TECHNIQUE: AP view of the pelvis and frog leg lateral view of the right hip were performed. COMPARISON: None. FINDINGS: No acute fracture or dislocation.  Degenerative changes in both hips.  No unexpected radiopaque foreign body.     No acute displaced fracture. Electronically signed by: Thien Ho MD Date:    07/28/2023 Time:    17:33      Patient  Instructions   You may want to take medicines like ibuprofen or naproxen for swelling and pain. These are nonsteroidal anti-inflammatory drugs (NSAIDs).  Place an ice pack or a bag of frozen vegetables wrapped in a towel over the painful part. Never put ice right on the skin. Do not leave the ice on more than 10 to 15 minutes at a time. Use for the first 24 to 48 hours after an injury.  Once your pain decreases, talk with your regular doctor or a physical therapist to find out if there are exercises or stretches that may help with your problem  Please drink plenty of fluids.  Please get plenty of rest.  Please return here or go to the Emergency Department for any concerns or worsening of condition.  You were prescribed an anti-inflammatory medication. Do not take these medications on an empty stomach.    Please follow up with your primary care doctor or specialist as needed.    If you  smoke, please stop smoking.

## 2023-08-02 ENCOUNTER — OFFICE VISIT (OUTPATIENT)
Dept: PSYCHIATRY | Facility: CLINIC | Age: 68
End: 2023-08-02
Payer: MEDICARE

## 2023-08-02 DIAGNOSIS — F43.22 ADJUSTMENT DISORDER WITH ANXIOUS MOOD: Primary | ICD-10-CM

## 2023-08-02 PROCEDURE — 90837 PSYTX W PT 60 MINUTES: CPT | Mod: ,,, | Performed by: PSYCHOLOGIST

## 2023-08-02 PROCEDURE — 99999 PR PBB SHADOW E&M-EST. PATIENT-LVL I: ICD-10-PCS | Mod: PBBFAC,,, | Performed by: PSYCHOLOGIST

## 2023-08-02 PROCEDURE — 90837 PR PSYCHOTHERAPY W/PATIENT, 60 MIN: ICD-10-PCS | Mod: ,,, | Performed by: PSYCHOLOGIST

## 2023-08-02 PROCEDURE — 99211 OFF/OP EST MAY X REQ PHY/QHP: CPT | Mod: PBBFAC | Performed by: PSYCHOLOGIST

## 2023-08-02 PROCEDURE — 99999 PR PBB SHADOW E&M-EST. PATIENT-LVL I: CPT | Mod: PBBFAC,,, | Performed by: PSYCHOLOGIST

## 2023-08-02 NOTE — PROGRESS NOTES
PSYCHO-ONCOLOGY NOTE/ Individual Psychotherapy       Date: 8/2/2023   Site of therapist:  [facility]          Therapeutic Intervention: Met with patient.  Outpatient - Behavior modifying psychotherapy 60 min - CPT code 46628  The patient's last visit with me was on 7/12/2023.    Problem list  Patient Active Problem List   Diagnosis    Cough    Cognitive complaints    Adjustment disorder with anxious mood    Cervical vertigo    Dyslipidemia    Essential hypertension    Hormone replacement therapy    Intertrigo    Neck pain    Vitamin D deficiency    Vulvitis       Chief complaint/reason for encounter: anxiety   Met with patient to evaluate psychosocial adaptation to caregiving    Current Medications  Current Outpatient Medications   Medication    acetaminophen (TYLENOL) 500 MG tablet    ammonium lactate 12 % Crea    econazole nitrate 1 % cream    estradiol (ESTRACE) 1 MG tablet    fluconazole (DIFLUCAN) 100 MG tablet    hydroCHLOROthiazide (HYDRODIURIL) 25 MG tablet    loratadine (CLARITIN) 10 mg tablet    meloxicam (MOBIC) 15 MG tablet    methen-m.blue-s.phos-phsal-hyo (URIBEL) 118-10-40.8-36 mg Cap    methocarbamoL (ROBAXIN) 750 MG Tab    multivitamin capsule    naproxen (NAPROSYN) 500 MG tablet    naproxen (NAPROSYN) 500 MG tablet    nystatin (MYCOSTATIN) cream    omeprazole (PRILOSEC) 10 MG capsule    triamterene-hydrochlorothiazide 37.5-25 mg (DYAZIDE) 37.5-25 mg per capsule     No current facility-administered medications for this visit.       Objective:  Verena Toscano arrived promptly for the session.  Ms. Toscano was independently ambulatory at the time of session. The patient was fully cooperative throughout the session.  Appearance: age appropriate, casually  dressed, well groomed  Behavior/Cooperation: friendly and cooperative  Speech: normal in rate, volume, and tone and appropriate quality, quantity and organization of sentences  Mood: euthymic  Affect: euthymic  Thought Process:  goal-directed, logical  Thought Content: normal,  No delusions or paranoia; did not appear to be responding to internal stimuli during the session  Orientation: grossly intact  Memory: Grossly intact  Attention Span/Concentration: Attends to session without distraction; reports no difficulty  Fund of Knowledge: average  Estimate of Intelligence: average from verbal skills and history  Cognition: grossly intact  Insight: patient has awareness of illness; good insight into own behavior and behavior of others  Judgment: the patient's behavior is adequate to circumstances    Interval history and content of current session: Patient discussed events and activities since the time of last visit.  Patient reports feeling proud of her coping with daughter's decision not to come to [facility]. Patient recognizes growth in her ability to resist accepting the financial burden for daughter's trip. She and her  will still take a trip ([employer]).  Patient able to resist catastrophising about her recent hip pain and 's altered sleep schedule.    Prior: discussion of sexual changes: Enjoyable and fulfilling sex life prior to 's illness. Historical use of Viagra, Cialis. 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 Since Padcev, his ability to have an erection (or ejaculate without an erection) is almost gone. She feels his interest is low (although they still cuddle and show other physical affection). She wonders about his thinking about their change in interaction and whether he is interested in other potential assistive devices for intercourse. (Her drive has not changed, is comfortable masturbating, unsure how he thinks about her sexual interest).     Risk parameters:   Patient reports no suicidal ideation  Patient reports no homicidal ideation  Patient reports no self-injurious behavior  Patient reports no violent  behavior   Safety needs:  None at this time      Verbal deficits: None     Patient's response to intervention:The patient's response to intervention is accepting, motivated.     Progress toward goals: Progress as Expected        Patient reported outcomes:      Distress thermometer:   DISTRESS SCREENING 8/2/2023 7/12/2023 6/15/2023 5/31/2023 4/27/2023 4/13/2023 3/22/2023   Distress Score 3 1 3 3 1 7 3   Practical Problems Insurance/Financial Insurance/Financial - Insurance/Financial;None of these Insurance/Financial Insurance/Financial Insurance/Financial   Family Problems Dealing with Children Dealing with Children - None of these None of these Dealing with Partner Dealing with Children;Dealing with Partner   Emotional Problems Worry Fears;Worry - Fears;Nervousness;Worry Sadness Fears;Sadness;Worry Fears;Nervousness;Worry   Spiritual / Church Concerns No No - No No No No   Physical Problems Pain Pain - Pain Pain Pain Pain   Other Problems - - - - - - -           PHQ-9=2PHQ-9 Total Score: 2 (08/02/23 0931) ; Initial visit: 7       DEANNA-7=7; Initial visit:6   GAD7 8/2/2023 7/12/2023 5/31/2023   1. Feeling nervous, anxious, or on edge? 1 1 1   2. Not being able to stop or control worrying? 0 0 1   3. Worrying too much about different things? 1 0 1   4. Trouble relaxing? 0 0 1   5. Being so restless that it is hard to sit still? 0 0 0   6. Becoming easily annoyed or irritable? 0 0 1   7. Feeling afraid as if something awful might happen? 1 1 1   DEANNA-7 Score 3 2 6          Client Strengths: verbal, intelligent, successful, good social support, good insight, commitment to wellness      Treatment Plan:individual psychotherapy  Target symptoms: adjustment  Why chosen therapy is appropriate versus another modality: relevant to diagnosis, patient responds to this modality, evidence based practice  Outcome monitoring methods: self-report, checklist/rating scale  Therapeutic intervention type: behavior modifying  psychotherapy  Prognosis: Good    Goals from last visit: Met   Behavioral goals prior to next visit:    Exercise: as per PT   Stress management: acceptance of others' behavior   Social engagement:     Nutrition:    Smoking Cessation:   Therapy:         Return to clinic: 2 weeks     Length of Service (minutes direct face-to-face contact): 60    Diagnosis:     ICD-10-CM ICD-9-CM   1. Adjustment disorder with anxious mood  F43.22 309.24             Xavier Núñez, PhD  [place] License #[id number]  [place] License #[id number]

## 2023-08-16 ENCOUNTER — OFFICE VISIT (OUTPATIENT)
Dept: PSYCHIATRY | Facility: CLINIC | Age: 68
End: 2023-08-16
Payer: MEDICARE

## 2023-08-16 DIAGNOSIS — F43.22 ADJUSTMENT DISORDER WITH ANXIOUS MOOD: Primary | ICD-10-CM

## 2023-08-16 PROCEDURE — 99999 PR PBB SHADOW E&M-EST. PATIENT-LVL II: CPT | Mod: PBBFAC,,, | Performed by: PSYCHOLOGIST

## 2023-08-16 PROCEDURE — 99212 OFFICE O/P EST SF 10 MIN: CPT | Mod: PBBFAC | Performed by: PSYCHOLOGIST

## 2023-08-16 PROCEDURE — 90834 PR PSYCHOTHERAPY W/PATIENT, 45 MIN: ICD-10-PCS | Mod: ,,, | Performed by: PSYCHOLOGIST

## 2023-08-16 PROCEDURE — 90834 PSYTX W PT 45 MINUTES: CPT | Mod: ,,, | Performed by: PSYCHOLOGIST

## 2023-08-16 PROCEDURE — 99999 PR PBB SHADOW E&M-EST. PATIENT-LVL II: ICD-10-PCS | Mod: PBBFAC,,, | Performed by: PSYCHOLOGIST

## 2023-08-17 NOTE — PROGRESS NOTES
PSYCHO-ONCOLOGY NOTE/ Individual Psychotherapy       Date: 8/16/2023   Site of therapist:  [facility]          Therapeutic Intervention: Met with patient.  Outpatient - Behavior modifying psychotherapy 45 min - CPT code 97023  The patient's last visit with me was on 8/2/2023.    Problem list  Patient Active Problem List   Diagnosis    Cough    Cognitive complaints    Adjustment disorder with anxious mood    Cervical vertigo    Dyslipidemia    Essential hypertension    Hormone replacement therapy    Intertrigo    Neck pain    Vitamin D deficiency    Vulvitis       Chief complaint/reason for encounter: anxiety   Met with patient to evaluate psychosocial adaptation to caregiving    Current Medications  Current Outpatient Medications   Medication    acetaminophen (TYLENOL) 500 MG tablet    ammonium lactate 12 % Crea    econazole nitrate 1 % cream    estradiol (ESTRACE) 1 MG tablet    fluconazole (DIFLUCAN) 100 MG tablet    hydroCHLOROthiazide (HYDRODIURIL) 25 MG tablet    loratadine (CLARITIN) 10 mg tablet    meloxicam (MOBIC) 15 MG tablet    methen-m.blue-s.phos-phsal-hyo (URIBEL) 118-10-40.8-36 mg Cap    methocarbamoL (ROBAXIN) 750 MG Tab    multivitamin capsule    naproxen (NAPROSYN) 500 MG tablet    naproxen (NAPROSYN) 500 MG tablet    nystatin (MYCOSTATIN) cream    omeprazole (PRILOSEC) 10 MG capsule    triamterene-hydrochlorothiazide 37.5-25 mg (DYAZIDE) 37.5-25 mg per capsule     No current facility-administered medications for this visit.       Objective:  Verena Toscano arrived promptly for the session.  Ms. Toscano was independently ambulatory at the time of session. The patient was fully cooperative throughout the session.  Appearance: age appropriate, casually  dressed, well groomed  Behavior/Cooperation: friendly and cooperative  Speech: normal in rate, volume, and tone and appropriate quality, quantity and organization of sentences  Mood: euthymic  Affect: sad  Thought Process:  "goal-directed, logical  Thought Content: normal,  No delusions or paranoia; did not appear to be responding to internal stimuli during the session  Orientation: grossly intact  Memory: Grossly intact  Attention Span/Concentration: Attends to session without distraction; reports no difficulty  Fund of Knowledge: average  Estimate of Intelligence: average from verbal skills and history  Cognition: grossly intact  Insight: patient has awareness of illness; good insight into own behavior and behavior of others  Judgment: the patient's behavior is adequate to circumstances    Interval history and content of current session: Patient discussed events and activities since the time of last visit.  Patient reports feeling sad about 's sexual dysfunction (see below). He has recently expressed interest in assistive devices. SHe has noticed increased sexual frustration lately and thoughts about sexual attraction to other men (not inconsistent with their openness about attraction throughout their marriage). Potential for discussion of sexual interest/participation discussed. ( does not have a urologist- He may pursue interest in a "[facility]").    Prior: discussion of sexual changes: Enjoyable and fulfilling sex life prior to 's illness. Historical use of Viagra, Cialis. 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 Since Padcev, his ability to have an erection (or ejaculate without an erection) is almost gone. She feels his interest is low (although they still cuddle and show other physical affection). She wonders about his thinking about their change in interaction and whether he is interested in other potential assistive devices for intercourse. (Her drive has not changed, is comfortable masturbating, unsure how he thinks about her sexual interest).     Risk parameters:   Patient reports no suicidal " ideation  Patient reports no homicidal ideation  Patient reports no self-injurious behavior  Patient reports no violent behavior   Safety needs:  None at this time      Verbal deficits: None     Patient's response to intervention:The patient's response to intervention is accepting, motivated.     Progress toward goals: Progress as Expected        Patient reported outcomes:      Distress thermometer:   DISTRESS SCREENING 8/16/2023 8/2/2023 7/12/2023 6/15/2023 5/31/2023 4/27/2023 4/13/2023   Distress Score 1 3 1 3 3 1 7   Practical Problems Insurance/Financial Insurance/Financial Insurance/Financial - Insurance/Financial;None of these Insurance/Financial Insurance/Financial   Family Problems Dealing with Children Dealing with Children Dealing with Children - None of these None of these Dealing with Partner   Emotional Problems Worry Worry Fears;Worry - Fears;Nervousness;Worry Sadness Fears;Sadness;Worry   Spiritual / Confucianism Concerns No No No - No No No   Physical Problems Pain Pain Pain - Pain Pain Pain   Other Problems - - - - - - -           PHQ-9=2PHQ-9 Total Score: 2 (08/16/23 0802) ; Initial visit: 7       DEANNA-7=7; Initial visit:6   GAD7 8/16/2023 8/2/2023 7/12/2023   1. Feeling nervous, anxious, or on edge? 1 1 1   2. Not being able to stop or control worrying? 0 0 0   3. Worrying too much about different things? 1 1 0   4. Trouble relaxing? 0 0 0   5. Being so restless that it is hard to sit still? 0 0 0   6. Becoming easily annoyed or irritable? 1 0 0   7. Feeling afraid as if something awful might happen? 0 1 1   DEANNA-7 Score 3 3 2          Client Strengths: verbal, intelligent, successful, good social support, good insight, commitment to wellness      Treatment Plan:individual psychotherapy  Target symptoms: adjustment  Why chosen therapy is appropriate versus another modality: relevant to diagnosis, patient responds to this modality, evidence based practice  Outcome monitoring methods: self-report,  checklist/rating scale  Therapeutic intervention type: behavior modifying psychotherapy  Prognosis: Good    Goals from last visit: Met   Behavioral goals prior to next visit:    Exercise: as per PT   Stress management: plan Julieta Willis NP   Social engagement:     Nutrition:    Smoking Cessation:   Therapy: avoid superstitious interpretation of thoughts        Return to clinic: 2 weeks     Length of Service (minutes direct face-to-face contact): 45    Diagnosis:     ICD-10-CM ICD-9-CM   1. Adjustment disorder with anxious mood  F43.22 309.24             Xavier Núñez, PhD  [place] License #[id number]  [place] License #[id number]

## 2023-08-18 ENCOUNTER — PATIENT MESSAGE (OUTPATIENT)
Dept: PSYCHIATRY | Facility: CLINIC | Age: 68
End: 2023-08-18
Payer: MEDICARE

## 2023-08-31 ENCOUNTER — OFFICE VISIT (OUTPATIENT)
Dept: PSYCHIATRY | Facility: CLINIC | Age: 68
End: 2023-08-31
Payer: MEDICARE

## 2023-08-31 DIAGNOSIS — F43.22 ADJUSTMENT DISORDER WITH ANXIOUS MOOD: Primary | ICD-10-CM

## 2023-08-31 PROCEDURE — 99999 PR PBB SHADOW E&M-EST. PATIENT-LVL II: CPT | Mod: PBBFAC,,, | Performed by: PSYCHOLOGIST

## 2023-08-31 PROCEDURE — 90834 PR PSYCHOTHERAPY W/PATIENT, 45 MIN: ICD-10-PCS | Mod: ,,, | Performed by: PSYCHOLOGIST

## 2023-08-31 PROCEDURE — 99212 OFFICE O/P EST SF 10 MIN: CPT | Mod: PBBFAC | Performed by: PSYCHOLOGIST

## 2023-08-31 PROCEDURE — 99999 PR PBB SHADOW E&M-EST. PATIENT-LVL II: ICD-10-PCS | Mod: PBBFAC,,, | Performed by: PSYCHOLOGIST

## 2023-08-31 PROCEDURE — 90834 PSYTX W PT 45 MINUTES: CPT | Mod: ,,, | Performed by: PSYCHOLOGIST

## 2023-08-31 NOTE — PROGRESS NOTES
PSYCHO-ONCOLOGY NOTE/ Individual Psychotherapy       Date: 8/31/2023   Site of therapist:  [facility]          Therapeutic Intervention: Met with patient.  Outpatient - Behavior modifying psychotherapy 45 min - CPT code 34785  The patient's last visit with me was on 8/16/2023.      Problem list  Patient Active Problem List   Diagnosis    Cough    Cognitive complaints    Adjustment disorder with anxious mood    Cervical vertigo    Dyslipidemia    Essential hypertension    Hormone replacement therapy    Intertrigo    Neck pain    Vitamin D deficiency    Vulvitis       Chief complaint/reason for encounter: anxiety   Met with patient to evaluate psychosocial adaptation to caregiving    Current Medications  Current Outpatient Medications   Medication    acetaminophen (TYLENOL) 500 MG tablet    ammonium lactate 12 % Crea    econazole nitrate 1 % cream    estradiol (ESTRACE) 1 MG tablet    fluconazole (DIFLUCAN) 100 MG tablet    hydroCHLOROthiazide (HYDRODIURIL) 25 MG tablet    loratadine (CLARITIN) 10 mg tablet    meloxicam (MOBIC) 15 MG tablet    methen-m.blue-s.phos-phsal-hyo (URIBEL) 118-10-40.8-36 mg Cap    methocarbamoL (ROBAXIN) 750 MG Tab    multivitamin capsule    naproxen (NAPROSYN) 500 MG tablet    naproxen (NAPROSYN) 500 MG tablet    nystatin (MYCOSTATIN) cream    omeprazole (PRILOSEC) 10 MG capsule    triamterene-hydrochlorothiazide 37.5-25 mg (DYAZIDE) 37.5-25 mg per capsule     No current facility-administered medications for this visit.       Objective:  Verena Toscano arrived promptly for the session.  Ms. Toscano was independently ambulatory at the time of session. The patient was fully cooperative throughout the session.  Appearance: age appropriate, casually  dressed, well groomed  Behavior/Cooperation: friendly and cooperative  Speech: normal in rate, volume, and tone and appropriate quality, quantity and organization of sentences  Mood: euthymic  Affect: euthymic  Thought Process:  "goal-directed, logical  Thought Content: normal,  No delusions or paranoia; did not appear to be responding to internal stimuli during the session  Orientation: grossly intact  Memory: Grossly intact  Attention Span/Concentration: Attends to session without distraction; reports no difficulty  Fund of Knowledge: average  Estimate of Intelligence: average from verbal skills and history  Cognition: grossly intact  Insight: patient has awareness of illness; good insight into own behavior and behavior of others  Judgment: the patient's behavior is adequate to circumstances    Interval history and content of current session: Patient discussed events and activities since the time of last visit.  Patient reports feeling happy about behavioral changes she is noticing in herself. She has been able to challenge distorted thinking about "fairness" of pain complaints. Discussed son's recent confirmation of Type 1 DM.  is planning for urology visit. Visit to [facility] in 2 weeks.       Prior: discussion of sexual changes: Enjoyable and fulfilling sex life prior to 's illness. Historical use of Viagra, Cialis. 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 Since Padcev, his ability to have an erection (or ejaculate without an erection) is almost gone. She feels his interest is low (although they still cuddle and show other physical affection). She wonders about his thinking about their change in interaction and whether he is interested in other potential assistive devices for intercourse. (Her drive has not changed, is comfortable masturbating, unsure how he thinks about her sexual interest).     Risk parameters:   Patient reports no suicidal ideation  Patient reports no homicidal ideation  Patient reports no self-injurious behavior  Patient reports no violent behavior   Safety needs:  None at this time      Verbal deficits: " None     Patient's response to intervention:The patient's response to intervention is accepting, motivated.     Progress toward goals: Progress as Expected        Patient reported outcomes:      Distress thermometer:       8/30/2023     9:57 AM 8/16/2023     8:00 AM 8/2/2023     9:28 AM 7/12/2023     9:16 AM 6/15/2023     2:12 PM 5/31/2023    12:38 PM 4/27/2023     1:50 PM   DISTRESS SCREENING   Distress Score 1 1 3 1 3 3 1   Practical Problems Insurance/Financial Insurance/Financial Insurance/Financial Insurance/Financial  Insurance/Financial;None of these Insurance/Financial   Family Problems Dealing with Children;Dealing with Partner Dealing with Children Dealing with Children Dealing with Children  None of these None of these   Emotional Problems Fears;Worry Worry Worry Fears;Worry  Fears;Nervousness;Worry Sadness   Spiritual / Nondenominational Concerns No No No No  No No   Physical Problems Pain Pain Pain Pain  Pain Pain           PHQ-9=2PHQ-9 Total Score: (P) 3 (08/30/23 0959) ; Initial visit: 7       DEANNA-7=7; Initial visit:6       8/30/2023     9:58 AM 8/16/2023     8:00 AM 8/2/2023     9:29 AM   GAD7   1. Feeling nervous, anxious, or on edge? 1 1 1   2. Not being able to stop or control worrying? 0 0 0   3. Worrying too much about different things? 0 1 1   4. Trouble relaxing? 0 0 0   5. Being so restless that it is hard to sit still? 0 0 0   6. Becoming easily annoyed or irritable? 0 1 0   7. Feeling afraid as if something awful might happen? 1 0 1   DEANNA-7 Score 2 3 3          Client Strengths: verbal, intelligent, successful, good social support, good insight, commitment to wellness      Treatment Plan:individual psychotherapy  Target symptoms: adjustment  Why chosen therapy is appropriate versus another modality: relevant to diagnosis, patient responds to this modality, evidence based practice  Outcome monitoring methods: self-report, checklist/rating scale  Therapeutic intervention type: behavior modifying  psychotherapy  Prognosis: Good    Goals from last visit: Met   Behavioral goals prior to next visit:    Exercise: as per PTJonathan   Stress management: plan Julieta Willis NP   Social engagement:     Nutrition:    Smoking Cessation:   Therapy: continue challenges to self-talk        Return to clinic: 2 weeks     Length of Service (minutes direct face-to-face contact): 45    Diagnosis:     ICD-10-CM ICD-9-CM   1. Adjustment disorder with anxious mood  F43.22 309.24             Xavier Núñez, PhD  [place] License #[id number]  [place] License #[id number]

## 2023-09-20 ENCOUNTER — OFFICE VISIT (OUTPATIENT)
Dept: PSYCHIATRY | Facility: CLINIC | Age: 68
End: 2023-09-20
Payer: MEDICARE

## 2023-09-20 DIAGNOSIS — F43.22 ADJUSTMENT DISORDER WITH ANXIOUS MOOD: Primary | ICD-10-CM

## 2023-09-20 PROCEDURE — 99212 OFFICE O/P EST SF 10 MIN: CPT | Mod: PBBFAC | Performed by: PSYCHOLOGIST

## 2023-09-20 PROCEDURE — 99999 PR PBB SHADOW E&M-EST. PATIENT-LVL II: CPT | Mod: PBBFAC,,, | Performed by: PSYCHOLOGIST

## 2023-09-20 PROCEDURE — 90834 PR PSYCHOTHERAPY W/PATIENT, 45 MIN: ICD-10-PCS | Mod: ,,, | Performed by: PSYCHOLOGIST

## 2023-09-20 PROCEDURE — 99999 PR PBB SHADOW E&M-EST. PATIENT-LVL II: ICD-10-PCS | Mod: PBBFAC,,, | Performed by: PSYCHOLOGIST

## 2023-09-20 PROCEDURE — 90834 PSYTX W PT 45 MINUTES: CPT | Mod: ,,, | Performed by: PSYCHOLOGIST

## 2023-09-20 NOTE — PROGRESS NOTES
PSYCHO-ONCOLOGY NOTE/ Individual Psychotherapy       Date: 9/20/2023   Site of therapist:  [facility]          Therapeutic Intervention: Met with patient.  Outpatient - Behavior modifying psychotherapy 45 min - CPT code 22606  The patient's last visit with me was on 8/31/2023.    Problem list  Patient Active Problem List   Diagnosis    Cough    Cognitive complaints    Adjustment disorder with anxious mood    Cervical vertigo    Dyslipidemia    Essential hypertension    Hormone replacement therapy    Intertrigo    Neck pain    Vitamin D deficiency    Vulvitis       Chief complaint/reason for encounter: anxiety   Met with patient to evaluate psychosocial adaptation to caregiving    Current Medications  Current Outpatient Medications   Medication    acetaminophen (TYLENOL) 500 MG tablet    ammonium lactate 12 % Crea    econazole nitrate 1 % cream    estradiol (ESTRACE) 1 MG tablet    fluconazole (DIFLUCAN) 100 MG tablet    hydroCHLOROthiazide (HYDRODIURIL) 25 MG tablet    loratadine (CLARITIN) 10 mg tablet    meloxicam (MOBIC) 15 MG tablet    methen-m.blue-s.phos-phsal-hyo (URIBEL) 118-10-40.8-36 mg Cap    methocarbamoL (ROBAXIN) 750 MG Tab    multivitamin capsule    naproxen (NAPROSYN) 500 MG tablet    naproxen (NAPROSYN) 500 MG tablet    nystatin (MYCOSTATIN) cream    omeprazole (PRILOSEC) 10 MG capsule    triamterene-hydrochlorothiazide 37.5-25 mg (DYAZIDE) 37.5-25 mg per capsule     No current facility-administered medications for this visit.       Objective:  Verena Toscano arrived promptly for the session.  Ms. Toscano was independently ambulatory at the time of session. The patient was fully cooperative throughout the session.  Appearance: age appropriate, casually  dressed, well groomed  Behavior/Cooperation: friendly and cooperative  Speech: normal in rate, volume, and tone and appropriate quality, quantity and organization of sentences  Mood: euthymic  Affect: euthymic  Thought Process:  goal-directed, logical  Thought Content: normal,  No delusions or paranoia; did not appear to be responding to internal stimuli during the session  Orientation: grossly intact  Memory: Grossly intact  Attention Span/Concentration: Attends to session without distraction; reports no difficulty  Fund of Knowledge: average  Estimate of Intelligence: average from verbal skills and history  Cognition: grossly intact  Insight: patient has awareness of illness; good insight into own behavior and behavior of others  Judgment: the patient's behavior is adequate to circumstances    Interval history and content of current session: Patient discussed events and activities since the time of last visit.  Patient reports having a great trip to [facility] and having several insights due to the trip (Nakul's improved mobility, need to allow him to eat as he prefers, she needs to take time from work).  Discussed her struggles to not nag her son about controlling his Type 1 DM. Financial responsibility by son is also a struggle.       Prior: discussion of sexual changes: Enjoyable and fulfilling sex life prior to 's illness. Historical use of Viagra, Cialis. 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 Since Padcev, his ability to have an erection (or ejaculate without an erection) is almost gone. She feels his interest is low (although they still cuddle and show other physical affection). She wonders about his thinking about their change in interaction and whether he is interested in other potential assistive devices for intercourse. (Her drive has not changed, is comfortable masturbating, unsure how he thinks about her sexual interest).     Risk parameters:   Patient reports no suicidal ideation  Patient reports no homicidal ideation  Patient reports no self-injurious behavior  Patient reports no violent behavior   Safety needs:  None at this  time      Verbal deficits: None     Patient's response to intervention:The patient's response to intervention is accepting, motivated.     Progress toward goals: Progress as Expected        Patient reported outcomes:      Distress thermometer:       9/20/2023     8:57 AM 8/30/2023     9:57 AM 8/16/2023     8:00 AM 8/2/2023     9:28 AM 7/12/2023     9:16 AM 6/15/2023     2:12 PM 5/31/2023    12:38 PM   DISTRESS SCREENING   Distress Score 1 1 1 3 1 3 3   Practical Problems Insurance/Financial Insurance/Financial Insurance/Financial Insurance/Financial Insurance/Financial  Insurance/Financial;None of these   Family Problems Dealing with Children Dealing with Children;Dealing with Partner Dealing with Children Dealing with Children Dealing with Children  None of these   Emotional Problems Fears;Worry Fears;Worry Worry Worry Fears;Worry  Fears;Nervousness;Worry   Spiritual / Baptist Concerns No No No No No  No   Physical Problems Pain Pain Pain Pain Pain  Pain           PHQ-9=2PHQ-9 Total Score: 1 (09/20/23 0859) ; Initial visit: 7       DEANNA-7=7; Initial visit:6       9/20/2023     8:58 AM 8/30/2023     9:58 AM 8/16/2023     8:00 AM   GAD7   1. Feeling nervous, anxious, or on edge? 1 1 1   2. Not being able to stop or control worrying? 1 0 0   3. Worrying too much about different things? 1 0 1   4. Trouble relaxing? 0 0 0   5. Being so restless that it is hard to sit still? 0 0 0   6. Becoming easily annoyed or irritable? 0 0 1   7. Feeling afraid as if something awful might happen? 1 1 0   DEANNA-7 Score 4 2 3          Client Strengths: verbal, intelligent, successful, good social support, good insight, commitment to wellness      Treatment Plan:individual psychotherapy  Target symptoms: adjustment  Why chosen therapy is appropriate versus another modality: relevant to diagnosis, patient responds to this modality, evidence based practice  Outcome monitoring methods: self-report, checklist/rating scale  Therapeutic  intervention type: behavior modifying psychotherapy  Prognosis: Good    Goals from last visit: Met   Behavioral goals prior to next visit:    Exercise: as per Jonathan THORPE   Stress management: take off time for [place] derek    Deadline to Curt on financial issues   Social engagement:  Disarming techniques prior to [place] trip   Nutrition: 1 month wt loss project with Nakul   Smoking Cessation:   Therapy: ask Curt what he understands about DM      Return to clinic: 2 weeks     Length of Service (minutes direct face-to-face contact): 45    Diagnosis:     ICD-10-CM ICD-9-CM   1. Adjustment disorder with anxious mood  F43.22 309.24             Xavier Núñez, PhD  [place] License #[id number]  [place] License #[id number]

## 2023-10-09 ENCOUNTER — OFFICE VISIT (OUTPATIENT)
Dept: PSYCHIATRY | Facility: CLINIC | Age: 68
End: 2023-10-09
Payer: MEDICARE

## 2023-10-09 DIAGNOSIS — F43.22 ADJUSTMENT DISORDER WITH ANXIOUS MOOD: Primary | ICD-10-CM

## 2023-10-09 PROCEDURE — 90834 PSYTX W PT 45 MINUTES: CPT | Mod: ,,, | Performed by: PSYCHOLOGIST

## 2023-10-09 PROCEDURE — 99212 OFFICE O/P EST SF 10 MIN: CPT | Mod: PBBFAC | Performed by: PSYCHOLOGIST

## 2023-10-09 PROCEDURE — 99999 PR PBB SHADOW E&M-EST. PATIENT-LVL II: ICD-10-PCS | Mod: PBBFAC,,, | Performed by: PSYCHOLOGIST

## 2023-10-09 PROCEDURE — 90834 PR PSYCHOTHERAPY W/PATIENT, 45 MIN: ICD-10-PCS | Mod: ,,, | Performed by: PSYCHOLOGIST

## 2023-10-09 PROCEDURE — 99999 PR PBB SHADOW E&M-EST. PATIENT-LVL II: CPT | Mod: PBBFAC,,, | Performed by: PSYCHOLOGIST

## 2023-10-09 NOTE — PROGRESS NOTES
PSYCHO-ONCOLOGY NOTE/ Individual Psychotherapy       Date: 10/9/2023   Site of therapist:  [facility]          Therapeutic Intervention: Met with patient.  Outpatient - Behavior modifying psychotherapy 45 min - CPT code 40022  The patient's last visit with me was on 9/20/2023.      Problem list  Patient Active Problem List   Diagnosis    Cough    Cognitive complaints    Adjustment disorder with anxious mood    Cervical vertigo    Dyslipidemia    Essential hypertension    Hormone replacement therapy    Intertrigo    Neck pain    Vitamin D deficiency    Vulvitis       Chief complaint/reason for encounter: anxiety   Met with patient to evaluate psychosocial adaptation to caregiving    Current Medications  Current Outpatient Medications   Medication    acetaminophen (TYLENOL) 500 MG tablet    ammonium lactate 12 % Crea    econazole nitrate 1 % cream    estradiol (ESTRACE) 1 MG tablet    fluconazole (DIFLUCAN) 100 MG tablet    hydroCHLOROthiazide (HYDRODIURIL) 25 MG tablet    loratadine (CLARITIN) 10 mg tablet    meloxicam (MOBIC) 15 MG tablet    methen-m.blue-s.phos-phsal-hyo (URIBEL) 118-10-40.8-36 mg Cap    methocarbamoL (ROBAXIN) 750 MG Tab    multivitamin capsule    naproxen (NAPROSYN) 500 MG tablet    naproxen (NAPROSYN) 500 MG tablet    nystatin (MYCOSTATIN) cream    omeprazole (PRILOSEC) 10 MG capsule    triamterene-hydrochlorothiazide 37.5-25 mg (DYAZIDE) 37.5-25 mg per capsule     No current facility-administered medications for this visit.       Objective:  Verena Toscano arrived promptly for the session.  Ms. Toscano was independently ambulatory at the time of session. The patient was fully cooperative throughout the session.  Appearance: age appropriate, casually  dressed, well groomed  Behavior/Cooperation: friendly and cooperative  Speech: normal in rate, volume, and tone and appropriate quality, quantity and organization of sentences  Mood: euthymic  Affect: euthymic  Thought Process:  goal-directed, logical  Thought Content: normal,  No delusions or paranoia; did not appear to be responding to internal stimuli during the session  Orientation: grossly intact  Memory: Grossly intact  Attention Span/Concentration: Attends to session without distraction; reports no difficulty  Fund of Knowledge: average  Estimate of Intelligence: average from verbal skills and history  Cognition: grossly intact  Insight: patient has awareness of illness; good insight into own behavior and behavior of others  Judgment: the patient's behavior is adequate to circumstances    Interval history and content of current session: Patient discussed events and activities since the time of last visit.  Patient reports planning for her trip to [place] this week. Giving financial responsibility to her son is also a struggle. She is learning to allow others around her to be a bit unhappy in order to further her own best interest.       Prior: discussion of sexual changes: Enjoyable and fulfilling sex life prior to 's illness. Historical use of Viagra, Cialis. 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 Since Padcev, his ability to have an erection (or ejaculate without an erection) is almost gone. She feels his interest is low (although they still cuddle and show other physical affection). She wonders about his thinking about their change in interaction and whether he is interested in other potential assistive devices for intercourse. (Her drive has not changed, is comfortable masturbating, unsure how he thinks about her sexual interest).     Risk parameters:   Patient reports no suicidal ideation  Patient reports no homicidal ideation  Patient reports no self-injurious behavior  Patient reports no violent behavior   Safety needs:  None at this time      Verbal deficits: None     Patient's response to intervention:The patient's response to  intervention is accepting, motivated.     Progress toward goals: Progress as Expected        Patient reported outcomes:      Distress thermometer:       10/9/2023    12:49 PM 9/20/2023     8:57 AM 8/30/2023     9:57 AM 8/16/2023     8:00 AM 8/2/2023     9:28 AM 7/12/2023     9:16 AM 6/15/2023     2:12 PM   DISTRESS SCREENING   Distress Score 1 1 1 1 3 1 3   Practical Problems Insurance/Financial Insurance/Financial Insurance/Financial Insurance/Financial Insurance/Financial Insurance/Financial    Family Problems Dealing with Children Dealing with Children Dealing with Children;Dealing with Partner Dealing with Children Dealing with Children Dealing with Children    Emotional Problems Worry Fears;Worry Fears;Worry Worry Worry Fears;Worry    Spiritual / Yarsani Concerns No No No No No No    Physical Problems Pain Pain Pain Pain Pain Pain            PHQ-9=PHQ-9 Total Score: 2 (10/09/23 1252) ; Initial visit: 7       DEANNA-7=7 Initial visit:6       10/9/2023    12:51 PM 9/20/2023     8:58 AM 8/30/2023     9:58 AM   GAD7   1. Feeling nervous, anxious, or on edge? 1 1 1   2. Not being able to stop or control worrying? 1 1 0   3. Worrying too much about different things? 1 1 0   4. Trouble relaxing? 0 0 0   5. Being so restless that it is hard to sit still? 0 0 0   6. Becoming easily annoyed or irritable? 0 0 0   7. Feeling afraid as if something awful might happen? 1 1 1   DEANNA-7 Score 4 4 2          Client Strengths: verbal, intelligent, successful, good social support, good insight, commitment to wellness      Treatment Plan:individual psychotherapy  Target symptoms: adjustment  Why chosen therapy is appropriate versus another modality: relevant to diagnosis, patient responds to this modality, evidence based practice  Outcome monitoring methods: self-report, checklist/rating scale  Therapeutic intervention type: behavior modifying psychotherapy  Prognosis: Good    Goals from last visit: Met   Behavioral goals prior to  next visit:    Exercise: as per PT, Jonathan   Stress management: take off time for [place] derek    Deadline to Curt on financial issues   Social engagement:  Disarming techniques prior to [place] trip   Nutrition: 1 month wt loss project with Nakul   Smoking Cessation:   Therapy: ask Curt what he understands about DM      Return to clinic: 2 weeks     Length of Service (minutes direct face-to-face contact): 45    Diagnosis:     ICD-10-CM ICD-9-CM   1. Adjustment disorder with anxious mood  F43.22 309.24             Xavier Núñez, PhD  [place] License #[id number]  [place] License #[id number]

## 2023-10-26 ENCOUNTER — TELEPHONE (OUTPATIENT)
Dept: INFUSION THERAPY | Facility: HOSPITAL | Age: 68
End: 2023-10-26
Payer: MEDICARE

## 2023-11-02 ENCOUNTER — OFFICE VISIT (OUTPATIENT)
Dept: PSYCHIATRY | Facility: CLINIC | Age: 68
End: 2023-11-02
Payer: MEDICARE

## 2023-11-02 DIAGNOSIS — F43.22 ADJUSTMENT DISORDER WITH ANXIOUS MOOD: Primary | ICD-10-CM

## 2023-11-02 PROCEDURE — 90834 PR PSYCHOTHERAPY W/PATIENT, 45 MIN: ICD-10-PCS | Mod: ,,, | Performed by: PSYCHOLOGIST

## 2023-11-02 PROCEDURE — 90834 PSYTX W PT 45 MINUTES: CPT | Mod: ,,, | Performed by: PSYCHOLOGIST

## 2023-11-02 NOTE — PROGRESS NOTES
PSYCHO-ONCOLOGY NOTE/ Individual Psychotherapy       Date: 11/2/2023   Site of therapist:  [facility]          Therapeutic Intervention: Met with patient.  Outpatient - Behavior modifying psychotherapy 45 min - CPT code 28721  The patient's last visit with me was on 10/9/2023.    Problem list  Patient Active Problem List   Diagnosis    Cough    Cognitive complaints    Adjustment disorder with anxious mood    Cervical vertigo    Dyslipidemia    Essential hypertension    Hormone replacement therapy    Intertrigo    Neck pain    Vitamin D deficiency    Vulvitis       Chief complaint/reason for encounter: anxiety   Met with patient to evaluate psychosocial adaptation to caregiving    Current Medications  Current Outpatient Medications   Medication    acetaminophen (TYLENOL) 500 MG tablet    ammonium lactate 12 % Crea    econazole nitrate 1 % cream    estradiol (ESTRACE) 1 MG tablet    fluconazole (DIFLUCAN) 100 MG tablet    hydroCHLOROthiazide (HYDRODIURIL) 25 MG tablet    loratadine (CLARITIN) 10 mg tablet    meloxicam (MOBIC) 15 MG tablet    methen-m.blue-s.phos-phsal-hyo (URIBEL) 118-10-40.8-36 mg Cap    methocarbamoL (ROBAXIN) 750 MG Tab    multivitamin capsule    naproxen (NAPROSYN) 500 MG tablet    naproxen (NAPROSYN) 500 MG tablet    nystatin (MYCOSTATIN) cream    omeprazole (PRILOSEC) 10 MG capsule    triamterene-hydrochlorothiazide 37.5-25 mg (DYAZIDE) 37.5-25 mg per capsule     No current facility-administered medications for this visit.       Objective:  Verena Toscano arrived promptly for the session.  Ms. Toscano was independently ambulatory at the time of session. The patient was fully cooperative throughout the session.  Appearance: age appropriate, casually  dressed, well groomed  Behavior/Cooperation: friendly and cooperative  Speech: normal in rate, volume, and tone and appropriate quality, quantity and organization of sentences  Mood: dysthymic  Affect: dysthymic  Thought Process:  goal-directed, logical  Thought Content: normal,  No delusions or paranoia; did not appear to be responding to internal stimuli during the session  Orientation: grossly intact  Memory: Grossly intact  Attention Span/Concentration: Attends to session without distraction; reports no difficulty  Fund of Knowledge: average  Estimate of Intelligence: average from verbal skills and history  Cognition: grossly intact  Insight: patient has awareness of illness; good insight into own behavior and behavior of others  Judgment: the patient's behavior is adequate to circumstances    Interval history and content of current session: Patient discussed events and activities since the time of last visit.  Patient reports having had a great trip to [place] last week. The trip increased her awareness of her tendency to do for others at her expense (physically).    Giving financial responsibility to her son is also a struggle. She is learning to allow others around her to be a bit unhappy in order to further her own best interest. Changing thinking around interactions as zero-sum games      Prior: discussion of sexual changes: Enjoyable and fulfilling sex life prior to 's illness. Historical use of Viagra, Cialis. 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 Since Padcev, his ability to have an erection (or ejaculate without an erection) is almost gone. She feels his interest is low (although they still cuddle and show other physical affection). She wonders about his thinking about their change in interaction and whether he is interested in other potential assistive devices for intercourse. (Her drive has not changed, is comfortable masturbating, unsure how he thinks about her sexual interest).     Risk parameters:   Patient reports no suicidal ideation  Patient reports no homicidal ideation  Patient reports no self-injurious behavior  Patient  reports no violent behavior   Safety needs:  None at this time      Verbal deficits: None     Patient's response to intervention:The patient's response to intervention is accepting, motivated.     Progress toward goals: Progress as Expected        Patient reported outcomes:      Distress thermometer:       11/1/2023     5:34 PM 10/9/2023    12:49 PM 9/20/2023     8:57 AM 8/30/2023     9:57 AM 8/16/2023     8:00 AM 8/2/2023     9:28 AM 7/12/2023     9:16 AM   DISTRESS SCREENING   Distress Score 3 1 1 1 1 3 1   Practical Problems Insurance/Financial;Treatment Decisions Insurance/Financial Insurance/Financial Insurance/Financial Insurance/Financial Insurance/Financial Insurance/Financial   Family Problems Dealing with Children;Dealing with Partner Dealing with Children Dealing with Children Dealing with Children;Dealing with Partner Dealing with Children Dealing with Children Dealing with Children   Emotional Problems Fears;Sadness;Worry Worry Fears;Worry Fears;Worry Worry Worry Fears;Worry   Spiritual / Gnosticist Concerns No No No No No No No   Physical Problems Pain Pain Pain Pain Pain Pain Pain           PHQ-9=PHQ-9 Total Score: 2 (11/01/23 0892) ; Initial visit: 7       DEANNA-7=7 Initial visit:6       11/1/2023     5:34 PM 10/9/2023    12:51 PM 9/20/2023     8:58 AM   GAD7   1. Feeling nervous, anxious, or on edge? 1 1 1   2. Not being able to stop or control worrying? 0 1 1   3. Worrying too much about different things? 1 1 1   4. Trouble relaxing? 0 0 0   5. Being so restless that it is hard to sit still? 0 0 0   6. Becoming easily annoyed or irritable? 1 0 0   7. Feeling afraid as if something awful might happen? 1 1 1   DEANNA-7 Score 4 4 4          Client Strengths: verbal, intelligent, successful, good social support, good insight, commitment to wellness      Treatment Plan:individual psychotherapy  Target symptoms: adjustment  Why chosen therapy is appropriate versus another modality: relevant to diagnosis,  "patient responds to this modality, evidence based practice  Outcome monitoring methods: self-report, checklist/rating scale  Therapeutic intervention type: behavior modifying psychotherapy  Prognosis: Good    Goals from last visit: Met   Behavioral goals prior to next visit:    Exercise: as per Jonathan THORPE   Stress management:   Deadline to Curt on financial issues   Social engagement:    Nutrition: 1 month wt loss project with Nakul   Smoking Cessation:   Therapy: write out "desirable thoughts", "what I deserve"      Return to clinic: 2 weeks     Length of Service (minutes direct face-to-face contact): 45    Diagnosis:     ICD-10-CM ICD-9-CM   1. Adjustment disorder with anxious mood  F43.22 309.24             Xavier Núñez, PhD  [place] License #[id number]  [place] License #[id number]                                    "

## 2023-11-15 ENCOUNTER — OFFICE VISIT (OUTPATIENT)
Dept: PSYCHIATRY | Facility: CLINIC | Age: 68
End: 2023-11-15
Payer: MEDICARE

## 2023-11-15 DIAGNOSIS — F43.22 ADJUSTMENT DISORDER WITH ANXIOUS MOOD: Primary | ICD-10-CM

## 2023-11-15 PROCEDURE — 99999 PR PBB SHADOW E&M-EST. PATIENT-LVL II: ICD-10-PCS | Mod: PBBFAC,,, | Performed by: PSYCHOLOGIST

## 2023-11-15 PROCEDURE — 99212 OFFICE O/P EST SF 10 MIN: CPT | Mod: PBBFAC | Performed by: PSYCHOLOGIST

## 2023-11-15 PROCEDURE — 90837 PR PSYCHOTHERAPY W/PATIENT, 60 MIN: ICD-10-PCS | Mod: ,,, | Performed by: PSYCHOLOGIST

## 2023-11-15 PROCEDURE — 90837 PSYTX W PT 60 MINUTES: CPT | Mod: ,,, | Performed by: PSYCHOLOGIST

## 2023-11-15 PROCEDURE — 99999 PR PBB SHADOW E&M-EST. PATIENT-LVL II: CPT | Mod: PBBFAC,,, | Performed by: PSYCHOLOGIST

## 2023-11-15 NOTE — PROGRESS NOTES
PSYCHO-ONCOLOGY NOTE/ Individual Psychotherapy       Date: 11/15/2023   Site of therapist:  [facility]          Therapeutic Intervention: Met with patient.  Outpatient - Behavior modifying psychotherapy 60 min - CPT code 83755  The patient's last visit with me was on 11/2/2023.    Problem list  Patient Active Problem List   Diagnosis    Cough    Cognitive complaints    Adjustment disorder with anxious mood    Cervical vertigo    Dyslipidemia    Essential hypertension    Hormone replacement therapy    Intertrigo    Neck pain    Vitamin D deficiency    Vulvitis       Chief complaint/reason for encounter: anxiety   Met with patient to evaluate psychosocial adaptation to caregiving    Current Medications  Current Outpatient Medications   Medication    acetaminophen (TYLENOL) 500 MG tablet    ammonium lactate 12 % Crea    econazole nitrate 1 % cream    estradiol (ESTRACE) 1 MG tablet    fluconazole (DIFLUCAN) 100 MG tablet    hydroCHLOROthiazide (HYDRODIURIL) 25 MG tablet    loratadine (CLARITIN) 10 mg tablet    meloxicam (MOBIC) 15 MG tablet    methen-m.blue-s.phos-phsal-hyo (URIBEL) 118-10-40.8-36 mg Cap    methocarbamoL (ROBAXIN) 750 MG Tab    multivitamin capsule    naproxen (NAPROSYN) 500 MG tablet    naproxen (NAPROSYN) 500 MG tablet    nystatin (MYCOSTATIN) cream    omeprazole (PRILOSEC) 10 MG capsule    triamterene-hydrochlorothiazide 37.5-25 mg (DYAZIDE) 37.5-25 mg per capsule     No current facility-administered medications for this visit.       Objective:  Verena Toscano arrived promptly for the session.  Ms. Toscano was independently ambulatory at the time of session. The patient was fully cooperative throughout the session.  Appearance: age appropriate, casually  dressed, well groomed  Behavior/Cooperation: friendly and cooperative  Speech: normal in rate, volume, and tone and appropriate quality, quantity and organization of sentences  Mood: dysthymic  Affect: dysthymic  Thought Process:  goal-directed, logical  Thought Content: normal,  No delusions or paranoia; did not appear to be responding to internal stimuli during the session  Orientation: grossly intact  Memory: Grossly intact  Attention Span/Concentration: Attends to session without distraction; reports no difficulty  Fund of Knowledge: average  Estimate of Intelligence: average from verbal skills and history  Cognition: grossly intact  Insight: patient has awareness of illness; good insight into own behavior and behavior of others  Judgment: the patient's behavior is adequate to circumstances    Interval history and content of current session: Patient discussed events and activities since the time of last visit.  Patient reports significant improvement in the tone of her self-talk and her ability to not  herself. Areas of avoidance include talking to Curt about finances and DM, Katalina about wedding plans, and calling insurance agency that is charging them monthly for unclear reasons.    She reports some grief about loss of sex life (Nakul's experiment with Cialis has not been successful) and chronic back pain.    Giving financial responsibility to her son is also a struggle. She is learning to allow others around her to be a bit unhappy in order to further her own best interest. Changing thinking around interactions as zero-sum games      Prior: discussion of sexual changes: Enjoyable and fulfilling sex life prior to 's illness. Historical use of Viagra, Cialis. 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 Since Padcev, his ability to have an erection (or ejaculate without an erection) is almost gone. She feels his interest is low (although they still cuddle and show other physical affection). She wonders about his thinking about their change in interaction and whether he is interested in other potential assistive devices for intercourse. (Her drive has  not changed, is comfortable masturbating, unsure how he thinks about her sexual interest).     Risk parameters:   Patient reports no suicidal ideation  Patient reports no homicidal ideation  Patient reports no self-injurious behavior  Patient reports no violent behavior   Safety needs:  None at this time      Verbal deficits: None     Patient's response to intervention:The patient's response to intervention is accepting, motivated.     Progress toward goals: Progress as Expected        Patient reported outcomes:      Distress thermometer:       11/15/2023     1:10 PM 11/1/2023     5:34 PM 10/9/2023    12:49 PM 9/20/2023     8:57 AM 8/30/2023     9:57 AM 8/16/2023     8:00 AM 8/2/2023     9:28 AM   DISTRESS SCREENING   Distress Score 2 3 1 1 1 1 3   Practical Problems Insurance/Financial Insurance/Financial;Treatment Decisions Insurance/Financial Insurance/Financial Insurance/Financial Insurance/Financial Insurance/Financial   Family Problems Dealing with Children Dealing with Children;Dealing with Partner Dealing with Children Dealing with Children Dealing with Children;Dealing with Partner Dealing with Children Dealing with Children   Emotional Problems Fears;Worry Fears;Sadness;Worry Worry Fears;Worry Fears;Worry Worry Worry   Spiritual / Taoism Concerns No No No No No No No   Physical Problems Pain Pain Pain Pain Pain Pain Pain           PHQ-9=PHQ-9 Total Score: (P) 0 (11/15/23 1313) ; Initial visit: 7       DEANNA-7=7 Initial visit:6       11/15/2023     1:12 PM 11/1/2023     5:34 PM 10/9/2023    12:51 PM   GAD7   1. Feeling nervous, anxious, or on edge? 1 1 1   2. Not being able to stop or control worrying? 0 0 1   3. Worrying too much about different things? 1 1 1   4. Trouble relaxing? 0 0 0   5. Being so restless that it is hard to sit still? 0 0 0   6. Becoming easily annoyed or irritable? 0 1 0   7. Feeling afraid as if something awful might happen? 1 1 1   DEANNA-7 Score 3 4 4          Client Strengths:  verbal, intelligent, successful, good social support, good insight, commitment to wellness      Treatment Plan:individual psychotherapy  Target symptoms: adjustment  Why chosen therapy is appropriate versus another modality: relevant to diagnosis, patient responds to this modality, evidence based practice  Outcome monitoring methods: self-report, checklist/rating scale  Therapeutic intervention type: behavior modifying psychotherapy  Prognosis: Good    Goals from last visit: Met   Behavioral goals prior to next visit:    Exercise: as per PT, Jaayleenercise   Stress management:   Deadline to Curt on financial issues   Social engagement: neutral convos with Curt around DM and Katalina about wedding   Nutrition: 1 month wt loss project with Nakul   Smoking Cessation:   Therapy:       Return to clinic: 2 weeks     Length of Service (minutes direct face-to-face contact): 60    Diagnosis:     ICD-10-CM ICD-9-CM   1. Adjustment disorder with anxious mood  F43.22 309.24             Xavier Núñez, PhD  [place] License #[id number]  [place] License #[id number]

## 2023-12-07 ENCOUNTER — OFFICE VISIT (OUTPATIENT)
Dept: PSYCHIATRY | Facility: CLINIC | Age: 68
End: 2023-12-07
Payer: MEDICARE

## 2023-12-07 DIAGNOSIS — F43.22 ADJUSTMENT DISORDER WITH ANXIOUS MOOD: Primary | ICD-10-CM

## 2023-12-07 PROCEDURE — 90837 PR PSYCHOTHERAPY W/PATIENT, 60 MIN: ICD-10-PCS | Mod: ,,, | Performed by: PSYCHOLOGIST

## 2023-12-07 PROCEDURE — 99999 PR PBB SHADOW E&M-EST. PATIENT-LVL II: CPT | Mod: PBBFAC,,, | Performed by: PSYCHOLOGIST

## 2023-12-07 PROCEDURE — 99212 OFFICE O/P EST SF 10 MIN: CPT | Mod: PBBFAC | Performed by: PSYCHOLOGIST

## 2023-12-07 PROCEDURE — 90837 PSYTX W PT 60 MINUTES: CPT | Mod: ,,, | Performed by: PSYCHOLOGIST

## 2023-12-07 PROCEDURE — 99999 PR PBB SHADOW E&M-EST. PATIENT-LVL II: ICD-10-PCS | Mod: PBBFAC,,, | Performed by: PSYCHOLOGIST
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
## 2023-12-14 ENCOUNTER — PATIENT MESSAGE (OUTPATIENT)
Dept: PSYCHIATRY | Facility: CLINIC | Age: 68
End: 2023-12-14
Payer: MEDICARE

## 2023-12-18 ENCOUNTER — OFFICE VISIT (OUTPATIENT)
Dept: URGENT CARE | Facility: CLINIC | Age: 68
End: 2023-12-18
Payer: MEDICARE

## 2023-12-18 VITALS
OXYGEN SATURATION: 100 % | SYSTOLIC BLOOD PRESSURE: 130 MMHG | DIASTOLIC BLOOD PRESSURE: 66 MMHG | TEMPERATURE: 98 F | BODY MASS INDEX: 31.5 KG/M2 | HEIGHT: 66 IN | RESPIRATION RATE: 16 BRPM | WEIGHT: 196 LBS | HEART RATE: 74 BPM

## 2023-12-18 DIAGNOSIS — S29.9XXA RIB INJURY: ICD-10-CM

## 2023-12-18 DIAGNOSIS — S59.901A INJURY OF RIGHT ELBOW, INITIAL ENCOUNTER: Primary | ICD-10-CM

## 2023-12-18 DIAGNOSIS — I10 HYPERTENSION, UNSPECIFIED TYPE: ICD-10-CM

## 2023-12-18 PROCEDURE — 73080 X-RAY EXAM OF ELBOW: CPT | Mod: RT,S$GLB,, | Performed by: RADIOLOGY

## 2023-12-18 PROCEDURE — 71100 X-RAY EXAM RIBS UNI 2 VIEWS: CPT | Mod: RT,S$GLB,, | Performed by: RADIOLOGY

## 2023-12-18 PROCEDURE — 99213 PR OFFICE/OUTPT VISIT, EST, LEVL III, 20-29 MIN: ICD-10-PCS | Mod: S$GLB,,,

## 2023-12-18 PROCEDURE — 71100 XR RIBS 2 VIEW RIGHT: ICD-10-PCS | Mod: RT,S$GLB,, | Performed by: RADIOLOGY

## 2023-12-18 PROCEDURE — 99213 OFFICE O/P EST LOW 20 MIN: CPT | Mod: S$GLB,,,

## 2023-12-18 PROCEDURE — 73080 XR ELBOW COMPLETE 3 VIEW RIGHT: ICD-10-PCS | Mod: RT,S$GLB,, | Performed by: RADIOLOGY

## 2023-12-18 RX ORDER — ACETAMINOPHEN 500 MG
1000 TABLET ORAL
Status: COMPLETED | OUTPATIENT
Start: 2023-12-18 | End: 2023-12-18

## 2023-12-18 RX ADMIN — Medication 1000 MG: at 09:12

## 2023-12-18 NOTE — PROGRESS NOTES
"Subjective:      Patient ID: Verena Toscano is a 68 y.o. female.    Vitals:  height is 5' 6" (1.676 m) and weight is 88.9 kg (195 lb 15.8 oz). Her oral temperature is 97.9 °F (36.6 °C). Her blood pressure is 130/66 and her pulse is 74. Her respiration is 16 and oxygen saturation is 100%.     Chief Complaint: Rib Injury and Elbow Injury    Pt reports that she had a trip and fall last night on concrete from one step. Pt has been having pain on her right ribs and right elbow. Pt reports that she noticed that her right side is bruised and hurts when she takes deep breaths. Pt reports a sharp pain when her elbow flex. Pt put ice on her elbow and took naproxen for the pain. Denies LOC, blood thinner use, vision changes, vomiting.       Constitution: Negative for chills, fatigue and fever.   Genitourinary:  Negative for dysuria, frequency and urgency.   Musculoskeletal:  Positive for pain, trauma, joint swelling and back pain. Negative for abnormal ROM of joint.   Skin:  Positive for abrasion.   Neurological:  Negative for dizziness, light-headedness, loss of balance, headaches, altered mental status, loss of consciousness, numbness and tingling.   Psychiatric/Behavioral:  Negative for altered mental status.       Objective:     Physical Exam   Constitutional: She is oriented to person, place, and time. She appears well-developed. She is cooperative. No distress.   HENT:   Head: Normocephalic and atraumatic.   Nose: Nose normal.   Mouth/Throat: Oropharynx is clear and moist and mucous membranes are normal.   Eyes: Conjunctivae and lids are normal.   Neck: Trachea normal and phonation normal. Neck supple.   Cardiovascular: Normal rate, regular rhythm, normal heart sounds and normal pulses.   Pulmonary/Chest: Effort normal and breath sounds normal.   Abdominal: Normal appearance and bowel sounds are normal. She exhibits no mass. Soft.   Musculoskeletal:         General: No deformity.      Right elbow: She exhibits " decreased range of motion and swelling. She exhibits no deformity. Tenderness found.      Cervical back: She exhibits no tenderness.      Thoracic back: She exhibits tenderness (right sided). She exhibits no bony tenderness.      Lumbar back: She exhibits no tenderness.   Neurological: She is alert and oriented to person, place, and time. She has normal strength and normal reflexes. No sensory deficit.   Skin: Skin is warm, dry and not diaphoretic. abrasion ((skin tears) right elbow, right upper arm, right hand. No bleeding.)   Psychiatric: Her speech is normal and behavior is normal. Judgment and thought content normal.   Nursing note and vitals reviewed.      Assessment:     1. Injury of right elbow, initial encounter    2. Rib injury    3. Hypertension, unspecified type        Plan:       Injury of right elbow, initial encounter  -     XR ELBOW COMPLETE 3 VIEW RIGHT; Future; Expected date: 12/18/2023  -     acetaminophen tablet 1,000 mg  -     BANDAGE ELASTIC 4IN ACE NS    Rib injury  -     X-Ray Ribs 2 View Right; Future; Expected date: 12/18/2023  -     acetaminophen tablet 1,000 mg    Hypertension, unspecified type      X-rays negative for fracture. RICE encouraged. She has naproxen at home. Add tylenol if needed. Bp initial elevated but then normalized. DASH diet and monitor at home. F/u with PCP in 1 week. Strict ED precautions.         Discussed results/diagnosis/plan with patient in clinic. Strict precautions given to patient to monitor for worsening signs and symptoms. Advised to follow up with PCP or specialist.  Explained side effects of medications prescribed with patient and informed him/her to discontinue use if he/she has any side effects and to inform UC or PCP if this occurs. All questions answered. Strict ED verses clinic return precautions stressed and given in depth. Advised if symptoms worsens of fail to improve he/she should go to the Emergency Room. Discharge and follow-up instructions given  verbally/printed with the patient who expressed understanding and willingness to comply with my recommendations. Patient voiced understanding and in agreement with current treatment plan. Patient exits the exam room in no acute distress. Conversant and engaged during discharge discussion, verbalized understanding.

## 2023-12-18 NOTE — PATIENT INSTRUCTIONS
Apply a compressive ACE bandage to your right elbow. Rest and elevate the affected painful area.  Apply cold compresses intermittently as needed.  As pain recedes, begin normal activities slowly as tolerated.      Alternate tylenol and ibuprofen every 4 hours as needed for pain. Always take ibuprofen with food and water to avoid GI upset. Stop taking if you develop abdominal pain or blood in stool.     Increase water intake.     Follow up with PCP for continued pain.     Go to the ER if you have shortness of breath, palpitations, dizziness, severe headache.       Elevated BP  Check your BP twice per day and keep a log.  Continue blood pressure medications as directed and do not miss doses.     Try a DASH diet. I have attached information in your discharge paperwork to review. A Mediterranean diet will help to decrease inflammation and would help lower your BP. Increase water intake to help your body get rid of an increase in salt. Avoid alcohol as this can increase your BP. Increase aerobic activity to 3 times per week and 45 min at a time. Practice good sleep hygiene by sleeping in a dark, cool, comfortable bed. No tv/device use before bed or tv while sleeping. It is recommended to get 7-8 hours of uninterrupted sleep per night.     The recommended daily fluid intake for women is 2.7 liters (five 16 oz bottles).     Go to the ER if your BP is consistently >160/100 and symptomatic with a severe headache, vomiting and unable to tolerate fluids, numbness/tingling/weakness to body, difficulty speaking, balance difficulty, confusion.          ([facility] recommendation)  The Mediterranean diet is based on plant-based foods and healthy fats.     You eat LOTS of vegetables, fruits, beans, lentils, nuts, whole grains (wheat bread, brown rice) & extra virgin olive oil.   ---- These foods are high in fiber and antioxidants.   ---- These nutrients help reduce inflammation.   ---- Fiber helps regulate bowel movements.   ----  Antioxidants protect you against cancer.     Eat a moderate amount of fish (salmon, herring, mackerel, sardines, anchovies, halibut, rainbow trout, tuna)   ---- (fish are high in omega-3 fatty acids)    Eat a moderate amount of cheese and yogurt.    Eat little or no meat-- eat more poultry (baked chicken, grilled chicken, ground turkey)  ---- avoid red meat and pork (causes inflammation and linked to colon cancer)    Eat little or no sweats, sugary drinks or butter.     Limit your sodium intake. A diet high in salt can increase your blood pressure and predisposes you to a heart attack or stroke.     BENEFITS OF DIET  --- Lowers your risk of cardiovascular disease (heart attack, stroke) and many other diseases.   --- Supports a healthy body weight.   --- Supports a healthy blood sugar, blood pressure and cholesterol.   --- Lowers your risk of metabolic syndrome   --- Supports a heathy balance of good gut bacteria in your digestive system.   --- Lowers your risk for cancer.   --- Helps with brain function and slows decline as we age.   --- Helps you live longer.     Talk to your primary care doctor about a dietician referral for further guidance.

## 2023-12-20 ENCOUNTER — OFFICE VISIT (OUTPATIENT)
Dept: PSYCHIATRY | Facility: CLINIC | Age: 68
End: 2023-12-20
Payer: MEDICARE

## 2023-12-20 DIAGNOSIS — F43.22 ADJUSTMENT DISORDER WITH ANXIOUS MOOD: Primary | ICD-10-CM

## 2023-12-20 PROCEDURE — 99212 OFFICE O/P EST SF 10 MIN: CPT | Mod: PBBFAC | Performed by: PSYCHOLOGIST

## 2023-12-20 PROCEDURE — 90834 PR PSYCHOTHERAPY W/PATIENT, 45 MIN: ICD-10-PCS | Mod: ,,, | Performed by: PSYCHOLOGIST

## 2023-12-20 PROCEDURE — 99999 PR PBB SHADOW E&M-EST. PATIENT-LVL II: CPT | Mod: PBBFAC,,, | Performed by: PSYCHOLOGIST

## 2023-12-20 PROCEDURE — 90834 PSYTX W PT 45 MINUTES: CPT | Mod: ,,, | Performed by: PSYCHOLOGIST

## 2023-12-20 PROCEDURE — 99999 PR PBB SHADOW E&M-EST. PATIENT-LVL II: ICD-10-PCS | Mod: PBBFAC,,, | Performed by: PSYCHOLOGIST

## 2023-12-20 NOTE — PROGRESS NOTES
PSYCHO-ONCOLOGY NOTE/ Individual Psychotherapy       Date: 12/20/2023   Site of therapist:  [facility]          Therapeutic Intervention: Met with patient.  Outpatient - Behavior modifying psychotherapy 45min - CPT code 31504  The patient's last visit with me was on 12/7/2023.      Problem list  Patient Active Problem List   Diagnosis    Cough    Cognitive complaints    Adjustment disorder with anxious mood    Cervical vertigo    Dyslipidemia    Essential hypertension    Hormone replacement therapy    Intertrigo    Neck pain    Vitamin D deficiency    Vulvitis       Chief complaint/reason for encounter: anxiety   Met with patient to evaluate psychosocial adaptation to caregiving    Current Medications  Current Outpatient Medications   Medication    acetaminophen (TYLENOL) 500 MG tablet    ammonium lactate 12 % Crea    econazole nitrate 1 % cream    estradiol (ESTRACE) 1 MG tablet    fluconazole (DIFLUCAN) 100 MG tablet    hydroCHLOROthiazide (HYDRODIURIL) 25 MG tablet    loratadine (CLARITIN) 10 mg tablet    meloxicam (MOBIC) 15 MG tablet    methen-m.blue-s.phos-phsal-hyo (URIBEL) 118-10-40.8-36 mg Cap    methocarbamoL (ROBAXIN) 750 MG Tab    multivitamin capsule    naproxen (NAPROSYN) 500 MG tablet    naproxen (NAPROSYN) 500 MG tablet    nystatin (MYCOSTATIN) cream    omeprazole (PRILOSEC) 10 MG capsule    triamterene-hydrochlorothiazide 37.5-25 mg (DYAZIDE) 37.5-25 mg per capsule     No current facility-administered medications for this visit.       Objective:  Verena Toscano arrived promptly for the session.  Ms. Toscano was independently ambulatory at the time of session. The patient was fully cooperative throughout the session.  Appearance: age appropriate, casually  dressed, well groomed  Behavior/Cooperation: friendly and cooperative  Speech: normal in rate, volume, and tone and appropriate quality, quantity and organization of sentences  Mood: dysthymic  Affect: dysthymic  Thought Process:  goal-directed, logical  Thought Content: normal,  No delusions or paranoia; did not appear to be responding to internal stimuli during the session  Orientation: grossly intact  Memory: Grossly intact  Attention Span/Concentration: Attends to session without distraction; reports no difficulty  Fund of Knowledge: average  Estimate of Intelligence: average from verbal skills and history  Cognition: grossly intact  Insight: patient has awareness of illness; good insight into own behavior and behavior of others  Judgment: the patient's behavior is adequate to circumstances    Interval history and content of current session: Patient discussed events and activities since the time of last visit.  Patient reports challenges to appropriate self-talk /self-judgment related to her recent fall (down steps). Her immediate fear of being a burden to others led to apologies to others.    Allowing others (trung Nakul) to own their emotions/choices discussed.    Prior: discussion of sexual changes: Enjoyable and fulfilling sex life prior to 's illness. Historical use of Viagra, Cialis. 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 Since Padcev, his ability to have an erection (or ejaculate without an erection) is almost gone. She feels his interest is low (although they still cuddle and show other physical affection). She wonders about his thinking about their change in interaction and whether he is interested in other potential assistive devices for intercourse. (Her drive has not changed, is comfortable masturbating, unsure how he thinks about her sexual interest).     Risk parameters:   Patient reports no suicidal ideation  Patient reports no homicidal ideation  Patient reports no self-injurious behavior  Patient reports no violent behavior   Safety needs:  None at this time      Verbal deficits: None     Patient's response to intervention:The  patient's response to intervention is accepting, motivated.     Progress toward goals: Progress as Expected        Patient reported outcomes:      Distress thermometer:       12/20/2023    10:08 AM 12/7/2023     6:58 AM 11/15/2023     1:10 PM 11/1/2023     5:34 PM 10/9/2023    12:49 PM 9/20/2023     8:57 AM 8/30/2023     9:57 AM   DISTRESS SCREENING   Distress Score 4 5 2 3 1 1 1   Practical Problems Insurance/Financial Insurance/Financial Insurance/Financial Insurance/Financial;Treatment Decisions Insurance/Financial Insurance/Financial Insurance/Financial   Family Problems Dealing with Partner Dealing with Children Dealing with Children Dealing with Children;Dealing with Partner Dealing with Children Dealing with Children Dealing with Children;Dealing with Partner   Emotional Problems Nervousness;Worry Sadness;Worry Fears;Worry Fears;Sadness;Worry Worry Fears;Worry Fears;Worry   Spiritual / Lutheran Concerns No No No No No No No   Physical Problems Pain Pain Pain Pain Pain Pain Pain           PHQ-9=PHQ-9 Total Score: (P) 2 (12/20/23 1010) ; Initial visit: 7       DEANNA-7= Initial visit:6       12/20/2023    10:09 AM 12/7/2023     7:05 AM 11/15/2023     1:12 PM   GAD7   1. Feeling nervous, anxious, or on edge? 1 2 1   2. Not being able to stop or control worrying? 1 0 0   3. Worrying too much about different things? 1 1 1   4. Trouble relaxing? 1 0 0   5. Being so restless that it is hard to sit still? 0 0 0   6. Becoming easily annoyed or irritable? 1 0 0   7. Feeling afraid as if something awful might happen? 0 1 1   DEANNA-7 Score 5 4 3          Client Strengths: verbal, intelligent, successful, good social support, good insight, commitment to wellness      Treatment Plan:individual psychotherapy  Target symptoms: adjustment  Why chosen therapy is appropriate versus another modality: relevant to diagnosis, patient responds to this modality, evidence based practice  Outcome monitoring methods: self-report,  checklist/rating scale  Therapeutic intervention type: behavior modifying psychotherapy  Prognosis: Good    Goals from last visit: Met   Behavioral goals prior to next visit:    Exercise: as per Jonathan THORPE   Stress management:   Deadline to Curt on financial issues   Social engagement:    Nutrition:    Smoking Cessation:   Therapy: change self-talk around fall, burden      Return to clinic: 2 weeks     Length of Service (minutes direct face-to-face contact): 45    Diagnosis:     ICD-10-CM ICD-9-CM   1. Adjustment disorder with anxious mood  F43.22 309.24       Xavier Núñez, PhD  [place] License #[id number]  [place] License #[id number]

## 2024-01-06 ENCOUNTER — OFFICE VISIT (OUTPATIENT)
Dept: URGENT CARE | Facility: CLINIC | Age: 69
End: 2024-01-06
Payer: MEDICARE

## 2024-01-06 VITALS
HEIGHT: 66 IN | RESPIRATION RATE: 16 BRPM | OXYGEN SATURATION: 99 % | BODY MASS INDEX: 31.5 KG/M2 | DIASTOLIC BLOOD PRESSURE: 57 MMHG | SYSTOLIC BLOOD PRESSURE: 128 MMHG | HEART RATE: 63 BPM | WEIGHT: 196 LBS | TEMPERATURE: 98 F

## 2024-01-06 DIAGNOSIS — S01.81XA LACERATION OF FOREHEAD, INITIAL ENCOUNTER: ICD-10-CM

## 2024-01-06 DIAGNOSIS — S13.4XXA WHIPLASH INJURY TO NECK, INITIAL ENCOUNTER: ICD-10-CM
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
## 2024-01-06 PROCEDURE — 90715 TDAP VACCINE 7 YRS/> IM: CPT | Mod: S$GLB,,, | Performed by: FAMILY MEDICINE

## 2024-01-06 PROCEDURE — 12013 RPR F/E/E/N/L/M 2.6-5.0 CM: CPT | Mod: S$GLB,,, | Performed by: FAMILY MEDICINE

## 2024-01-06 PROCEDURE — 99213 OFFICE O/P EST LOW 20 MIN: CPT | Mod: 25,S$GLB,, | Performed by: FAMILY MEDICINE

## 2024-01-06 PROCEDURE — 90471 IMMUNIZATION ADMIN: CPT | Mod: S$GLB,,, | Performed by: FAMILY MEDICINE

## 2024-01-06 RX ORDER — TIZANIDINE 2 MG/1
2 TABLET ORAL EVERY 6 HOURS PRN
Qty: 20 TABLET | Refills: 0 | Status: SHIPPED | OUTPATIENT
Start: 2024-01-06 | End: 2024-01-16

## 2024-01-06 NOTE — PROCEDURES
Laceration Repair    Date/Time: 1/6/2024 1:30 PM    Performed by: Nevin Christianson MD  Authorized by: Nevin Christianson MD  Consent Done: Not Needed  Body area: head/neck  Location details: forehead  Laceration length: 4 cm  Foreign bodies: wood  Tendon involvement: none  Nerve involvement: none  Vascular damage: yes  Anesthesia: local infiltration    Anesthesia:  Local Anesthetic: lidocaine 2% without epinephrine and LET (lido,epi,tetracaine)  Anesthetic total: 1 mL    Patient sedated: no  Irrigation solution: saline  Irrigation method: syringe  Amount of cleaning: standard  Debridement: none  Degree of undermining: none  Skin closure: 6-0 Prolene  Approximation: close  Approximation difficulty: simple  Dressing: non-stick sterile dressing and adhesive bandage  Patient tolerance: Patient tolerated the procedure well with no immediate complications

## 2024-01-06 NOTE — PROGRESS NOTES
"Subjective:      Patient ID: Verena Toscano is a 68 y.o. female.    Vitals:  height is 5' 6" (1.676 m) and weight is 88.9 kg (195 lb 15.8 oz). Her oral temperature is 98.1 °F (36.7 °C). Her blood pressure is 128/57 (abnormal) and her pulse is 63. Her respiration is 16 and oxygen saturation is 99%.     Chief Complaint: Facial Laceration    Pt reports that she fell and hit her head on the door frame. Pt reports that she has been neck pain in the back of her neck. Pt reports that she only placed ice on the head. Pt last tetanus was in 2016      Laceration   The incident occurred 1 to 3 hours ago. The laceration is located on the Face. The laceration mechanism was a blunt object (door frame). The pain is at a severity of 5/10. The pain is mild. The pain has been Constant since onset. She reports no foreign bodies present. Tetanus status: 2016.       Skin:  Positive for laceration.      Objective:     Physical Exam   Constitutional: She is oriented to person, place, and time.   HENT:   Head: Normocephalic.   Ears:   Right Ear: External ear normal.   Left Ear: External ear normal.   Nose: Nose normal.   Mouth/Throat: Mucous membranes are moist.   Eyes: Conjunctivae are normal.   Cardiovascular: Normal rate.   Pulmonary/Chest: Effort normal.   Musculoskeletal: Normal range of motion.         General: Normal range of motion.   Neurological: no focal deficit. She is alert and oriented to person, place, and time.   Skin: Skin is dry.         Comments: 4 cm laceration on mid forehead, bruising edema tenderness   Psychiatric: Her behavior is normal.       Assessment:     1. Fall, initial encounter    2. Whiplash injury to neck, initial encounter    3. Laceration of forehead, initial encounter        Plan:       Fall, initial encounter  -     (In Office Administered) Tdap Vaccine  -     tiZANidine (ZANAFLEX) 2 MG tablet; Take 1 tablet (2 mg total) by mouth every 6 (six) hours as needed (muscle spasms).  Dispense: 20 tablet; " Refill: 0    Whiplash injury to neck, initial encounter  -     tiZANidine (ZANAFLEX) 2 MG tablet; Take 1 tablet (2 mg total) by mouth every 6 (six) hours as needed (muscle spasms).  Dispense: 20 tablet; Refill: 0    Laceration of forehead, initial encounter  -     (In Office Administered) Tdap Vaccine  -     Laceration Repair      Rtc 7 days for suture removal  Tdap today (2016)  Tizanidine low dose for post neck muscle pain from fall  Fall precautions, known issue, rec she follow up with psychologist for testing

## 2024-01-11 ENCOUNTER — OFFICE VISIT (OUTPATIENT)
Dept: PSYCHIATRY | Facility: CLINIC | Age: 69
End: 2024-01-11
Payer: MEDICARE

## 2024-01-11 DIAGNOSIS — F43.22 ADJUSTMENT DISORDER WITH ANXIOUS MOOD: Primary | ICD-10-CM

## 2024-01-11 PROCEDURE — 99999 PR PBB SHADOW E&M-EST. PATIENT-LVL II: CPT | Mod: PBBFAC,,, | Performed by: PSYCHOLOGIST

## 2024-01-11 PROCEDURE — 99212 OFFICE O/P EST SF 10 MIN: CPT | Mod: PBBFAC | Performed by: PSYCHOLOGIST

## 2024-01-11 PROCEDURE — 90837 PSYTX W PT 60 MINUTES: CPT | Mod: ,,, | Performed by: PSYCHOLOGIST

## 2024-01-11 NOTE — PROGRESS NOTES
PSYCHO-ONCOLOGY NOTE/ Individual Psychotherapy       Date: 1/11/2024   Site of therapist:  [facility]          Therapeutic Intervention: Met with patient.  Outpatient - Behavior modifying psychotherapy 60 min - CPT code 55528  The patient's last visit with me was on 12/20/2023.    Problem list  Patient Active Problem List   Diagnosis    Cough    Cognitive complaints    Adjustment disorder with anxious mood    Cervical vertigo    Dyslipidemia    Essential hypertension    Hormone replacement therapy    Intertrigo    Neck pain    Vitamin D deficiency    Vulvitis       Chief complaint/reason for encounter: anxiety   Met with patient to evaluate psychosocial adaptation to caregiving    Current Medications  Current Outpatient Medications   Medication    acetaminophen (TYLENOL) 500 MG tablet    ammonium lactate 12 % Crea    econazole nitrate 1 % cream    estradiol (ESTRACE) 1 MG tablet    fluconazole (DIFLUCAN) 100 MG tablet    hydroCHLOROthiazide (HYDRODIURIL) 25 MG tablet    loratadine (CLARITIN) 10 mg tablet    meloxicam (MOBIC) 15 MG tablet    methen-m.blue-s.phos-phsal-hyo (URIBEL) 118-10-40.8-36 mg Cap    methocarbamoL (ROBAXIN) 750 MG Tab    multivitamin capsule    naproxen (NAPROSYN) 500 MG tablet    naproxen (NAPROSYN) 500 MG tablet    nystatin (MYCOSTATIN) cream    omeprazole (PRILOSEC) 10 MG capsule    tiZANidine (ZANAFLEX) 2 MG tablet    triamterene-hydrochlorothiazide 37.5-25 mg (DYAZIDE) 37.5-25 mg per capsule     No current facility-administered medications for this visit.       Objective:  Verena oTscano arrived promptly for the session.  Ms. Toscano was independently ambulatory at the time of session. The patient was fully cooperative throughout the session.  Appearance: age appropriate, casually  dressed, well groomed, prominent bruises on face and bandaging from recent fall  Behavior/Cooperation: friendly and cooperative  Speech: normal in rate, volume, and tone and appropriate quality,  "quantity and organization of sentences  Mood: dysthymic  Affect: dysphoric  Thought Process: goal-directed, logical  Thought Content: normal,  No delusions or paranoia; did not appear to be responding to internal stimuli during the session  Orientation: grossly intact  Memory: Grossly intact  Attention Span/Concentration: Attends to session without distraction; reports no difficulty  Fund of Knowledge: average  Estimate of Intelligence: average from verbal skills and history  Cognition: grossly intact  Insight: patient has awareness of illness; good insight into own behavior and behavior of others  Judgment: the patient's behavior is adequate to circumstances    Interval history and content of current session: Patient discussed events and activities since the time of last visit.  Patient reports having had a serious fall several days ago on her porch (head hit a window sill). She needed stitches, but had no LOC. She feels "decrepit and shaky."  Discussed argument with her  after this event. She believes this event highlights her need to pay attention to what she is doing and not try to multitask. It also reminds her to prioritize self-care, even when inconvenient to others.    Allowing others (trung Nakul) to own their emotions/choices discussed.    Prior: discussion of sexual changes: Enjoyable and fulfilling sex life prior to 's illness. Historical use of Viagra, Cialis. 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 Since Padcev, his ability to have an erection (or ejaculate without an erection) is almost gone. She feels his interest is low (although they still cuddle and show other physical affection). She wonders about his thinking about their change in interaction and whether he is interested in other potential assistive devices for intercourse. (Her drive has not changed, is comfortable masturbating, unsure how he thinks " about her sexual interest).     Risk parameters:   Patient reports no suicidal ideation  Patient reports no homicidal ideation  Patient reports no self-injurious behavior  Patient reports no violent behavior   Safety needs:  None at this time      Verbal deficits: None     Patient's response to intervention:The patient's response to intervention is accepting, motivated.     Progress toward goals: Progress as Expected        Patient reported outcomes:      Distress thermometer:       1/11/2024     7:45 AM 12/20/2023    10:08 AM 12/7/2023     6:58 AM 11/15/2023     1:10 PM 11/1/2023     5:34 PM 10/9/2023    12:49 PM 9/20/2023     8:57 AM   DISTRESS SCREENING   Distress Score 7 4 5 2 3 1 1   Practical Concerns Taking care of myself;Finances Insurance/Financial Insurance/Financial Insurance/Financial Insurance/Financial;Treatment Decisions Insurance/Financial Insurance/Financial   Social Concerns Relationship with spouse or partner Dealing with Partner Dealing with Children Dealing with Children Dealing with Children;Dealing with Partner Dealing with Children Dealing with Children   Emotional Concerns Worry or anxiety;Fear;Feelings of worthlessness or being a burden Nervousness;Worry Sadness;Worry Fears;Worry Fears;Sadness;Worry Worry Fears;Worry   Retire Spiritual or Pentecostal Concerns  No No No No No No   Spiritual or Pentecostal Concerns None of these         Physical Concerns Pain;Memory or concentration;Loss or change of physical abilities Pain Pain Pain Pain Pain Pain           PHQ-9=PHQ-9 Total Score: 3 (01/11/24 0747) ; Initial visit: 7       DEANNA-7= Initial visit:6       1/11/2024     7:46 AM 12/20/2023    10:09 AM 12/7/2023     7:05 AM   GAD7   1. Feeling nervous, anxious, or on edge? 2 1 2   2. Not being able to stop or control worrying? 1 1 0   3. Worrying too much about different things? 1 1 1   4. Trouble relaxing? 1 1 0   5. Being so restless that it is hard to sit still? 0 0 0   6. Becoming easily annoyed  or irritable? 1 1 0   7. Feeling afraid as if something awful might happen? 1 0 1   DEANNA-7 Score 7 5 4          Client Strengths: verbal, intelligent, successful, good social support, good insight, commitment to wellness      Treatment Plan:individual psychotherapy  Target symptoms: adjustment  Why chosen therapy is appropriate versus another modality: relevant to diagnosis, patient responds to this modality, evidence based practice  Outcome monitoring methods: self-report, checklist/rating scale  Therapeutic intervention type: behavior modifying psychotherapy  Prognosis: Good    Goals from last visit: Met   Behavioral goals prior to next visit:    Exercise: as per Jonathan THORPE   Stress management:   Deadline to Curt on financial issues   Social engagement:    Nutrition:    Smoking Cessation:   Therapy: prioritize self-care      Return to clinic: 2 weeks     Length of Service (minutes direct face-to-face contact): 60    Diagnosis:     ICD-10-CM ICD-9-CM   1. Adjustment disorder with anxious mood  F43.22 309.24         Xavier Núñez, PhD  [place] License #[id number]  [place] License #[id number]

## 2024-01-14 ENCOUNTER — OFFICE VISIT (OUTPATIENT)
Dept: URGENT CARE | Facility: CLINIC | Age: 69
End: 2024-01-14
Payer: MEDICARE

## 2024-01-14 ENCOUNTER — HOSPITAL ENCOUNTER (EMERGENCY)
Facility: OTHER | Age: 69
Discharge: HOME | End: 2024-01-14
Attending: EMERGENCY MEDICINE
Payer: MEDICARE

## 2024-01-14 VITALS
HEART RATE: 65 BPM | HEIGHT: 66 IN | TEMPERATURE: 98 F | OXYGEN SATURATION: 99 % | BODY MASS INDEX: 31.5 KG/M2 | WEIGHT: 196 LBS | DIASTOLIC BLOOD PRESSURE: 59 MMHG | SYSTOLIC BLOOD PRESSURE: 141 MMHG

## 2024-01-14 VITALS
TEMPERATURE: 98 F | SYSTOLIC BLOOD PRESSURE: 138 MMHG | OXYGEN SATURATION: 99 % | HEART RATE: 62 BPM | DIASTOLIC BLOOD PRESSURE: 68 MMHG | RESPIRATION RATE: 16 BRPM

## 2024-01-14 DIAGNOSIS — M54.2 NECK PAIN: ICD-10-CM

## 2024-01-14 DIAGNOSIS — S00.83XA CONTUSION OF FACE, INITIAL ENCOUNTER: Primary | ICD-10-CM
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
## 2024-01-14 DIAGNOSIS — G44.309 INTERMITTENT POST-TRAUMATIC HEADACHE: Primary | ICD-10-CM

## 2024-01-14 PROCEDURE — 99213 OFFICE O/P EST LOW 20 MIN: CPT | Mod: S$GLB,,, | Performed by: FAMILY MEDICINE

## 2024-01-14 PROCEDURE — 99284 EMERGENCY DEPT VISIT MOD MDM: CPT | Mod: 25

## 2024-01-14 NOTE — PROGRESS NOTES
"Subjective:      Patient ID: Verena Toscano is a 68 y.o. female.    Vitals:  height is 5' 6" (1.676 m) and weight is 88.9 kg (195 lb 15.8 oz). Her oral temperature is 98.3 °F (36.8 °C). Her blood pressure is 141/59 (abnormal) and her pulse is 65. Her oxygen saturation is 99%.     Chief Complaint: Suture / Staple Removal    Pt presents to  a suture removal. Pt states that she has been changing bandages every other day.   Pt s/p fall blunt head trauma 1 week ago,  sutures were placed at time of visit then and now patient returned for suture removal- Sutures were removed here in urgent care.      Patient now with bruising under her eyes and into cheeks but reports no direct trauma or pain in those sites.   She has also had brief, intermittent HA and neck pain.     Pt also states that her right knee is bothering her, and is having headaches. Pt has an appt with ortho to assess her knee tomorrow. No weakness, nor tingling in extremities.     Suture / Staple Removal  There has been no drainage from the wound. The redness has improved. There is no swelling present. The pain has improved ().     ROS   Objective:     Physical Exam  Constitutional: Pt oriented to person, place, and time.  Non-toxic appearance.   Patient does not appear ill. No distress. normal  HENT: No icterus or facial swelling appreciated  Head: Normocephalic ,  bilateral bruising on inferior both eyes extending into the mid face.  Nose: No congestion.   Pulmonary/Chest: Effort normal. No stridor. No respiratory distress.   Abdominal: Normal appearance. Abdomen exhibits no distension.   Musculoskeletal:         General: No swelling.   Neurological: no focal deficit. Patient is alert and oriented to person, place, and time.   Skin: 3 sutures removed and forehead laceration and Steri-Strips placed overlying to keep wound edges well approximated.  No erythema or drainage.  Healing well  Psychiatric: Patients behavior is normal. Mood, " judgment and thought content normal.     Assessment:     1. Intermittent post-traumatic headache    2. Neck pain    3. Fall, subsequent encounter        Plan:       Intermittent post-traumatic headache    Neck pain    Fall, subsequent encounter      Patient now with raccoon eyes  bruising and intermittent HA and neck pain     Advise ED for further evaluation including CT head and  C-spine non contrast.      Would have ordered for same-day outpatient but imaging unavailable today and tomorrow  .

## 2024-01-14 NOTE — PATIENT INSTRUCTIONS
Pt s/p fall blunt head trauma 1 week ago,  sutures were placed at time of visit then and now patient returned for suture removal- Sutures were removed here in urgent care.      Patient now with raccoon eyes  bruising and intermittent HA and neck pain     Advise ED for further evaluation including CT head and  C-spine non contrast.      Would have ordered for same-day outpatient but imaging unavailable today and tomorrow

## 2024-01-15 NOTE — ED TRIAGE NOTES
Pt fell on 1/6 striking the top of forehead(she had stitches removed today and steri-strips placed). Pt now has bruising to liudmila eyes, cheeks and lower part of face over the past week. She was seen at  and sent here for CT scan.

## 2024-01-15 NOTE — ED NOTES
Received pt laying in bed, pt denies any pain or discomfort at this time. Pt has bruising noted to face from recent fall. Pt sent here from

## 2024-01-15 NOTE — ED PROVIDER NOTES
Encounter Date: 1/14/2024    SCRIBE #1 NOTE: I, Concetta Blackwell, am scribing for, and in the presence of,  Noelle Tanner MD. I have scribed the following portions of the note - Other sections scribed: HPI, ROS, PE.       History     Chief Complaint   Patient presents with    Fall     Pt had mechanical fall 1/6, hit face/head/neck. Went to  on 1/6 for laceration repair. Never had head CT. Went for follow up at  today and saw a different provider, was sent to ED for CT scan. Pt has bruising to bilateral eye lids, under eyes, bilateral cheeks, and forehead. C/O HA 7/10 that occurs in different places around head. Was treating pain with Naproxen, but stopped due to bruising. No bleeding/bruising noted to mastoid process.     Time seen by physician: 6:05 PM    This is a 68 y.o. female who presents following a fall that occurred 8 days ago. The pt states that her foot got caught on the top step of her porch and she fell forward. She states that she believes her head hit glass, but the glass did not break. She reports that she did not lose consciousness and was able to get up on her own. She notes that she was seen at urgent care to get stitches, but did not get any imaging.  She endorses having a similar fall 2 weeks ago. She endorses a headache, occasional neck pain, anxiousness, and feeling unlike herself. She notes that the bruising to her face has migrated down over the last several days. She does not take blood thinners but had been taking naproxen for pain. She was recommended by multiple people to come to the ED for CT imaging.  Denies any chest pain/SOB.  Denies N/V/D.      Patient denies any other complaints at this time.     The history is provided by the patient.     Review of patient's allergies indicates:   Allergen Reactions    Bactrim [sulfamethoxazole-trimethoprim] Other (See Comments)     Severe headaches. crystaline baldder    Demerol [meperidine] Nausea And Vomiting    Sulfa (sulfonamide antibiotics)  Hives    Codeine Nausea And Vomiting    Morphine Nausea And Vomiting and Rash     Past Medical History:   Diagnosis Date    GERD (gastroesophageal reflux disease)     Hypertension     Therapy      Past Surgical History:   Procedure Laterality Date    ADENOIDECTOMY      APPENDECTOMY      breast reduction       SECTION      HYSTERECTOMY      LAMINECTOMY      L4 and 5    TONSILLECTOMY       Family History   Problem Relation Age of Onset    Allergies Son     Allergic rhinitis Son     Allergic rhinitis Daughter     Allergies Daughter     Eczema Daughter     Hypertension Mother     Stroke Father     Cancer Father     Hypertension Father     Angioedema Neg Hx     Asthma Neg Hx     Immunodeficiency Neg Hx      Social History     Tobacco Use    Smoking status: Never    Smokeless tobacco: Never   Substance Use Topics    Alcohol use: Yes     Alcohol/week: 1.0 standard drink of alcohol     Types: 1 Glasses of wine per week     Comment: beer, none today    Drug use: No     Review of Systems   Constitutional:  Negative for chills and fever.   HENT:  Positive for facial swelling. Negative for congestion, rhinorrhea and trouble swallowing.    Respiratory:  Negative for chest tightness and shortness of breath.    Cardiovascular:  Positive for leg swelling. Negative for chest pain and palpitations.   Gastrointestinal:  Negative for abdominal pain, diarrhea, nausea and vomiting.   Genitourinary:  Negative for dysuria and flank pain.   Musculoskeletal:  Positive for neck pain. Negative for back pain and gait problem.   Skin:  Positive for color change. Negative for wound.   Neurological:  Positive for headaches. Negative for dizziness and syncope.   Psychiatric/Behavioral:  The patient is nervous/anxious.        Physical Exam     Initial Vitals [24 1722]   BP Pulse Resp Temp SpO2   (!) 185/86 74 16 98.2 °F (36.8 °C) 100 %      MAP       --         Physical Exam    Nursing note and vitals reviewed.  Constitutional: She  appears well-developed and well-nourished.   HENT:   Head: Normocephalic and atraumatic.   Diffuse ecchymoses and yellowish discoloration to the face, most notably across forehead, eyelids, inferior periorbital regions, nasal bridge, and nasolabial folds bilaterally.  Steristrips noted to forehead.      TMs clear bilaterally     Eyes: Conjunctivae are normal.   Neck: Neck supple.   Normal range of motion.  Cardiovascular:  Normal rate, regular rhythm and normal heart sounds.           Pulmonary/Chest: Breath sounds normal. No respiratory distress. She has no wheezes. She has no rales.   Abdominal: Abdomen is soft. Bowel sounds are normal. She exhibits no distension. There is no abdominal tenderness. There is no rebound.   Musculoskeletal:         General: Normal range of motion.      Cervical back: Normal range of motion and neck supple.      Comments: No CTL midline tenderness.     Trace to 1+ edema to the lower extremities.     Neurological: She is alert and oriented to person, place, and time.   Ambulatory with steady gait.     Skin: Skin is warm and dry. Capillary refill takes less than 2 seconds.         ED Course   Procedures  Labs Reviewed - No data to display       Imaging Results              CT Maxillofacial Without Contrast (Final result)  Result time 01/14/24 19:29:00      Final result by Minh Recinos MD (01/14/24 19:29:00)                   Impression:      No acute abnormality.      Electronically signed by: Minh Recinos  Date:    01/14/2024  Time:    19:29               Narrative:    EXAMINATION:  CT MAXILLOFACIAL WITHOUT CONTRAST    CLINICAL HISTORY:  Facial trauma, blunt;    TECHNIQUE:  Low dose axial images, sagittal and coronal reformations were obtained through the face.  Contrast was not administered.    COMPARISON:  None    FINDINGS:  No acute facial fractures are detected.    Nasal bones are intact.  Zygomatic arches are intact.  Nasal septum is midline.    The orbits are intact.  Nasal  septum is midline.  The mandible is intact.    Minimal left supraorbital scalp contusion on the left.    Globes are intact.    Paranasal sinuses and mastoid air cells are within normal limits.    No soft tissue mass or fluid collection.                                       CT Head Without Contrast (Final result)  Result time 01/14/24 18:55:48      Final result by Bertrand Kim DO (01/14/24 18:55:48)                   Impression:      No acute intracranial abnormality.      Electronically signed by: Bertrand Kim  Date:    01/14/2024  Time:    18:55               Narrative:    EXAMINATION:  CT HEAD WITHOUT CONTRAST    CLINICAL HISTORY:  Head trauma, minor (Age >= 65y);    TECHNIQUE:  Low dose axial CT images obtained throughout the head without intravenous contrast. Sagittal and coronal reconstructions were performed.    COMPARISON:  CT head dated 02/13/2018.    FINDINGS:  Ventricles and sulci are normal in size for age without evidence of hydrocephalus. No extra-axial blood or fluid collections.  There are chronic microvascular ischemic changes, stable.  No parenchymal mass, hemorrhage, edema or major vascular distribution infarct.    No calvarial fracture.  The scalp is unremarkable.  Bilateral paranasal sinuses and mastoid air cells are clear.                                       Medications - No data to display  Medical Decision Making  6:05PM:  Patient is a 68-year-old female who presents to the emergency room after a fall.  Her fall occurred about 8 days ago though she still has fairly extensive bruising noted to her face.  The bruising has migrate inferiorly which would be expected.  She has not received any neuroimaging.  She is neurologically intact.  I have a low suspicion for a significant intracranial hemorrhage, given her age, will plan for a CT.  Will continue to follow and reassess.    Amount and/or Complexity of Data Reviewed  External Data Reviewed: notes.  Radiology: ordered and independent  interpretation performed. Decision-making details documented in ED Course.    7:57 PM:  Patient's CT is negative for any acute findings.  Will continue for symptomatic treatment and conservative management at home.  I do not feel that further work up in the ED is indicated at this time.  I updated pt regarding results and I counseled pt regarding supportive care measures.  I have discussed with the pt ED return warnings and need for close PCP f/u.  Pt agreeable to plan and all questions answered.  I feel that pt is stable for discharge and management as an outpatient and no further intervention is needed at this time.  Pt is comfortable returning to the ED if needed.  Will DC home in stable condition.          Scribe Attestation:   Scribe #1: I performed the above scribed service and the documentation accurately describes the services I performed. I attest to the accuracy of the note.              Physician Attestation for Scribe: I, Noelle Tanner, reviewed documentation as scribed in my presence, which is both accurate and complete.                  Clinical Impression:  Final diagnoses:  [S00.83XA] Contusion of face, initial encounter (Primary)          ED Disposition Condition    Discharge Stable          ED Prescriptions    None       Follow-up Information       Follow up With Specialties Details Why Contact Info    Lai Rivas Jr., MD Internal Medicine   [address]  [address]  [place] [address]  [phone]               Noelle Tanner MD  01/14/24 2101       Noelle Tanner MD  01/14/24 2106       Noelle Tanner MD  01/14/24 1550

## 2024-01-24 ENCOUNTER — TELEPHONE (OUTPATIENT)
Dept: PSYCHIATRY | Facility: CLINIC | Age: 69
End: 2024-01-24
Payer: MEDICARE

## 2024-01-24 ENCOUNTER — OFFICE VISIT (OUTPATIENT)
Dept: PSYCHIATRY | Facility: CLINIC | Age: 69
End: 2024-01-24
Payer: MEDICARE

## 2024-01-24 DIAGNOSIS — R41.9 COGNITIVE COMPLAINTS: ICD-10-CM

## 2024-01-24 DIAGNOSIS — F43.22 ADJUSTMENT DISORDER WITH ANXIOUS MOOD: Primary | ICD-10-CM

## 2024-01-24 PROCEDURE — 99999 PR PBB SHADOW E&M-EST. PATIENT-LVL II: CPT | Mod: PBBFAC,,, | Performed by: PSYCHOLOGIST

## 2024-01-24 PROCEDURE — 90837 PSYTX W PT 60 MINUTES: CPT | Mod: ,,, | Performed by: PSYCHOLOGIST

## 2024-01-24 PROCEDURE — 99212 OFFICE O/P EST SF 10 MIN: CPT | Mod: PBBFAC | Performed by: PSYCHOLOGIST

## 2024-01-24 NOTE — PROGRESS NOTES
PSYCHO-ONCOLOGY NOTE/ Individual Psychotherapy       Date: 1/24/2024   Site of therapist:  [facility]          Therapeutic Intervention: Met with patient.  Outpatient - Behavior modifying psychotherapy 60 min - CPT code 41042  The patient's last visit with me was on 1/11/2024.    Problem list  Patient Active Problem List   Diagnosis    Cough    Cognitive complaints    Adjustment disorder with anxious mood    Cervical vertigo    Dyslipidemia    Essential hypertension    Hormone replacement therapy    Intertrigo    Neck pain    Vitamin D deficiency    Vulvitis       Chief complaint/reason for encounter: anxiety   Met with patient to evaluate psychosocial adaptation to caregiving    Current Medications  Current Outpatient Medications   Medication    acetaminophen (TYLENOL) 500 MG tablet    ammonium lactate 12 % Crea    econazole nitrate 1 % cream    estradiol (ESTRACE) 1 MG tablet    fluconazole (DIFLUCAN) 100 MG tablet    hydroCHLOROthiazide (HYDRODIURIL) 25 MG tablet    loratadine (CLARITIN) 10 mg tablet    meloxicam (MOBIC) 15 MG tablet    methen-m.blue-s.phos-phsal-hyo (URIBEL) 118-10-40.8-36 mg Cap    methocarbamoL (ROBAXIN) 750 MG Tab    multivitamin capsule    naproxen (NAPROSYN) 500 MG tablet    naproxen (NAPROSYN) 500 MG tablet    nystatin (MYCOSTATIN) cream    omeprazole (PRILOSEC) 10 MG capsule    triamterene-hydrochlorothiazide 37.5-25 mg (DYAZIDE) 37.5-25 mg per capsule     No current facility-administered medications for this visit.       Objective:  Verena Toscano arrived promptly for the session.  Ms. Toscano was independently ambulatory at the time of session. The patient was fully cooperative throughout the session.  Appearance: age appropriate, casually  dressed, well groomed, continued prominent bruises on face and bandaging from recent fall  Behavior/Cooperation: friendly and cooperative  Speech: normal in rate, volume, and tone and appropriate quality, quantity and organization of  sentences  Mood: euthymic  Affect: euthymic  Thought Process: goal-directed, logical  Thought Content: normal,  No delusions or paranoia; did not appear to be responding to internal stimuli during the session  Orientation: grossly intact  Memory: Grossly intact  Attention Span/Concentration: Attends to session without distraction; reports no difficulty  Fund of Knowledge: average  Estimate of Intelligence: average from verbal skills and history  Cognition: grossly intact  Insight: patient has awareness of illness; good insight into own behavior and behavior of others  Judgment: the patient's behavior is adequate to circumstances    Interval history and content of current session: Patient discussed events and activities since the time of last visit.  Patient reports having had a CT scan due to her fall, at the suggestion of an MD at urgent care (all clear).  She has had some periods of fatigue and confusion, so she believes she may have had a few more effects of her concussion than initially believed. She is prioritizing self-care and allowing others to do what they can for themselves. She has been assertive with Nakul. She had a great visit with Katalina (who is accepting her financial responsibilities more).    Prior: discussion of sexual changes: Enjoyable and fulfilling sex life prior to 's illness. Historical use of Viagra, Cialis. 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 Since Padcev, his ability to have an erection (or ejaculate without an erection) is almost gone. She feels his interest is low (although they still cuddle and show other physical affection). She wonders about his thinking about their change in interaction and whether he is interested in other potential assistive devices for intercourse. (Her drive has not changed, is comfortable masturbating, unsure how he thinks about her sexual interest).     Risk  parameters:   Patient reports no suicidal ideation  Patient reports no homicidal ideation  Patient reports no self-injurious behavior  Patient reports no violent behavior   Safety needs:  None at this time      Verbal deficits: None     Patient's response to intervention:The patient's response to intervention is accepting, motivated.     Progress toward goals: Progress as Expected        Patient reported outcomes:      Distress thermometer:       1/24/2024     5:54 AM 1/11/2024     7:45 AM 12/20/2023    10:08 AM 12/7/2023     6:58 AM 11/15/2023     1:10 PM 11/1/2023     5:34 PM 10/9/2023    12:49 PM   DISTRESS SCREENING   Distress Score 2 7 4 5 2 3 1   Practical Concerns Taking care of myself;Taking care of others;Finances Taking care of myself;Finances Insurance/Financial Insurance/Financial Insurance/Financial Insurance/Financial;Treatment Decisions Insurance/Financial   Social Concerns None of these Relationship with spouse or partner Dealing with Partner Dealing with Children Dealing with Children Dealing with Children;Dealing with Partner Dealing with Children   Emotional Concerns Worry or anxiety Worry or anxiety;Fear;Feelings of worthlessness or being a burden Nervousness;Worry Sadness;Worry Fears;Worry Fears;Sadness;Worry Worry   Retire Spiritual or Adventism Concerns   No No No No No   Spiritual or Adventism Concerns None of these None of these        Physical Concerns Pain;Loss or change of physical abilities Pain;Memory or concentration;Loss or change of physical abilities Pain Pain Pain Pain Pain   Other Problems Pain dysfunction                 PHQ-9=PHQ-9 Total Score: 0 (01/24/24 0557) ; Initial visit: 7       DEANNA-7= Initial visit:6       1/24/2024     5:55 AM 1/11/2024     7:46 AM 12/20/2023    10:09 AM   GAD7   1. Feeling nervous, anxious, or on edge? 1 2 1   2. Not being able to stop or control worrying? 0 1 1   3. Worrying too much about different things? 0 1 1   4. Trouble relaxing? 0 1 1   5.  Being so restless that it is hard to sit still? 0 0 0   6. Becoming easily annoyed or irritable? 0 1 1   7. Feeling afraid as if something awful might happen? 0 1 0   DEANNA-7 Score 1 7 5          Client Strengths: verbal, intelligent, successful, good social support, good insight, commitment to wellness      Treatment Plan:individual psychotherapy  Target symptoms: adjustment  Why chosen therapy is appropriate versus another modality: relevant to diagnosis, patient responds to this modality, evidence based practice  Outcome monitoring methods: self-report, checklist/rating scale  Therapeutic intervention type: behavior modifying psychotherapy  Prognosis: Good    Goals from last visit: Met   Behavioral goals prior to next visit:    Exercise: as per PT   Stress management:      Social engagement:    Nutrition:    Smoking Cessation:   Therapy: prioritize self-care and sleep      Return to clinic: 2 weeks     Length of Service (minutes direct face-to-face contact): 60    Diagnosis:     ICD-10-CM ICD-9-CM   1. Adjustment disorder with anxious mood  F43.22 309.24   2. Cognitive complaints  R41.9 799.59           Xavier Núñez, PhD  [place] License #[id number]  [place] License #[id number]

## 2024-02-07 ENCOUNTER — OFFICE VISIT (OUTPATIENT)
Dept: PSYCHIATRY | Facility: CLINIC | Age: 69
End: 2024-02-07
Payer: MEDICARE

## 2024-02-07 DIAGNOSIS — F43.22 ADJUSTMENT DISORDER WITH ANXIOUS MOOD: Primary | ICD-10-CM

## 2024-02-07 PROCEDURE — 90837 PSYTX W PT 60 MINUTES: CPT | Mod: ,,, | Performed by: PSYCHOLOGIST

## 2024-02-07 PROCEDURE — 99212 OFFICE O/P EST SF 10 MIN: CPT | Mod: PBBFAC | Performed by: PSYCHOLOGIST

## 2024-02-07 PROCEDURE — 99999 PR PBB SHADOW E&M-EST. PATIENT-LVL II: CPT | Mod: PBBFAC,,, | Performed by: PSYCHOLOGIST

## 2024-02-07 NOTE — PROGRESS NOTES
PSYCHO-ONCOLOGY NOTE/ Individual Psychotherapy       Date: 2/7/2024   Site of therapist:  [facility]          Therapeutic Intervention: Met with patient.  Outpatient - Behavior modifying psychotherapy 60 min - CPT code 81866  The patient's last visit with me was on 1/24/2024.    Problem list  Patient Active Problem List   Diagnosis    Cough    Cognitive complaints    Adjustment disorder with anxious mood    Cervical vertigo    Dyslipidemia    Essential hypertension    Hormone replacement therapy    Intertrigo    Neck pain    Vitamin D deficiency    Vulvitis       Chief complaint/reason for encounter: anxiety   Met with patient to evaluate psychosocial adaptation to caregiving    Current Medications  Current Outpatient Medications   Medication    acetaminophen (TYLENOL) 500 MG tablet    ammonium lactate 12 % Crea    econazole nitrate 1 % cream    estradiol (ESTRACE) 1 MG tablet    fluconazole (DIFLUCAN) 100 MG tablet    hydroCHLOROthiazide (HYDRODIURIL) 25 MG tablet    loratadine (CLARITIN) 10 mg tablet    meloxicam (MOBIC) 15 MG tablet    methen-m.blue-s.phos-phsal-hyo (URIBEL) 118-10-40.8-36 mg Cap    methocarbamoL (ROBAXIN) 750 MG Tab    multivitamin capsule    naproxen (NAPROSYN) 500 MG tablet    naproxen (NAPROSYN) 500 MG tablet    nystatin (MYCOSTATIN) cream    omeprazole (PRILOSEC) 10 MG capsule    triamterene-hydrochlorothiazide 37.5-25 mg (DYAZIDE) 37.5-25 mg per capsule     No current facility-administered medications for this visit.       Objective:  Verena Toscano arrived promptly for the session.  Ms. Toscano was independently ambulatory at the time of session. The patient was fully cooperative throughout the session.  Appearance: age appropriate, casually  dressed, well groomed, decreased prominece of bruises on face from recent fall  Behavior/Cooperation: friendly and cooperative  Speech: normal in rate, volume, and tone and appropriate quality, quantity and organization of  "sentences  Mood: euthymic  Affect: euthymic  Thought Process: goal-directed, logical  Thought Content: normal,  No delusions or paranoia; did not appear to be responding to internal stimuli during the session  Orientation: grossly intact  Memory: Grossly intact  Attention Span/Concentration: Attends to session without distraction; reports no difficulty  Fund of Knowledge: average  Estimate of Intelligence: average from verbal skills and history  Cognition: grossly intact  Insight: patient has awareness of illness; good insight into own behavior and behavior of others  Judgment: the patient's behavior is adequate to circumstances    Interval history and content of current session: Patient discussed events and activities since the time of last visit.  SHe is slightly overwhelmed by her "areas of responsibility" (preparation for daughter's wedding in 2 months, financial planning, tax issues). She discussed her shame over her professional and personal tax liabilities ("no room for self-hermelindo"). 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    Patient sleeping more than is typical for her (9p-5a instead of 10p-5a, still waking without alarm). Slight confusion and "mental fog" since concussion, but improving over time.    Prior: discussion of sexual changes: Enjoyable and fulfilling sex life prior to 's illness. Historical use of Viagra, Cialis. 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 Since Padcev, his ability to have an erection (or ejaculate without an erection) is almost gone. She feels his interest is low (although they still cuddle and show other physical affection). She wonders about his thinking about their change in interaction and whether he is interested in other potential assistive devices for intercourse. (Her drive has not changed, is comfortable masturbating, unsure how he thinks about her sexual " interest).     Risk parameters:   Patient reports no suicidal ideation  Patient reports no homicidal ideation  Patient reports no self-injurious behavior  Patient reports no violent behavior   Safety needs:  None at this time      Verbal deficits: None     Patient's response to intervention:The patient's response to intervention is accepting, motivated.     Progress toward goals: Progress as Expected        Patient reported outcomes:      Distress thermometer:       2/7/2024    11:21 AM 1/24/2024     5:54 AM 1/11/2024     7:45 AM 12/20/2023    10:08 AM 12/7/2023     6:58 AM 11/15/2023     1:10 PM 11/1/2023     5:34 PM   DISTRESS SCREENING   Distress Score 3 2 7 4 5 2 3   Practical Concerns Taking care of myself;Finances Taking care of myself;Taking care of others;Finances Taking care of myself;Finances Insurance/Financial Insurance/Financial Insurance/Financial Insurance/Financial;Treatment Decisions   Social Concerns None of these None of these Relationship with spouse or partner Dealing with Partner Dealing with Children Dealing with Children Dealing with Children;Dealing with Partner   Emotional Concerns Worry or anxiety Worry or anxiety Worry or anxiety;Fear;Feelings of worthlessness or being a burden Nervousness;Worry Sadness;Worry Fears;Worry Fears;Sadness;Worry   Retire Spiritual or Sikhism Concerns    No No No No   Spiritual or Sikhism Concerns None of these None of these None of these       Physical Concerns None of these Pain;Loss or change of physical abilities Pain;Memory or concentration;Loss or change of physical abilities Pain Pain Pain Pain   Other Problems Pain and stamina Pain dysfunction                PHQ-9= PHQ ANSWERS    Q1. Little interest or pleasure in doing things: Not at all (02/07/24 1122)  Q2. Feeling down, depressed, or hopeless: Not at all (02/07/24 1122)  Q3. Trouble falling or staying asleep, or sleeping too much: Not at all (02/07/24 1122)  Q4. Feeling tired or having little  energy: Several days (02/07/24 1122)  Q5. Poor appetite or overeating: Not at all (02/07/24 1122)  Q6. Feeling bad about yourself - or that you are a failure or have let yourself or your family down: Not at all (02/07/24 1122)  Q7. Trouble concentrating on things, such as reading the newspaper or watching television: Not at all (02/07/24 1122)  Q8. Moving or speaking so slowly that other people could have noticed. Or the opposite - being so fidgety or restless that you have been moving around a lot more than usual: Not at all (02/07/24 1122)  Q9.      PHQ8 Score : 1 (02/07/24 1122)  PHQ-9 Total Score: 1 (02/07/24 1122)  ; Initial visit: 7       DEANNA-7= Initial visit:6       2/7/2024    11:22 AM 1/24/2024     5:55 AM 1/11/2024     7:46 AM   GAD7   1. Feeling nervous, anxious, or on edge? 1 1 2   2. Not being able to stop or control worrying? 0 0 1   3. Worrying too much about different things? 1 0 1   4. Trouble relaxing? 0 0 1   5. Being so restless that it is hard to sit still? 0 0 0   6. Becoming easily annoyed or irritable? 0 0 1   7. Feeling afraid as if something awful might happen? 0 0 1   DEANNA-7 Score 2 1 7          Client Strengths: verbal, intelligent, successful, good social support, good insight, commitment to wellness      Treatment Plan:individual psychotherapy  Target symptoms: adjustment  Why chosen therapy is appropriate versus another modality: relevant to diagnosis, patient responds to this modality, evidence based practice  Outcome monitoring methods: self-report, checklist/rating scale  Therapeutic intervention type: behavior modifying psychotherapy  Prognosis: Good    Goals from last visit: Met   Behavioral goals prior to next visit:    Exercise: as per PT, continue 1x week Jazzercise   Stress management:  Update budget sheet and define priorities for $    Work on organizational strategies    Social engagement:    Nutrition:    Smoking Cessation:   Therapy: prioritize self-care and  sleep      Return to clinic: 2 weeks     Length of Service (minutes direct face-to-face contact): 60    Diagnosis:     ICD-10-CM ICD-9-CM   1. Adjustment disorder with anxious mood  F43.22 309.24           Xavier Núñez, PhD  [place] License #[id number]  [place] License #[id number]

## 2024-02-22 ENCOUNTER — OFFICE VISIT (OUTPATIENT)
Dept: PSYCHIATRY | Facility: CLINIC | Age: 69
End: 2024-02-22
Payer: MEDICARE

## 2024-02-22 DIAGNOSIS — F43.22 ADJUSTMENT DISORDER WITH ANXIOUS MOOD: Primary | ICD-10-CM

## 2024-02-22 PROCEDURE — 90834 PSYTX W PT 45 MINUTES: CPT | Mod: ,,, | Performed by: PSYCHOLOGIST

## 2024-02-22 PROCEDURE — 99212 OFFICE O/P EST SF 10 MIN: CPT | Mod: PBBFAC | Performed by: PSYCHOLOGIST

## 2024-02-22 PROCEDURE — 99999 PR PBB SHADOW E&M-EST. PATIENT-LVL II: CPT | Mod: PBBFAC,,, | Performed by: PSYCHOLOGIST

## 2024-02-22 NOTE — PROGRESS NOTES
PSYCHO-ONCOLOGY NOTE/ Individual Psychotherapy       Date: 2/22/2024   Site of therapist:  [facility]          Therapeutic Intervention: Met with patient.  Outpatient - Behavior modifying psychotherapy 45 min - CPT code 90492  The patient's last visit with me was on 2/7/2024.    Problem list  Patient Active Problem List   Diagnosis    Cough    Cognitive complaints    Adjustment disorder with anxious mood    Cervical vertigo    Dyslipidemia    Essential hypertension    Hormone replacement therapy    Intertrigo    Neck pain    Vitamin D deficiency    Vulvitis       Chief complaint/reason for encounter: anxiety   Met with patient to evaluate psychosocial adaptation to caregiving    Current Medications  Current Outpatient Medications   Medication    acetaminophen (TYLENOL) 500 MG tablet    ammonium lactate 12 % Crea    econazole nitrate 1 % cream    estradiol (ESTRACE) 1 MG tablet    fluconazole (DIFLUCAN) 100 MG tablet    hydroCHLOROthiazide (HYDRODIURIL) 25 MG tablet    loratadine (CLARITIN) 10 mg tablet    meloxicam (MOBIC) 15 MG tablet    methen-m.blue-s.phos-phsal-hyo (URIBEL) 118-10-40.8-36 mg Cap    methocarbamoL (ROBAXIN) 750 MG Tab    multivitamin capsule    naproxen (NAPROSYN) 500 MG tablet    naproxen (NAPROSYN) 500 MG tablet    nystatin (MYCOSTATIN) cream    omeprazole (PRILOSEC) 10 MG capsule    triamterene-hydrochlorothiazide 37.5-25 mg (DYAZIDE) 37.5-25 mg per capsule     No current facility-administered medications for this visit.       Objective:  Verena Toscano arrived promptly for the session.  Ms. Toscano was independently ambulatory at the time of session. The patient was fully cooperative throughout the session.  Appearance: age appropriate, casually  dressed, well groomed  Behavior/Cooperation: friendly and cooperative  Speech: normal in rate, volume, and tone and appropriate quality, quantity and organization of sentences  Mood: happy  Affect: euthymic  Thought Process:  goal-directed, logical  Thought Content: normal,  No delusions or paranoia; did not appear to be responding to internal stimuli during the session  Orientation: grossly intact  Memory: Grossly intact  Attention Span/Concentration: Attends to session without distraction; reports no difficulty  Fund of Knowledge: average  Estimate of Intelligence: average from verbal skills and history  Cognition: grossly intact  Insight: patient has awareness of illness; good insight into own behavior and behavior of others  Judgment: the patient's behavior is adequate to circumstances    Interval history and content of current session: Patient discussed events and activities since the time of last visit.  She reports having had a great [place] and good (thought difficult) conversations with Katalina. She is partnering more with Nakul on problems and directly addressed her financial concerns/budget, with good results.     Prior: discussion of sexual changes: Enjoyable and fulfilling sex life prior to 's illness. Historical use of Viagra, Cialis. 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 Since Padcev, his ability to have an erection (or ejaculate without an erection) is almost gone. She feels his interest is low (although they still cuddle and show other physical affection). She wonders about his thinking about their change in interaction and whether he is interested in other potential assistive devices for intercourse. (Her drive has not changed, is comfortable masturbating, unsure how he thinks about her sexual interest).     Risk parameters:   Patient reports no suicidal ideation  Patient reports no homicidal ideation  Patient reports no self-injurious behavior  Patient reports no violent behavior   Safety needs:  None at this time      Verbal deficits: None     Patient's response to intervention:The patient's response to intervention is  accepting, motivated.     Progress toward goals: Progress as Expected        Patient reported outcomes:      Distress thermometer:       2/22/2024     7:51 AM 2/7/2024    11:21 AM 1/24/2024     5:54 AM 1/11/2024     7:45 AM 12/20/2023    10:08 AM 12/7/2023     6:58 AM 11/15/2023     1:10 PM   DISTRESS SCREENING   Distress Score 2 3 2 7 4 5 2   Practical Concerns Taking care of myself;Insurance Taking care of myself;Finances Taking care of myself;Taking care of others;Finances Taking care of myself;Finances Insurance/Financial Insurance/Financial Insurance/Financial   Social Concerns None of these None of these None of these Relationship with spouse or partner Dealing with Partner Dealing with Children Dealing with Children   Emotional Concerns None of these Worry or anxiety Worry or anxiety Worry or anxiety;Fear;Feelings of worthlessness or being a burden Nervousness;Worry Sadness;Worry Fears;Worry   Retire Spiritual or Sikhism Concerns     No No No   Spiritual or Sikhism Concerns None of these None of these None of these None of these      Physical Concerns Pain None of these Pain;Loss or change of physical abilities Pain;Memory or concentration;Loss or change of physical abilities Pain Pain Pain   Other Problems None Pain and stamina Pain dysfunction               PHQ-9= PHQ ANSWERS    Q1. Little interest or pleasure in doing things: (P) Not at all (02/22/24 0752)  Q2. Feeling down, depressed, or hopeless: (P) Not at all (02/22/24 0752)  Q3. Trouble falling or staying asleep, or sleeping too much: (P) Not at all (02/22/24 0752)  Q4. Feeling tired or having little energy: (P) Several days (02/22/24 0752)  Q5. Poor appetite or overeating: (P) Not at all (02/22/24 0752)  Q6. Feeling bad about yourself - or that you are a failure or have let yourself or your family down: (P) Not at all (02/22/24 0752)  Q7. Trouble concentrating on things, such as reading the newspaper or watching television: (P) Several days  (02/22/24 0752)  Q8. Moving or speaking so slowly that other people could have noticed. Or the opposite - being so fidgety or restless that you have been moving around a lot more than usual: (P) Not at all (02/22/24 0752)  Q9.      PHQ8 Score : (P) 2 (02/22/24 0752)  PHQ-9 Total Score: (P) 2 (02/22/24 0752)  ; Initial visit: 7       DEANNA-7= Initial visit:6       2/22/2024     7:52 AM 2/7/2024    11:22 AM 1/24/2024     5:55 AM   GAD7   1. Feeling nervous, anxious, or on edge? 0 1 1   2. Not being able to stop or control worrying? 0 0 0   3. Worrying too much about different things? 1 1 0   4. Trouble relaxing? 0 0 0   5. Being so restless that it is hard to sit still? 0 0 0   6. Becoming easily annoyed or irritable? 0 0 0   7. Feeling afraid as if something awful might happen? 0 0 0   DEANNA-7 Score 1 2 1          Client Strengths: verbal, intelligent, successful, good social support, good insight, commitment to wellness      Treatment Plan:individual psychotherapy  Target symptoms: adjustment  Why chosen therapy is appropriate versus another modality: relevant to diagnosis, patient responds to this modality, evidence based practice  Outcome monitoring methods: self-report, checklist/rating scale  Therapeutic intervention type: behavior modifying psychotherapy  Prognosis: Good    Goals from last visit: Met   Behavioral goals prior to next visit:    Exercise: as per PT, continue 1x week Jazzercise   Stress management: use skills  partner with Nakul on problem-solving    Social engagement:    Nutrition:    Smoking Cessation:   Therapy: focus on excitement about wedding      Return to clinic: 2 weeks     Length of Service (minutes direct face-to-face contact): 45    Diagnosis:     ICD-10-CM ICD-9-CM   1. Adjustment disorder with anxious mood  F43.22 309.24           Xavier Núñez, PhD  [place] License #[id number]  [place] License #[id number]

## 2024-03-06 ENCOUNTER — OFFICE VISIT (OUTPATIENT)
Dept: PSYCHIATRY | Facility: CLINIC | Age: 69
End: 2024-03-06
Payer: MEDICARE

## 2024-03-06 DIAGNOSIS — F43.22 ADJUSTMENT DISORDER WITH ANXIOUS MOOD: Primary | ICD-10-CM

## 2024-03-06 PROCEDURE — 90837 PSYTX W PT 60 MINUTES: CPT | Mod: ,,, | Performed by: PSYCHOLOGIST

## 2024-03-06 PROCEDURE — 99999 PR PBB SHADOW E&M-EST. PATIENT-LVL II: CPT | Mod: PBBFAC,,, | Performed by: PSYCHOLOGIST

## 2024-03-06 PROCEDURE — 99212 OFFICE O/P EST SF 10 MIN: CPT | Mod: PBBFAC | Performed by: PSYCHOLOGIST

## 2024-03-06 NOTE — PROGRESS NOTES
PSYCHO-ONCOLOGY NOTE/ Individual Psychotherapy       Date: 3/6/2024   Site of therapist:  [facility]          Therapeutic Intervention: Met with patient.  Outpatient - Behavior modifying psychotherapy 60 min - CPT code 39760  The patient's last visit with me was on 2/22/2024.      Problem list  Patient Active Problem List   Diagnosis    Cough    Cognitive complaints    Adjustment disorder with anxious mood    Cervical vertigo    Dyslipidemia    Essential hypertension    Hormone replacement therapy    Intertrigo    Neck pain    Vitamin D deficiency    Vulvitis       Chief complaint/reason for encounter: anxiety   Met with patient to evaluate psychosocial adaptation to caregiving    Current Medications  Current Outpatient Medications   Medication    acetaminophen (TYLENOL) 500 MG tablet    ammonium lactate 12 % Crea    econazole nitrate 1 % cream    estradiol (ESTRACE) 1 MG tablet    fluconazole (DIFLUCAN) 100 MG tablet    hydroCHLOROthiazide (HYDRODIURIL) 25 MG tablet    loratadine (CLARITIN) 10 mg tablet    meloxicam (MOBIC) 15 MG tablet    methen-m.blue-s.phos-phsal-hyo (URIBEL) 118-10-40.8-36 mg Cap    methocarbamoL (ROBAXIN) 750 MG Tab    multivitamin capsule    naproxen (NAPROSYN) 500 MG tablet    naproxen (NAPROSYN) 500 MG tablet    nystatin (MYCOSTATIN) cream    omeprazole (PRILOSEC) 10 MG capsule    triamterene-hydrochlorothiazide 37.5-25 mg (DYAZIDE) 37.5-25 mg per capsule     No current facility-administered medications for this visit.       Objective:  Verena Toscano arrived promptly for the session.  Ms. Toscano was independently ambulatory at the time of session. The patient was fully cooperative throughout the session.  Appearance: age appropriate, casually  dressed, well groomed  Behavior/Cooperation: friendly and cooperative  Speech: normal in rate, volume, and tone and appropriate quality, quantity and organization of sentences  Mood: euthymic  Affect: euthymic  Thought Process:  goal-directed, logical  Thought Content: normal,  No delusions or paranoia; did not appear to be responding to internal stimuli during the session  Orientation: grossly intact  Memory: Grossly intact  Attention Span/Concentration: Attends to session without distraction; reports no difficulty  Fund of Knowledge: average  Estimate of Intelligence: average from verbal skills and history  Cognition: grossly intact  Insight: patient has awareness of illness; good insight into own behavior and behavior of others  Judgment: the patient's behavior is adequate to circumstances    Interval history and content of current session: Patient discussed events and activities since the time of last visit.  She reports having had a good few weeks. Discussed some struggles in the area of perfectionism and some spiritual concerns.  She does note having more problems with planning/scheduling in recent weeks.    Prior: discussion of sexual changes: Enjoyable and fulfilling sex life prior to 's illness. Historical use of Viagra, Cialis. 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 Since Padcev, his ability to have an erection (or ejaculate without an erection) is almost gone. She feels his interest is low (although they still cuddle and show other physical affection). She wonders about his thinking about their change in interaction and whether he is interested in other potential assistive devices for intercourse. (Her drive has not changed, is comfortable masturbating, unsure how he thinks about her sexual interest).     Risk parameters:   Patient reports no suicidal ideation  Patient reports no homicidal ideation  Patient reports no self-injurious behavior  Patient reports no violent behavior   Safety needs:  None at this time      Verbal deficits: None     Patient's response to intervention:The patient's response to intervention is accepting,  motivated.     Progress toward goals: Progress as Expected        Patient reported outcomes:      Distress thermometer:       3/5/2024     7:48 PM 2/22/2024     7:51 AM 2/7/2024    11:21 AM 1/24/2024     5:54 AM 1/11/2024     7:45 AM 12/20/2023    10:08 AM 12/7/2023     6:58 AM   DISTRESS SCREENING   Distress Score 1 2 3 2 7 4 5   Practical Concerns Finances Taking care of myself;Insurance Taking care of myself;Finances Taking care of myself;Taking care of others;Finances Taking care of myself;Finances Insurance/Financial Insurance/Financial   Social Concerns Relationship with children None of these None of these None of these Relationship with spouse or partner Dealing with Partner Dealing with Children   Emotional Concerns None of these None of these Worry or anxiety Worry or anxiety Worry or anxiety;Fear;Feelings of worthlessness or being a burden Nervousness;Worry Sadness;Worry   Retire Spiritual or Amish Concerns      No No   Spiritual or Amish Concerns None of these None of these None of these None of these None of these     Physical Concerns Fatigue Pain None of these Pain;Loss or change of physical abilities Pain;Memory or concentration;Loss or change of physical abilities Pain Pain   Other Problems  None Pain and stamina Pain dysfunction              PHQ-9= PHQ ANSWERS    Q1. Little interest or pleasure in doing things: Not at all (03/05/24 1951)  Q2. Feeling down, depressed, or hopeless: Not at all (03/05/24 1951)  Q3. Trouble falling or staying asleep, or sleeping too much: Not at all (03/05/24 1951)  Q4. Feeling tired or having little energy: Several days (03/05/24 1951)  Q5. Poor appetite or overeating: Several days (03/05/24 1951)  Q6. Feeling bad about yourself - or that you are a failure or have let yourself or your family down: Not at all (03/05/24 1951)  Q7. Trouble concentrating on things, such as reading the newspaper or watching television: Not at all (03/05/24 1951)  Q8. Moving or  speaking so slowly that other people could have noticed. Or the opposite - being so fidgety or restless that you have been moving around a lot more than usual: Not at all (03/05/24 1951)  Q9.      PHQ8 Score : 2 (03/05/24 1951)  PHQ-9 Total Score: 2 (03/05/24 1951)  ; Initial visit: 7       DEANNA-7= Initial visit:6       3/5/2024     7:50 PM 2/22/2024     7:52 AM 2/7/2024    11:22 AM   GAD7   1. Feeling nervous, anxious, or on edge? 1 0 1   2. Not being able to stop or control worrying? 0 0 0   3. Worrying too much about different things? 0 1 1   4. Trouble relaxing? 0 0 0   5. Being so restless that it is hard to sit still? 0 0 0   6. Becoming easily annoyed or irritable? 1 0 0   7. Feeling afraid as if something awful might happen? 0 0 0   DEANNA-7 Score 2 1 2          Client Strengths: verbal, intelligent, successful, good social support, good insight, commitment to wellness      Treatment Plan:individual psychotherapy  Target symptoms: adjustment  Why chosen therapy is appropriate versus another modality: relevant to diagnosis, patient responds to this modality, evidence based practice  Outcome monitoring methods: self-report, checklist/rating scale  Therapeutic intervention type: behavior modifying psychotherapy  Prognosis: Good    Goals from last visit: Met   Behavioral goals prior to next visit:    Exercise: as per PT, continue 1x week Jazzercise   Stress management:talk to     Change self-talk around perfectionism   Social engagement:    Nutrition:    Smoking Cessation:   Therapy:       Return to clinic: 2 weeks     Length of Service (minutes direct face-to-face contact): 60    Diagnosis:     ICD-10-CM ICD-9-CM   1. Adjustment disorder with anxious mood  F43.22 309.24           Xavier Núñez, PhD  [place] License #[id number]  [place] License #[id number]

## 2024-03-13 ENCOUNTER — TELEPHONE (OUTPATIENT)
Dept: PSYCHIATRY | Facility: CLINIC | Age: 69
End: 2024-03-13
Payer: MEDICARE

## 2024-03-21 ENCOUNTER — OFFICE VISIT (OUTPATIENT)
Dept: PSYCHIATRY | Facility: CLINIC | Age: 69
End: 2024-03-21
Payer: MEDICARE

## 2024-03-21 DIAGNOSIS — F43.22 ADJUSTMENT DISORDER WITH ANXIOUS MOOD: Primary | ICD-10-CM

## 2024-03-21 PROCEDURE — 99212 OFFICE O/P EST SF 10 MIN: CPT | Mod: PBBFAC | Performed by: PSYCHOLOGIST

## 2024-03-21 PROCEDURE — 90834 PSYTX W PT 45 MINUTES: CPT | Mod: ,,, | Performed by: PSYCHOLOGIST

## 2024-03-21 PROCEDURE — 99999 PR PBB SHADOW E&M-EST. PATIENT-LVL II: CPT | Mod: PBBFAC,,, | Performed by: PSYCHOLOGIST

## 2024-03-21 NOTE — PROGRESS NOTES
PSYCHO-ONCOLOGY NOTE/ Individual Psychotherapy       Date: 3/21/2024   Site of therapist:  [facility]          Therapeutic Intervention: Met with patient.  Outpatient - Behavior modifying psychotherapy 45 min - CPT code 57458  The patient's last visit with me was on 3/6/2024.    Problem list  Patient Active Problem List   Diagnosis    Cough    Cognitive complaints    Adjustment disorder with anxious mood    Cervical vertigo    Dyslipidemia    Essential hypertension    Hormone replacement therapy    Intertrigo    Neck pain    Vitamin D deficiency    Vulvitis       Chief complaint/reason for encounter: anxiety   Met with patient to evaluate psychosocial adaptation to caregiving    Current Medications  Current Outpatient Medications   Medication    acetaminophen (TYLENOL) 500 MG tablet    ammonium lactate 12 % Crea    econazole nitrate 1 % cream    estradiol (ESTRACE) 1 MG tablet    fluconazole (DIFLUCAN) 100 MG tablet    hydroCHLOROthiazide (HYDRODIURIL) 25 MG tablet    loratadine (CLARITIN) 10 mg tablet    meloxicam (MOBIC) 15 MG tablet    methen-m.blue-s.phos-phsal-hyo (URIBEL) 118-10-40.8-36 mg Cap    methocarbamoL (ROBAXIN) 750 MG Tab    multivitamin capsule    naproxen (NAPROSYN) 500 MG tablet    naproxen (NAPROSYN) 500 MG tablet    nystatin (MYCOSTATIN) cream    omeprazole (PRILOSEC) 10 MG capsule    triamterene-hydrochlorothiazide 37.5-25 mg (DYAZIDE) 37.5-25 mg per capsule     No current facility-administered medications for this visit.       Objective:  Verena Toscano arrived promptly for the session.  Ms. Toscano was independently ambulatory at the time of session. The patient was fully cooperative throughout the session.  Appearance: age appropriate, casually  dressed, well groomed  Behavior/Cooperation: friendly and cooperative  Speech: normal in rate, volume, and tone and appropriate quality, quantity and organization of sentences  Mood: euthymic  Affect: euthymic  Thought Process:  goal-directed, logical  Thought Content: normal,  No delusions or paranoia; did not appear to be responding to internal stimuli during the session  Orientation: grossly intact  Memory: Grossly intact  Attention Span/Concentration: Attends to session without distraction; reports no difficulty  Fund of Knowledge: average  Estimate of Intelligence: average from verbal skills and history  Cognition: grossly intact  Insight: patient has awareness of illness; good insight into own behavior and behavior of others  Judgment: the patient's behavior is adequate to circumstances    Interval history and content of current session: Patient discussed events and activities since the time of last visit.  She reports having had a good few weeks. Discussed patient's feelings of overwhelm due to less than 1 month until daughter's wedding.Discussed pacing with activities to adapt to chronic pain.    Prior: discussion of sexual changes: Enjoyable and fulfilling sex life prior to 's illness. Historical use of Viagra, Cialis. 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 Since Padcev, his ability to have an erection (or ejaculate without an erection) is almost gone. She feels his interest is low (although they still cuddle and show other physical affection). She wonders about his thinking about their change in interaction and whether he is interested in other potential assistive devices for intercourse. (Her drive has not changed, is comfortable masturbating, unsure how he thinks about her sexual interest).     Risk parameters:   Patient reports no suicidal ideation  Patient reports no homicidal ideation  Patient reports no self-injurious behavior  Patient reports no violent behavior   Safety needs:  None at this time      Verbal deficits: None     Patient's response to intervention:The patient's response to intervention is accepting,  motivated.     Progress toward goals: Progress as Expected        Patient reported outcomes:      Distress thermometer:       3/20/2024     9:53 AM 3/5/2024     7:48 PM 2/22/2024     7:51 AM 2/7/2024    11:21 AM 1/24/2024     5:54 AM 1/11/2024     7:45 AM 12/20/2023    10:08 AM   DISTRESS SCREENING   Distress Score 2 1 2 3 2 7 4   Practical Concerns Taking care of myself;Finances Finances Taking care of myself;Insurance Taking care of myself;Finances Taking care of myself;Taking care of others;Finances Taking care of myself;Finances Insurance/Financial   Social Concerns None of these Relationship with children None of these None of these None of these Relationship with spouse or partner Dealing with Partner   Emotional Concerns Worry or anxiety None of these None of these Worry or anxiety Worry or anxiety Worry or anxiety;Fear;Feelings of worthlessness or being a burden Nervousness;Worry   Retire Spiritual or Spiritism Concerns       No   Spiritual or Spiritism Concerns None of these None of these None of these None of these None of these None of these    Physical Concerns Pain;Loss or change of physical abilities Fatigue Pain None of these Pain;Loss or change of physical abilities Pain;Memory or concentration;Loss or change of physical abilities Pain   Other Problems   None Pain and stamina Pain dysfunction             PHQ-9= PHQ ANSWERS    Q1. Little interest or pleasure in doing things: Not at all (03/20/24 0955)  Q2. Feeling down, depressed, or hopeless: Not at all (03/20/24 0955)  Q3. Trouble falling or staying asleep, or sleeping too much: Not at all (03/20/24 0955)  Q4. Feeling tired or having little energy: Several days (03/20/24 0955)  Q5. Poor appetite or overeating: Not at all (03/20/24 0955)  Q6. Feeling bad about yourself - or that you are a failure or have let yourself or your family down: Several days (03/20/24 0955)  Q7. Trouble concentrating on things, such as reading the newspaper or watching  television: Not at all (03/20/24 0955)  Q8. Moving or speaking so slowly that other people could have noticed. Or the opposite - being so fidgety or restless that you have been moving around a lot more than usual: Not at all (03/20/24 0955)  Q9.      PHQ8 Score : 2 (03/20/24 0955)  PHQ-9 Total Score: 2 (03/20/24 0955)  ; Initial visit: 7       DEANNA-7= Initial visit:6       3/20/2024     9:54 AM 3/5/2024     7:50 PM 2/22/2024     7:52 AM   GAD7   1. Feeling nervous, anxious, or on edge? 1 1 0   2. Not being able to stop or control worrying? 0 0 0   3. Worrying too much about different things? 1 0 1   4. Trouble relaxing? 0 0 0   5. Being so restless that it is hard to sit still? 0 0 0   6. Becoming easily annoyed or irritable? 0 1 0   7. Feeling afraid as if something awful might happen? 0 0 0   DEANNA-7 Score 2 2 1          Client Strengths: verbal, intelligent, successful, good social support, good insight, commitment to wellness      Treatment Plan:individual psychotherapy  Target symptoms: adjustment  Why chosen therapy is appropriate versus another modality: relevant to diagnosis, patient responds to this modality, evidence based practice  Outcome monitoring methods: self-report, checklist/rating scale  Therapeutic intervention type: behavior modifying psychotherapy  Prognosis: Good    Goals from last visit: Met   Behavioral goals prior to next visit:    Exercise: as per PT, continue 1x week Jazzercise   Stress management:PES   Social engagement:    Nutrition:    Smoking Cessation:   Therapy:       Return to clinic: 2 weeks     Length of Service (minutes direct face-to-face contact): 45    Diagnosis:     ICD-10-CM ICD-9-CM   1. Adjustment disorder with anxious mood  F43.22 309.24           Xavier Núñez, PhD  [place] License #[id number]  [place] License #[id number]

## 2024-04-04 ENCOUNTER — OFFICE VISIT (OUTPATIENT)
Dept: PSYCHIATRY | Facility: CLINIC | Age: 69
End: 2024-04-04
Payer: MEDICARE

## 2024-04-04 DIAGNOSIS — F43.22 ADJUSTMENT DISORDER WITH ANXIOUS MOOD: Primary | ICD-10-CM

## 2024-04-04 PROCEDURE — 99212 OFFICE O/P EST SF 10 MIN: CPT | Mod: PBBFAC | Performed by: PSYCHOLOGIST

## 2024-04-04 PROCEDURE — 90834 PSYTX W PT 45 MINUTES: CPT | Mod: ,,, | Performed by: PSYCHOLOGIST

## 2024-04-04 PROCEDURE — 99999 PR PBB SHADOW E&M-EST. PATIENT-LVL II: CPT | Mod: PBBFAC,,, | Performed by: PSYCHOLOGIST

## 2024-04-04 NOTE — PROGRESS NOTES
PSYCHO-ONCOLOGY NOTE/ Individual Psychotherapy       Date: 4/4/2024   Site of therapist:  [facility]          Therapeutic Intervention: Met with patient.  Outpatient - Behavior modifying psychotherapy 45 min - CPT code 78445  The patient's last visit with me was on 3/21/2024.    Problem list  Patient Active Problem List   Diagnosis    Cough    Cognitive complaints    Adjustment disorder with anxious mood    Cervical vertigo    Dyslipidemia    Essential hypertension    Hormone replacement therapy    Intertrigo    Neck pain    Vitamin D deficiency    Vulvitis       Chief complaint/reason for encounter: anxiety   Met with patient to evaluate psychosocial adaptation to caregiving    Current Medications  Current Outpatient Medications   Medication    acetaminophen (TYLENOL) 500 MG tablet    ammonium lactate 12 % Crea    econazole nitrate 1 % cream    estradiol (ESTRACE) 1 MG tablet    fluconazole (DIFLUCAN) 100 MG tablet    hydroCHLOROthiazide (HYDRODIURIL) 25 MG tablet    loratadine (CLARITIN) 10 mg tablet    meloxicam (MOBIC) 15 MG tablet    methen-m.blue-s.phos-phsal-hyo (URIBEL) 118-10-40.8-36 mg Cap    methocarbamoL (ROBAXIN) 750 MG Tab    multivitamin capsule    naproxen (NAPROSYN) 500 MG tablet    naproxen (NAPROSYN) 500 MG tablet    nystatin (MYCOSTATIN) cream    omeprazole (PRILOSEC) 10 MG capsule    triamterene-hydrochlorothiazide 37.5-25 mg (DYAZIDE) 37.5-25 mg per capsule     No current facility-administered medications for this visit.       Objective:  Verena Toscano arrived promptly for the session.  Ms. Toscano was independently ambulatory at the time of session. The patient was fully cooperative throughout the session.  Appearance: age appropriate, casually  dressed, well groomed  Behavior/Cooperation: friendly and cooperative  Speech: normal in rate, volume, and tone and appropriate quality, quantity and organization of sentences  Mood:  mildly anxious  Affect: occasionally tearful  Thought  "Process: goal-directed, logical  Thought Content: normal,  No delusions or paranoia; did not appear to be responding to internal stimuli during the session  Orientation: grossly intact  Memory: Grossly intact  Attention Span/Concentration: Attends to session without distraction; reports no difficulty  Fund of Knowledge: average  Estimate of Intelligence: average from verbal skills and history  Cognition: grossly intact  Insight: patient has awareness of illness; good insight into own behavior and behavior of others  Judgment: the patient's behavior is adequate to circumstances    Interval history and content of current session: Patient discussed events and activities since the time of last visit.  She reports having had a fairly good few weeks, with periods of overwhelm due to daughter's impending wedding. Discussed not allowing daughter's expectations to control her beliefs (no "shoulds"). Discussed pacing with activities to adapt to chronic pain.    Prior: discussion of sexual changes: Enjoyable and fulfilling sex life prior to 's illness. Historical use of Viagra, Cialis. 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 Since Padcev, his ability to have an erection (or ejaculate without an erection) is almost gone. She feels his interest is low (although they still cuddle and show other physical affection). She wonders about his thinking about their change in interaction and whether he is interested in other potential assistive devices for intercourse. (Her drive has not changed, is comfortable masturbating, unsure how he thinks about her sexual interest).     Risk parameters:   Patient reports no suicidal ideation  Patient reports no homicidal ideation  Patient reports no self-injurious behavior  Patient reports no violent behavior   Safety needs:  None at this time      Verbal deficits: None     Patient's response to " intervention:The patient's response to intervention is accepting, motivated.     Progress toward goals: Progress as Expected        Patient reported outcomes:      Distress thermometer:       4/4/2024     6:24 AM 3/20/2024     9:53 AM 3/5/2024     7:48 PM 2/22/2024     7:51 AM 2/7/2024    11:21 AM 1/24/2024     5:54 AM 1/11/2024     7:45 AM   DISTRESS SCREENING   Distress Score 3 2 1 2 3 2 7   Practical Concerns Taking care of myself;Finances Taking care of myself;Finances Finances Taking care of myself;Insurance Taking care of myself;Finances Taking care of myself;Taking care of others;Finances Taking care of myself;Finances   Social Concerns None of these None of these Relationship with children None of these None of these None of these Relationship with spouse or partner   Emotional Concerns Worry or anxiety Worry or anxiety None of these None of these Worry or anxiety Worry or anxiety Worry or anxiety;Fear;Feelings of worthlessness or being a burden   Spiritual or Zoroastrian Concerns None of these None of these None of these None of these None of these None of these None of these   Physical Concerns Pain;Loss or change of physical abilities Pain;Loss or change of physical abilities Fatigue Pain None of these Pain;Loss or change of physical abilities Pain;Memory or concentration;Loss or change of physical abilities   Other Problems    None Pain and stamina Pain dysfunction            PHQ-9= PHQ ANSWERS    Q1. Little interest or pleasure in doing things: Not at all (04/04/24 0628)  Q2. Feeling down, depressed, or hopeless: Not at all (04/04/24 0628)  Q3. Trouble falling or staying asleep, or sleeping too much: Not at all (04/04/24 0628)  Q4. Feeling tired or having little energy: Several days (04/04/24 0628)  Q5. Poor appetite or overeating: Not at all (04/04/24 0628)  Q6. Feeling bad about yourself - or that you are a failure or have let yourself or your family down: Not at all (04/04/24 0628)  Q7. Trouble  concentrating on things, such as reading the newspaper or watching television: Not at all (04/04/24 0628)  Q8. Moving or speaking so slowly that other people could have noticed. Or the opposite - being so fidgety or restless that you have been moving around a lot more than usual: Not at all (04/04/24 0628)  Q9.      PHQ8 Score : 1 (04/04/24 0628)  PHQ-9 Total Score: 1 (04/04/24 0628)  ; Initial visit: 7       DEANNA-7= Initial visit:6       4/4/2024     6:27 AM 3/20/2024     9:54 AM 3/5/2024     7:50 PM   GAD7   1. Feeling nervous, anxious, or on edge? 1 1 1   2. Not being able to stop or control worrying? 1 0 0   3. Worrying too much about different things? 1 1 0   4. Trouble relaxing? 0 0 0   5. Being so restless that it is hard to sit still? 0 0 0   6. Becoming easily annoyed or irritable? 0 0 1   7. Feeling afraid as if something awful might happen? 0 0 0   DEANNA-7 Score 3 2 2          Client Strengths: verbal, intelligent, successful, good social support, good insight, commitment to wellness      Treatment Plan:individual psychotherapy  Target symptoms: adjustment  Why chosen therapy is appropriate versus another modality: relevant to diagnosis, patient responds to this modality, evidence based practice  Outcome monitoring methods: self-report, checklist/rating scale  Therapeutic intervention type: behavior modifying psychotherapy  Prognosis: Good    Goals from last visit: Met   Behavioral goals prior to next visit:    Exercise: as per PT, continue 1x week Jazzercise   Stress management:PES   Social engagement:    Nutrition:    Smoking Cessation:   Therapy:       Return to clinic: 2 weeks     Length of Service (minutes direct face-to-face contact): 45    Diagnosis:     ICD-10-CM ICD-9-CM   1. Adjustment disorder with anxious mood  F43.22 309.24           Xavier Núñez, PhD  [place] License #[id number]  [place] License #[id number]

## 2024-04-25 ENCOUNTER — OFFICE VISIT (OUTPATIENT)
Dept: URGENT CARE | Facility: CLINIC | Age: 69
End: 2024-04-25
Payer: MEDICARE

## 2024-04-25 VITALS
WEIGHT: 190 LBS | OXYGEN SATURATION: 98 % | SYSTOLIC BLOOD PRESSURE: 137 MMHG | TEMPERATURE: 98 F | RESPIRATION RATE: 16 BRPM | HEIGHT: 66 IN | HEART RATE: 66 BPM | BODY MASS INDEX: 30.53 KG/M2 | DIASTOLIC BLOOD PRESSURE: 82 MMHG

## 2024-04-25 DIAGNOSIS — R05.1 ACUTE COUGH: ICD-10-CM

## 2024-04-25 DIAGNOSIS — Z11.59 ENCOUNTER FOR SCREENING FOR OTHER VIRAL DISEASES: Primary | ICD-10-CM

## 2024-04-25 DIAGNOSIS — Z91.09 ENVIRONMENTAL ALLERGIES: ICD-10-CM

## 2024-04-25 DIAGNOSIS — J06.9 URI, ACUTE: ICD-10-CM

## 2024-04-25 LAB
CTP QC/QA: YES
SARS-COV-2 AG RESP QL IA.RAPID: NEGATIVE

## 2024-04-25 PROCEDURE — 99213 OFFICE O/P EST LOW 20 MIN: CPT | Mod: S$GLB,,, | Performed by: FAMILY MEDICINE

## 2024-04-25 PROCEDURE — 87811 SARS-COV-2 COVID19 W/OPTIC: CPT | Mod: QW,S$GLB,, | Performed by: FAMILY MEDICINE

## 2024-04-25 RX ORDER — METHYLPREDNISOLONE 4 MG/1
TABLET ORAL
Qty: 1 EACH | Refills: 0 | Status: SHIPPED | OUTPATIENT
Start: 2024-04-25

## 2024-04-25 RX ORDER — PROMETHAZINE HYDROCHLORIDE AND DEXTROMETHORPHAN HYDROBROMIDE 6.25; 15 MG/5ML; MG/5ML
5 SYRUP ORAL NIGHTLY
Qty: 35 ML | Refills: 0 | Status: SHIPPED | OUTPATIENT
Start: 2024-04-25 | End: 2024-05-02

## 2024-04-25 NOTE — PROGRESS NOTES
"Subjective:      Patient ID: Verena Toscano is a 69 y.o. female.    Vitals:  height is 5' 6" (1.676 m) and weight is 86.2 kg (190 lb). Her oral temperature is 98 °F (36.7 °C). Her blood pressure is 137/82 and her pulse is 66. Her respiration is 16 and oxygen saturation is 98%.     Chief Complaint: Cough    Patient reports a sometimes productive cough since Monday. Patient took Claritin and cough drops for her symptoms.    Cough  This is a new problem. The current episode started in the past 7 days (Monday). The problem has been gradually worsening. The problem occurs every few minutes. The cough is Productive of sputum (sometimes). Associated symptoms include postnasal drip, rhinorrhea and a sore throat. Pertinent negatives include no chills, headaches or myalgias. Nothing aggravates the symptoms. Treatments tried: Claritin and cough drops. There is no history of asthma or bronchitis.       Constitution: Negative for chills.   HENT:  Positive for postnasal drip, sore throat and trouble swallowing. Negative for sinus pain and sinus pressure.    Respiratory:  Positive for cough.    Musculoskeletal:  Negative for muscle ache.   Neurological:  Negative for headaches.      Objective:     Vitals:    04/25/24 1147   BP: 137/82   BP Location: Left arm   Patient Position: Sitting   BP Method: Medium (Automatic)   Pulse: 66   Resp: 16   Temp: 98 °F (36.7 °C)   TempSrc: Oral   SpO2: 98%   Weight: 86.2 kg (190 lb)   Height: 5' 6" (1.676 m)      Physical Exam   Constitutional: She is oriented to person, place, and time. She appears well-developed. She is cooperative.  Non-toxic appearance. She does not appear ill. No distress.   HENT:   Head: Normocephalic and atraumatic.   Ears:   Right Ear: Hearing, tympanic membrane, external ear and ear canal normal.   Left Ear: Hearing, tympanic membrane, external ear and ear canal normal.   Nose: Congestion present. No mucosal edema, rhinorrhea or nasal deformity. No epistaxis. Right " sinus exhibits no maxillary sinus tenderness and no frontal sinus tenderness. Left sinus exhibits no maxillary sinus tenderness and no frontal sinus tenderness.   Mouth/Throat: Uvula is midline and mucous membranes are normal. No trismus in the jaw. Normal dentition. No uvula swelling. Posterior oropharyngeal erythema present. No oropharyngeal exudate or posterior oropharyngeal edema.   Eyes: Conjunctivae and lids are normal. No scleral icterus.   Neck: Trachea normal and phonation normal. Neck supple. No edema present. No erythema present. No neck rigidity present.   Cardiovascular: Normal rate, regular rhythm, normal heart sounds and normal pulses.   Pulmonary/Chest: Effort normal and breath sounds normal. No respiratory distress. She has no decreased breath sounds. She has no rhonchi.   Abdominal: Normal appearance.   Lymphadenopathy:     She has no cervical adenopathy.   Neurological: She is alert and oriented to person, place, and time. She exhibits normal muscle tone.   Skin: Skin is warm, intact and not diaphoretic.   Psychiatric: Her speech is normal and behavior is normal. Judgment and thought content normal.   Nursing note and vitals reviewed.    Results for orders placed or performed in visit on 04/25/24   SARS Coronavirus 2 Antigen, POCT Manual Read   Result Value Ref Range    SARS Coronavirus 2 Antigen Negative Negative     Acceptable Yes       Assessment:     1. Encounter for screening for other viral diseases    2. Acute cough    3. URI, acute    4. Environmental allergies        Plan:       Encounter for screening for other viral diseases  -     SARS Coronavirus 2 Antigen, POCT Manual Read    2. Acute cough  -     promethazine-dextromethorphan (PROMETHAZINE-DM) 6.25-15 mg/5 mL Syrp; Take 5 mLs by mouth every evening. This medication can make you drowsy for 7 days  Dispense: 35 mL; Refill: 0    3. URI, acute  -     methylPREDNISolone (MEDROL DOSEPACK) 4 mg tablet; use as directed   Dispense: 1 each; Refill: 0    4. Environmental allergies  -     methylPREDNISolone (MEDROL DOSEPACK) 4 mg tablet; use as directed  Dispense: 1 each; Refill: 0

## 2024-04-30 ENCOUNTER — PATIENT MESSAGE (OUTPATIENT)
Dept: PSYCHIATRY | Facility: CLINIC | Age: 69
End: 2024-04-30
Payer: MEDICARE

## 2024-05-02 ENCOUNTER — TELEPHONE (OUTPATIENT)
Dept: PSYCHIATRY | Facility: CLINIC | Age: 69
End: 2024-05-02
Payer: MEDICARE

## 2024-05-29 ENCOUNTER — OFFICE VISIT (OUTPATIENT)
Dept: PSYCHIATRY | Facility: CLINIC | Age: 69
End: 2024-05-29
Payer: MEDICARE

## 2024-05-29 DIAGNOSIS — F43.22 ADJUSTMENT DISORDER WITH ANXIOUS MOOD: Primary | ICD-10-CM

## 2024-05-29 PROCEDURE — 99999 PR PBB SHADOW E&M-EST. PATIENT-LVL II: CPT | Mod: PBBFAC,,, | Performed by: PSYCHOLOGIST

## 2024-05-29 PROCEDURE — 90834 PSYTX W PT 45 MINUTES: CPT | Mod: ,,, | Performed by: PSYCHOLOGIST

## 2024-05-29 PROCEDURE — 99212 OFFICE O/P EST SF 10 MIN: CPT | Mod: PBBFAC | Performed by: PSYCHOLOGIST

## 2024-05-29 NOTE — PROGRESS NOTES
PSYCHO-ONCOLOGY NOTE/ Individual Psychotherapy       Date: 5/29/2024   Site of therapist:  [facility]          Therapeutic Intervention: Met with patient.  Outpatient - Behavior modifying psychotherapy 45 min - CPT code 64832  The patient's last visit with me was on 4/4/2024.    Problem list  Patient Active Problem List   Diagnosis    Cough    Cognitive complaints    Adjustment disorder with anxious mood    Cervical vertigo    Dyslipidemia    Essential hypertension    Hormone replacement therapy    Intertrigo    Neck pain    Vitamin D deficiency    Vulvitis       Chief complaint/reason for encounter: anxiety   Met with patient to evaluate psychosocial adaptation to caregiving    Current Medications  Current Outpatient Medications   Medication    acetaminophen (TYLENOL) 500 MG tablet    ammonium lactate 12 % Crea    econazole nitrate 1 % cream    estradiol (ESTRACE) 1 MG tablet    fluconazole (DIFLUCAN) 100 MG tablet    hydroCHLOROthiazide (HYDRODIURIL) 25 MG tablet    loratadine (CLARITIN) 10 mg tablet    meloxicam (MOBIC) 15 MG tablet    methen-m.blue-s.phos-phsal-hyo (URIBEL) 118-10-40.8-36 mg Cap    methocarbamoL (ROBAXIN) 750 MG Tab    methylPREDNISolone (MEDROL DOSEPACK) 4 mg tablet    multivitamin capsule    naproxen (NAPROSYN) 500 MG tablet    naproxen (NAPROSYN) 500 MG tablet    nystatin (MYCOSTATIN) cream    omeprazole (PRILOSEC) 10 MG capsule    triamterene-hydrochlorothiazide 37.5-25 mg (DYAZIDE) 37.5-25 mg per capsule     No current facility-administered medications for this visit.       Objective:  Verena Toscano arrived promptly for the session.  Ms. Toscano was independently ambulatory at the time of session. The patient was fully cooperative throughout the session.  Appearance: age appropriate, casually  dressed, well groomed  Behavior/Cooperation: friendly and cooperative  Speech: normal in rate, volume, and tone and appropriate quality, quantity and organization of sentences  Mood:   mildly anxious  Affect: happy  Thought Process: goal-directed, logical  Thought Content: normal,  No delusions or paranoia; did not appear to be responding to internal stimuli during the session  Orientation: grossly intact  Memory: Grossly intact  Attention Span/Concentration: Attends to session without distraction; reports no difficulty  Fund of Knowledge: average  Estimate of Intelligence: average from verbal skills and history  Cognition: grossly intact  Insight: patient has awareness of illness; good insight into own behavior and behavior of others  Judgment: the patient's behavior is adequate to circumstances    Interval history and content of current session: Patient discussed events and activities since the time of last visit.  She reports having had a fairly good few weeks, with very good functioning at daughter's wedding. Discussed ways her anxiety manifested and was well-handled during the wedding weekend. Discussed pacing with activities to adapt to chronic pain (regular PT during Lent helped). Pain continues to worry about financial strains.    Prior: discussion of sexual changes: Enjoyable and fulfilling sex life prior to 's illness. Historical use of Viagra, Cialis. 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 Since Padcev, his ability to have an erection (or ejaculate without an erection) is almost gone. She feels his interest is low (although they still cuddle and show other physical affection). She wonders about his thinking about their change in interaction and whether he is interested in other potential assistive devices for intercourse. (Her drive has not changed, is comfortable masturbating, unsure how he thinks about her sexual interest).     Risk parameters:   Patient reports no suicidal ideation  Patient reports no homicidal ideation  Patient reports no self-injurious behavior  Patient reports no violent  behavior   Safety needs:  None at this time      Verbal deficits: None     Patient's response to intervention:The patient's response to intervention is accepting, motivated.     Progress toward goals: Progress as Expected        Patient reported outcomes:      Distress thermometer:       5/28/2024    11:01 AM 4/4/2024     6:24 AM 3/20/2024     9:53 AM 3/5/2024     7:48 PM 2/22/2024     7:51 AM 2/7/2024    11:21 AM 1/24/2024     5:54 AM   DISTRESS SCREENING   Distress Score 2 3 2 1 2 3 2   Practical Concerns Taking care of myself;Finances Taking care of myself;Finances Taking care of myself;Finances Finances Taking care of myself;Insurance Taking care of myself;Finances Taking care of myself;Taking care of others;Finances   Social Concerns None of these None of these None of these Relationship with children None of these None of these None of these   Emotional Concerns Worry or anxiety Worry or anxiety Worry or anxiety None of these None of these Worry or anxiety Worry or anxiety   Spiritual or Confucianist Concerns None of these None of these None of these None of these None of these None of these None of these   Physical Concerns Pain Pain;Loss or change of physical abilities Pain;Loss or change of physical abilities Fatigue Pain None of these Pain;Loss or change of physical abilities   Other Problems Motivation    None Pain and stamina Pain dysfunction           PHQ-9= PHQ ANSWERS    Q1. Little interest or pleasure in doing things: Not at all (05/28/24 1102)  Q2. Feeling down, depressed, or hopeless: Not at all (05/28/24 1102)  Q3. Trouble falling or staying asleep, or sleeping too much: Not at all (05/28/24 1102)  Q4. Feeling tired or having little energy: Several days (05/28/24 1102)  Q5. Poor appetite or overeating: Not at all (05/28/24 1102)  Q6. Feeling bad about yourself - or that you are a failure or have let yourself or your family down: Several days (05/28/24 1102)  Q7. Trouble concentrating on things, such  as reading the newspaper or watching television: Not at all (05/28/24 1102)  Q8. Moving or speaking so slowly that other people could have noticed. Or the opposite - being so fidgety or restless that you have been moving around a lot more than usual: Not at all (05/28/24 1102)  Q9.      PHQ8 Score : 2 (05/28/24 1102)  PHQ-9 Total Score: 2 (05/28/24 1102)  ; Initial visit: 7       DEANNA-7= Initial visit:6       5/28/2024    11:01 AM 4/4/2024     6:27 AM 3/20/2024     9:54 AM   GAD7   1. Feeling nervous, anxious, or on edge? 1 1 1   2. Not being able to stop or control worrying? 0 1 0   3. Worrying too much about different things? 1 1 1   4. Trouble relaxing? 0 0 0   5. Being so restless that it is hard to sit still? 0 0 0   6. Becoming easily annoyed or irritable? 1 0 0   7. Feeling afraid as if something awful might happen? 0 0 0   DEANNA-7 Score 3 3 2          Client Strengths: verbal, intelligent, successful, good social support, good insight, commitment to wellness      Treatment Plan:individual psychotherapy  Target symptoms: adjustment  Why chosen therapy is appropriate versus another modality: relevant to diagnosis, patient responds to this modality, evidence based practice  Outcome monitoring methods: self-report, checklist/rating scale  Therapeutic intervention type: behavior modifying psychotherapy  Prognosis: Good    Goals from last visit: Met   Behavioral goals prior to next visit:    Exercise: as per PT, continue 1x week Jazzercise   Stress management:PES, consider credit card charges for missed visits    Improve practice organization   Social engagement:    Nutrition:    Smoking Cessation:   Therapy:       Return to clinic: 3 weeks     Length of Service (minutes direct face-to-face contact): 45    Diagnosis:     ICD-10-CM ICD-9-CM   1. Adjustment disorder with anxious mood  F43.22 309.24           Xavier Núñez, PhD  [place] License #[id number]  [place] License #[id number]

## 2024-06-20 ENCOUNTER — OFFICE VISIT (OUTPATIENT)
Dept: PSYCHIATRY | Facility: CLINIC | Age: 69
End: 2024-06-20
Payer: MEDICARE

## 2024-06-20 DIAGNOSIS — F43.22 ADJUSTMENT DISORDER WITH ANXIOUS MOOD: Primary | ICD-10-CM

## 2024-06-20 PROCEDURE — 90837 PSYTX W PT 60 MINUTES: CPT | Mod: ,,, | Performed by: PSYCHOLOGIST

## 2024-06-20 PROCEDURE — 99999 PR PBB SHADOW E&M-EST. PATIENT-LVL II: CPT | Mod: PBBFAC,,, | Performed by: PSYCHOLOGIST

## 2024-06-20 PROCEDURE — 99212 OFFICE O/P EST SF 10 MIN: CPT | Mod: PBBFAC | Performed by: PSYCHOLOGIST

## 2024-06-20 NOTE — PROGRESS NOTES
PSYCHO-ONCOLOGY NOTE/ Individual Psychotherapy       Date: 6/20/2024   Site of therapist:  [facility]          Therapeutic Intervention: Met with patient.  Outpatient - Behavior modifying psychotherapy 60 min - CPT code 98237  The patient's last visit with me was on 5/29/2024.    Problem list  Patient Active Problem List   Diagnosis    Cough    Cognitive complaints    Adjustment disorder with anxious mood    Cervical vertigo    Dyslipidemia    Essential hypertension    Hormone replacement therapy    Intertrigo    Neck pain    Vitamin D deficiency    Vulvitis       Chief complaint/reason for encounter: anxiety   Met with patient to evaluate psychosocial adaptation to caregiving    Current Medications  Current Outpatient Medications   Medication    acetaminophen (TYLENOL) 500 MG tablet    ammonium lactate 12 % Crea    econazole nitrate 1 % cream    estradiol (ESTRACE) 1 MG tablet    fluconazole (DIFLUCAN) 100 MG tablet    hydroCHLOROthiazide (HYDRODIURIL) 25 MG tablet    loratadine (CLARITIN) 10 mg tablet    meloxicam (MOBIC) 15 MG tablet    methen-m.blue-s.phos-phsal-hyo (URIBEL) 118-10-40.8-36 mg Cap    methocarbamoL (ROBAXIN) 750 MG Tab    methylPREDNISolone (MEDROL DOSEPACK) 4 mg tablet    multivitamin capsule    naproxen (NAPROSYN) 500 MG tablet    naproxen (NAPROSYN) 500 MG tablet    nystatin (MYCOSTATIN) cream    omeprazole (PRILOSEC) 10 MG capsule    triamterene-hydrochlorothiazide 37.5-25 mg (DYAZIDE) 37.5-25 mg per capsule     No current facility-administered medications for this visit.       Objective:  Verena Toscano arrived promptly for the session.  Ms. Toscano was independently ambulatory at the time of session. The patient was fully cooperative throughout the session.  Appearance: age appropriate, casually  dressed, well groomed  Behavior/Cooperation: friendly and cooperative  Speech: normal in rate, volume, and tone and appropriate quality, quantity and organization of sentences  Mood:   mildly anxious  Affect: euthymic  Thought Process: goal-directed, logical  Thought Content: normal,  No delusions or paranoia; did not appear to be responding to internal stimuli during the session  Orientation: grossly intact  Memory: Grossly intact  Attention Span/Concentration: Attends to session without distraction; reports no difficulty  Fund of Knowledge: average  Estimate of Intelligence: average from verbal skills and history  Cognition: grossly intact  Insight: patient has awareness of illness; good insight into own behavior and behavior of others  Judgment: the patient's behavior is adequate to circumstances    Interval history and content of current session: Patient discussed events and activities since the time of last visit.  She reports having had a fairly good few weeks, with mild discord with her son and future daughter in law. Discussed ways her anxiety influences interactions with Curt and Naomi and ways to improve communication.    Prior: discussion of sexual changes: Enjoyable and fulfilling sex life prior to 's illness. Historical use of Viagra, Cialis. 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 Since Padcev, his ability to have an erection (or ejaculate without an erection) is almost gone. She feels his interest is low (although they still cuddle and show other physical affection). She wonders about his thinking about their change in interaction and whether he is interested in other potential assistive devices for intercourse. (Her drive has not changed, is comfortable masturbating, unsure how he thinks about her sexual interest).     Risk parameters:   Patient reports no suicidal ideation  Patient reports no homicidal ideation  Patient reports no self-injurious behavior  Patient reports no violent behavior   Safety needs:  None at this time      Verbal deficits: None     Patient's response to  intervention:The patient's response to intervention is accepting, motivated.     Progress toward goals: Progress as Expected        Patient reported outcomes:      Distress thermometer:       6/20/2024     7:36 AM 5/28/2024    11:01 AM 4/4/2024     6:24 AM 3/20/2024     9:53 AM 3/5/2024     7:48 PM 2/22/2024     7:51 AM 2/7/2024    11:21 AM   DISTRESS SCREENING   Distress Score 3 2 3 2 1 2 3   Practical Concerns Taking care of myself;Finances Taking care of myself;Finances Taking care of myself;Finances Taking care of myself;Finances Finances Taking care of myself;Insurance Taking care of myself;Finances   Social Concerns Relationship with children None of these None of these None of these Relationship with children None of these None of these   Emotional Concerns Worry or anxiety Worry or anxiety Worry or anxiety Worry or anxiety None of these None of these Worry or anxiety   Spiritual or Mandaen Concerns None of these None of these None of these None of these None of these None of these None of these   Physical Concerns Pain Pain Pain;Loss or change of physical abilities Pain;Loss or change of physical abilities Fatigue Pain None of these   Other Problems Dysfunctional Motivation    None Pain and stamina           PHQ-9= PHQ ANSWERS    Q1. Little interest or pleasure in doing things: Not at all (06/20/24 0738)  Q2. Feeling down, depressed, or hopeless: Not at all (06/20/24 0738)  Q3. Trouble falling or staying asleep, or sleeping too much: Not at all (06/20/24 0738)  Q4. Feeling tired or having little energy: Several days (06/20/24 0738)  Q5. Poor appetite or overeating: Not at all (06/20/24 0738)  Q6. Feeling bad about yourself - or that you are a failure or have let yourself or your family down: Not at all (06/20/24 0738)  Q7. Trouble concentrating on things, such as reading the newspaper or watching television: Not at all (06/20/24 0738)  Q8. Moving or speaking so slowly that other people could have noticed.  Or the opposite - being so fidgety or restless that you have been moving around a lot more than usual: Not at all (06/20/24 0738)  Q9.      PHQ8 Score : 1 (06/20/24 0738)  PHQ-9 Total Score: 1 (06/20/24 0738)  ; Initial visit: 7       DEANNA-7= Initial visit:6       6/20/2024     7:37 AM 5/28/2024    11:01 AM 4/4/2024     6:27 AM   GAD7   1. Feeling nervous, anxious, or on edge? 1 1 1   2. Not being able to stop or control worrying? 1 0 1   3. Worrying too much about different things? 1 1 1   4. Trouble relaxing? 0 0 0   5. Being so restless that it is hard to sit still? 0 0 0   6. Becoming easily annoyed or irritable? 0 1 0   7. Feeling afraid as if something awful might happen? 0 0 0   8. If you checked off any problems, how difficult have these problems made it for you to do your work, take care of things at home, or get along with other people? 1     DEANNA-7 Score 3 3 3          Client Strengths: verbal, intelligent, successful, good social support, good insight, commitment to wellness      Treatment Plan:individual psychotherapy  Target symptoms: adjustment  Why chosen therapy is appropriate versus another modality: relevant to diagnosis, patient responds to this modality, evidence based practice  Outcome monitoring methods: self-report, checklist/rating scale  Therapeutic intervention type: behavior modifying psychotherapy  Prognosis: Good    Goals from last visit: Met   Behavioral goals prior to next visit:    Exercise: as per PT, continue 1x week Jazzercise   Stress management:PES, consider credit card charges for missed visits    Improve practice organization   Social engagement: improved boundaries with Alessandra   Nutrition:    Smoking Cessation:   Therapy:       Return to clinic: 3 weeks     Length of Service (minutes direct face-to-face contact): 60    Diagnosis:     ICD-10-CM ICD-9-CM   1. Adjustment disorder with anxious mood  F43.22 309.24           Xavier Núñez, PhD  [place] License #[id number]  [place]  License #[id number]

## 2024-07-10 ENCOUNTER — OFFICE VISIT (OUTPATIENT)
Dept: PSYCHIATRY | Facility: CLINIC | Age: 69
End: 2024-07-10
Payer: MEDICARE

## 2024-07-10 DIAGNOSIS — F43.22 ADJUSTMENT DISORDER WITH ANXIOUS MOOD: Primary | ICD-10-CM

## 2024-07-10 PROCEDURE — 99999 PR PBB SHADOW E&M-EST. PATIENT-LVL II: CPT | Mod: PBBFAC,,, | Performed by: PSYCHOLOGIST

## 2024-07-10 PROCEDURE — 90837 PSYTX W PT 60 MINUTES: CPT | Mod: ,,, | Performed by: PSYCHOLOGIST

## 2024-07-10 PROCEDURE — 99212 OFFICE O/P EST SF 10 MIN: CPT | Mod: PBBFAC | Performed by: PSYCHOLOGIST

## 2024-07-10 NOTE — PROGRESS NOTES
PSYCHO-ONCOLOGY NOTE/ Individual Psychotherapy       Date: 7/10/2024   Site of therapist:  [facility]          Therapeutic Intervention: Met with patient.  Outpatient - Behavior modifying psychotherapy 60 min - CPT code 05301  The patient's last visit with me was on 6/20/2024.    Problem list  Patient Active Problem List   Diagnosis    Cough    Cognitive complaints    Adjustment disorder with anxious mood    Cervical vertigo    Dyslipidemia    Essential hypertension    Hormone replacement therapy    Intertrigo    Neck pain    Vitamin D deficiency    Vulvitis       Chief complaint/reason for encounter: anxiety   Met with patient to evaluate psychosocial adaptation to caregiving    Current Medications  Current Outpatient Medications   Medication    acetaminophen (TYLENOL) 500 MG tablet    ammonium lactate 12 % Crea    econazole nitrate 1 % cream    estradiol (ESTRACE) 1 MG tablet    fluconazole (DIFLUCAN) 100 MG tablet    hydroCHLOROthiazide (HYDRODIURIL) 25 MG tablet    loratadine (CLARITIN) 10 mg tablet    meloxicam (MOBIC) 15 MG tablet    methen-m.blue-s.phos-phsal-hyo (URIBEL) 118-10-40.8-36 mg Cap    methocarbamoL (ROBAXIN) 750 MG Tab    methylPREDNISolone (MEDROL DOSEPACK) 4 mg tablet    multivitamin capsule    naproxen (NAPROSYN) 500 MG tablet    naproxen (NAPROSYN) 500 MG tablet    nystatin (MYCOSTATIN) cream    omeprazole (PRILOSEC) 10 MG capsule    triamterene-hydrochlorothiazide 37.5-25 mg (DYAZIDE) 37.5-25 mg per capsule     No current facility-administered medications for this visit.       Objective:  Verena Toscano arrived promptly for the session.  Ms. Toscano was independently ambulatory at the time of session. The patient was fully cooperative throughout the session.  Appearance: age appropriate, casually  dressed, well groomed  Behavior/Cooperation: friendly and cooperative  Speech: normal in rate, volume, and tone and appropriate quality, quantity and organization of sentences  Mood:   mildly anxious , sad  Affect: sad  Thought Process: goal-directed, logical  Thought Content: normal,  No delusions or paranoia; did not appear to be responding to internal stimuli during the session  Orientation: grossly intact  Memory: Grossly intact  Attention Span/Concentration: Attends to session without distraction; reports no difficulty  Fund of Knowledge: average  Estimate of Intelligence: average from verbal skills and history  Cognition: grossly intact  Insight: patient has awareness of illness; good insight into own behavior and behavior of others  Judgment: the patient's behavior is adequate to circumstances    Interval history and content of current session: Patient discussed events and activities since the time of last visit.  She is struggling with pain and physical limitations. Discussed catastrophising and fortune-telling related to physical changes. She is feeling overwhelmed by her to-do lists.     Prior: discussion of sexual changes: Enjoyable and fulfilling sex life prior to 's illness. Historical use of Viagra, Cialis. 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 Since Padcev, his ability to have an erection (or ejaculate without an erection) is almost gone. She feels his interest is low (although they still cuddle and show other physical affection). She wonders about his thinking about their change in interaction and whether he is interested in other potential assistive devices for intercourse. (Her drive has not changed, is comfortable masturbating, unsure how he thinks about her sexual interest).     Risk parameters:   Patient reports no suicidal ideation  Patient reports no homicidal ideation  Patient reports no self-injurious behavior  Patient reports no violent behavior   Safety needs:  None at this time      Verbal deficits: None     Patient's response to intervention:The patient's response to  intervention is accepting, motivated.     Progress toward goals: Progress as Expected        Patient reported outcomes:      Distress thermometer:       7/10/2024    12:40 PM 6/20/2024     7:36 AM 5/28/2024    11:01 AM 4/4/2024     6:24 AM 3/20/2024     9:53 AM 3/5/2024     7:48 PM 2/22/2024     7:51 AM   DISTRESS SCREENING   Distress Score 4 3 2 3 2 1 2   Practical Concerns Taking care of myself;Taking care of others;Finances Taking care of myself;Finances Taking care of myself;Finances Taking care of myself;Finances Taking care of myself;Finances Finances Taking care of myself;Insurance   Social Concerns Relationship with family members Relationship with children None of these None of these None of these Relationship with children None of these   Emotional Concerns Worry or anxiety;Feelings of worthlessness or being a burden Worry or anxiety Worry or anxiety Worry or anxiety Worry or anxiety None of these None of these   Spiritual or Orthodox Concerns None of these None of these None of these None of these None of these None of these None of these   Physical Concerns Pain;Fatigue;Memory or concentration;Loss or change of physical abilities Pain Pain Pain;Loss or change of physical abilities Pain;Loss or change of physical abilities Fatigue Pain   Other Problems  Dysfunctional Motivation    None           PHQ-9= PHQ ANSWERS    Q1. Little interest or pleasure in doing things: (P) Not at all (07/10/24 1242)  Q2. Feeling down, depressed, or hopeless: (P) Several days (07/10/24 1242)  Q3. Trouble falling or staying asleep, or sleeping too much: (P) Not at all (07/10/24 1242)  Q4. Feeling tired or having little energy: (P) Several days (07/10/24 1242)  Q5. Poor appetite or overeating: (P) Several days (07/10/24 1242)  Q6. Feeling bad about yourself - or that you are a failure or have let yourself or your family down: (P) Several days (07/10/24 1242)  Q7. Trouble concentrating on things, such as reading the newspaper or  watching television: (P) Not at all (07/10/24 1242)  Q8. Moving or speaking so slowly that other people could have noticed. Or the opposite - being so fidgety or restless that you have been moving around a lot more than usual: (P) Not at all (07/10/24 1242)  Q9.      PHQ8 Score : (P) 4 (07/10/24 1242)  PHQ-9 Total Score: (P) 4 (07/10/24 1242)  ; Initial visit: 7       DEANNA-7= Initial visit:6       7/10/2024    12:41 PM 6/20/2024     7:37 AM 5/28/2024    11:01 AM   GAD7   1. Feeling nervous, anxious, or on edge? 1 1 1   2. Not being able to stop or control worrying? 1 1 0   3. Worrying too much about different things? 1 1 1   4. Trouble relaxing? 1 0 0   5. Being so restless that it is hard to sit still? 1 0 0   6. Becoming easily annoyed or irritable? 1 0 1   7. Feeling afraid as if something awful might happen? 1 0 0   8. If you checked off any problems, how difficult have these problems made it for you to do your work, take care of things at home, or get along with other people? 1 1    DEANNA-7 Score 7 3 3          Client Strengths: verbal, intelligent, successful, good social support, good insight, commitment to wellness      Treatment Plan:individual psychotherapy  Target symptoms: adjustment  Why chosen therapy is appropriate versus another modality: relevant to diagnosis, patient responds to this modality, evidence based practice  Outcome monitoring methods: self-report, checklist/rating scale  Therapeutic intervention type: behavior modifying psychotherapy  Prognosis: Good    Goals from last visit: Met   Behavioral goals prior to next visit:    Exercise: as per PT   Stress management: delegate some tasks to Nakul   Social engagement:    Nutrition:    Smoking Cessation:   Therapy: challenge cognitive distortions about pain and functioning      Return to clinic: 3 weeks     Length of Service (minutes direct face-to-face contact): 60    Diagnosis:     ICD-10-CM ICD-9-CM   1. Adjustment disorder with anxious mood   F43.22 309.24           Xavier Núñez, PhD  [place] License #[id number]  [place] License #[id number]

## 2024-07-24 ENCOUNTER — OFFICE VISIT (OUTPATIENT)
Dept: PSYCHIATRY | Facility: CLINIC | Age: 69
End: 2024-07-24
Payer: MEDICARE

## 2024-07-24 DIAGNOSIS — F43.22 ADJUSTMENT DISORDER WITH ANXIOUS MOOD: Primary | ICD-10-CM

## 2024-07-24 PROCEDURE — 99999 PR PBB SHADOW E&M-EST. PATIENT-LVL I: CPT | Mod: PBBFAC,,, | Performed by: PSYCHOLOGIST

## 2024-07-24 PROCEDURE — 99211 OFF/OP EST MAY X REQ PHY/QHP: CPT | Mod: PBBFAC | Performed by: PSYCHOLOGIST

## 2024-07-24 PROCEDURE — 90834 PSYTX W PT 45 MINUTES: CPT | Mod: ,,, | Performed by: PSYCHOLOGIST

## 2024-07-24 NOTE — PROGRESS NOTES
PSYCHO-ONCOLOGY NOTE/ Individual Psychotherapy       Date: 7/24/2024   Site of therapist:  [facility]          Therapeutic Intervention: Met with patient.  Outpatient - Behavior modifying psychotherapy 45 min - CPT code 11487  The patient's last visit with me was on 7/10/2024.    Problem list  Patient Active Problem List   Diagnosis    Cough    Cognitive complaints    Adjustment disorder with anxious mood    Cervical vertigo    Dyslipidemia    Essential hypertension    Hormone replacement therapy    Intertrigo    Neck pain    Vitamin D deficiency    Vulvitis       Chief complaint/reason for encounter: anxiety   Met with patient to evaluate psychosocial adaptation to caregiving    Current Medications  Current Outpatient Medications   Medication    acetaminophen (TYLENOL) 500 MG tablet    ammonium lactate 12 % Crea    econazole nitrate 1 % cream    estradiol (ESTRACE) 1 MG tablet    fluconazole (DIFLUCAN) 100 MG tablet    hydroCHLOROthiazide (HYDRODIURIL) 25 MG tablet    loratadine (CLARITIN) 10 mg tablet    meloxicam (MOBIC) 15 MG tablet    methen-m.blue-s.phos-phsal-hyo (URIBEL) 118-10-40.8-36 mg Cap    methocarbamoL (ROBAXIN) 750 MG Tab    methylPREDNISolone (MEDROL DOSEPACK) 4 mg tablet    multivitamin capsule    naproxen (NAPROSYN) 500 MG tablet    naproxen (NAPROSYN) 500 MG tablet    nystatin (MYCOSTATIN) cream    omeprazole (PRILOSEC) 10 MG capsule    triamterene-hydrochlorothiazide 37.5-25 mg (DYAZIDE) 37.5-25 mg per capsule     No current facility-administered medications for this visit.       Objective:  Verena Toscano arrived promptly for the session.  Ms. Toscano was independently ambulatory at the time of session. The patient was fully cooperative throughout the session.  Appearance: age appropriate, casually  dressed, well groomed  Behavior/Cooperation: friendly and cooperative  Speech: normal in rate, volume, and tone and appropriate quality, quantity and organization of sentences  Mood:   mildly anxious , sad  Affect: sad  Thought Process: goal-directed, logical  Thought Content: normal,  No delusions or paranoia; did not appear to be responding to internal stimuli during the session  Orientation: grossly intact  Memory: Grossly intact  Attention Span/Concentration: Attends to session without distraction; reports no difficulty  Fund of Knowledge: average  Estimate of Intelligence: average from verbal skills and history  Cognition: grossly intact  Insight: patient has awareness of illness; good insight into own behavior and behavior of others  Judgment: the patient's behavior is adequate to circumstances    Interval history and content of current session: Patient discussed events and activities since the time of last visit.  She is struggling with pain and physical limitations. Discussed adopting neutral self-talk around health and illness. Patient notes perceived change in cognitive functioning. Possibly related to sleep deprivation (sleeping 9/10-5 most days, no naps, no weekend catch-up, (+) EDS, has to use an alarm most days, sleep onset almost immediate)      Prior: discussion of sexual changes: Enjoyable and fulfilling sex life prior to 's illness. Historical use of Viagra, Cialis. 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 Since Padcev, his ability to have an erection (or ejaculate without an erection) is almost gone. She feels his interest is low (although they still cuddle and show other physical affection). She wonders about his thinking about their change in interaction and whether he is interested in other potential assistive devices for intercourse. (Her drive has not changed, is comfortable masturbating, unsure how he thinks about her sexual interest).     Risk parameters:   Patient reports no suicidal ideation  Patient reports no homicidal ideation  Patient reports no self-injurious  behavior  Patient reports no violent behavior   Safety needs:  None at this time      Verbal deficits: None     Patient's response to intervention:The patient's response to intervention is accepting, motivated.     Progress toward goals: Progress as Expected        Patient reported outcomes:      Distress thermometer:       7/24/2024     8:56 AM 7/10/2024    12:40 PM 6/20/2024     7:36 AM 5/28/2024    11:01 AM 4/4/2024     6:24 AM 3/20/2024     9:53 AM 3/5/2024     7:48 PM   DISTRESS SCREENING   Distress Score 4 4 3 2 3 2 1   Practical Concerns Taking care of myself;Finances Taking care of myself;Taking care of others;Finances Taking care of myself;Finances Taking care of myself;Finances Taking care of myself;Finances Taking care of myself;Finances Finances   Social Concerns Relationship with children Relationship with family members Relationship with children None of these None of these None of these Relationship with children   Emotional Concerns Worry or anxiety;Grief or loss Worry or anxiety;Feelings of worthlessness or being a burden Worry or anxiety Worry or anxiety Worry or anxiety Worry or anxiety None of these   Spiritual or Cheondoism Concerns None of these None of these None of these None of these None of these None of these None of these   Physical Concerns Pain;Fatigue Pain;Fatigue;Memory or concentration;Loss or change of physical abilities Pain Pain Pain;Loss or change of physical abilities Pain;Loss or change of physical abilities Fatigue   Other Problems   Dysfunctional Motivation              PHQ-9= PHQ ANSWERS    Q1. Little interest or pleasure in doing things: Not at all (07/24/24 0858)  Q2. Feeling down, depressed, or hopeless: Several days (07/24/24 0858)  Q3. Trouble falling or staying asleep, or sleeping too much: Not at all (07/24/24 0858)  Q4. Feeling tired or having little energy: Several days (07/24/24 0858)  Q5. Poor appetite or overeating: Not at all (07/24/24 0858)  Q6. Feeling bad  about yourself - or that you are a failure or have let yourself or your family down: Not at all (07/24/24 0858)  Q7. Trouble concentrating on things, such as reading the newspaper or watching television: Not at all (07/24/24 0858)  Q8. Moving or speaking so slowly that other people could have noticed. Or the opposite - being so fidgety or restless that you have been moving around a lot more than usual: Not at all (07/24/24 0858)  Q9. Thoughts that you would be better off dead, or of hurting yourself in some way: Not at all (07/24/24 0858)    PHQ8 Score : 2 (07/24/24 0858)  PHQ-9 Total Score: 2 (07/24/24 0858)  ; Initial visit: 7       DEANNA-7= Initial visit:6       7/24/2024     8:56 AM 7/10/2024    12:41 PM 6/20/2024     7:37 AM   GAD7   1. Feeling nervous, anxious, or on edge? 1 1 1   2. Not being able to stop or control worrying? 0 1 1   3. Worrying too much about different things? 1 1 1   4. Trouble relaxing? 0 1 0   5. Being so restless that it is hard to sit still? 0 1 0   6. Becoming easily annoyed or irritable? 1 1 0   7. Feeling afraid as if something awful might happen? 1 1 0   8. If you checked off any problems, how difficult have these problems made it for you to do your work, take care of things at home, or get along with other people? 0 1 1   DEANNA-7 Score 4 7 3          Client Strengths: verbal, intelligent, successful, good social support, good insight, commitment to wellness      Treatment Plan:individual psychotherapy  Target symptoms: adjustment  Why chosen therapy is appropriate versus another modality: relevant to diagnosis, patient responds to this modality, evidence based practice  Outcome monitoring methods: self-report, checklist/rating scale  Therapeutic intervention type: behavior modifying psychotherapy  Prognosis: Good    Goals from last visit: Met   Behavioral goals prior to next visit:    Exercise: explore other exercise options (non-Jazzercise and specific classes)   Stress management:  extend sleep time?   Social engagement:    Nutrition:    Smoking Cessation:   Therapy: challenge cognitive distortions about pain and functioning      Return to clinic: 3 weeks     Length of Service (minutes direct face-to-face contact): 45    Diagnosis:     ICD-10-CM ICD-9-CM   1. Adjustment disorder with anxious mood  F43.22 309.24           Xavier Núñez, PhD  [place] License #[id number]  [place] License #[id number]

## 2024-08-07 ENCOUNTER — PATIENT MESSAGE (OUTPATIENT)
Dept: PSYCHIATRY | Facility: CLINIC | Age: 69
End: 2024-08-07
Payer: MEDICARE

## 2024-08-14 ENCOUNTER — TELEPHONE (OUTPATIENT)
Dept: PSYCHIATRY | Facility: CLINIC | Age: 69
End: 2024-08-14
Payer: MEDICARE

## 2024-08-14 ENCOUNTER — PATIENT MESSAGE (OUTPATIENT)
Dept: PSYCHIATRY | Facility: CLINIC | Age: 69
End: 2024-08-14
Payer: MEDICARE

## 2024-08-14 NOTE — TELEPHONE ENCOUNTER
Alternative appt time offered to patient through [facility].    ----- Message from Albania Carranza sent at 8/14/2024  8:30 AM CDT -----  Regarding: Consult/Advisory  Contact: Verena  Consult/Advisory     Name Of Caller:Verena Toscano          Contact Preference:[phone] (home)        Nature of call:Calling to speak with staff in regards to  getting an appt. Please advise. Requesting a call back.

## 2024-08-16 ENCOUNTER — DOCUMENTATION ONLY (OUTPATIENT)
Dept: PSYCHIATRY | Facility: CLINIC | Age: 69
End: 2024-08-16
Payer: MEDICARE

## 2024-08-21 ENCOUNTER — OFFICE VISIT (OUTPATIENT)
Dept: PSYCHIATRY | Facility: CLINIC | Age: 69
End: 2024-08-21
Payer: MEDICARE

## 2024-08-21 DIAGNOSIS — F43.22 ADJUSTMENT DISORDER WITH ANXIOUS MOOD: Primary | ICD-10-CM

## 2024-08-21 DIAGNOSIS — Z63.6 CAREGIVER STRESS: ICD-10-CM

## 2024-08-21 PROCEDURE — 90837 PSYTX W PT 60 MINUTES: CPT | Mod: ,,, | Performed by: PSYCHOLOGIST

## 2024-08-21 SDOH — SOCIAL DETERMINANTS OF HEALTH (SDOH): DEPENDENT RELATIVE NEEDING CARE AT HOME: Z63.6

## 2024-08-21 NOTE — PROGRESS NOTES
PSYCHO-ONCOLOGY NOTE/ Individual Psychotherapy       Date: 8/21/2024   Site of therapist:  [facility]          Therapeutic Intervention: Met with patient.  Outpatient - Behavior modifying psychotherapy 60 min - CPT code 73999  The patient's last visit with me was on 7/24/2024.    Problem list  Patient Active Problem List   Diagnosis    Cough    Cognitive complaints    Adjustment disorder with anxious mood    Cervical vertigo    Dyslipidemia    Essential hypertension    Hormone replacement therapy    Intertrigo    Neck pain    Vitamin D deficiency    Vulvitis    Caregiver stress       Chief complaint/reason for encounter: anxiety   Met with patient to evaluate psychosocial adaptation to caregiving    Current Medications  Current Outpatient Medications   Medication    acetaminophen (TYLENOL) 500 MG tablet    ammonium lactate 12 % Crea    econazole nitrate 1 % cream    estradiol (ESTRACE) 1 MG tablet    fluconazole (DIFLUCAN) 100 MG tablet    hydroCHLOROthiazide (HYDRODIURIL) 25 MG tablet    loratadine (CLARITIN) 10 mg tablet    meloxicam (MOBIC) 15 MG tablet    methen-m.blue-s.phos-phsal-hyo (URIBEL) 118-10-40.8-36 mg Cap    methocarbamoL (ROBAXIN) 750 MG Tab    methylPREDNISolone (MEDROL DOSEPACK) 4 mg tablet    multivitamin capsule    naproxen (NAPROSYN) 500 MG tablet    naproxen (NAPROSYN) 500 MG tablet    nystatin (MYCOSTATIN) cream    omeprazole (PRILOSEC) 10 MG capsule    triamterene-hydrochlorothiazide 37.5-25 mg (DYAZIDE) 37.5-25 mg per capsule     No current facility-administered medications for this visit.       Objective:  Verena Toscano arrived promptly for the session.  Ms. Toscano was independently ambulatory at the time of session. The patient was fully cooperative throughout the session.  Appearance: age appropriate, casually  dressed, well groomed  Behavior/Cooperation: friendly and cooperative  Speech: normal in rate, volume, and tone and appropriate quality, quantity and organization  of sentences  Mood:  mildly anxious , sad  Affect: sad, tearful  Thought Process: goal-directed, logical  Thought Content: normal,  No delusions or paranoia; did not appear to be responding to internal stimuli during the session  Orientation: grossly intact  Memory: Grossly intact  Attention Span/Concentration: Attends to session without distraction; reports no difficulty  Fund of Knowledge: average  Estimate of Intelligence: average from verbal skills and history  Cognition: grossly intact  Insight: patient has awareness of illness; good insight into own behavior and behavior of others  Judgment: the patient's behavior is adequate to circumstances    Interval history and content of current session: Patient discussed events and activities since the time of last visit. Her  fell off of their porch step and broke his hip (s/p emergent hip replacement, currently in rehab). She is struggling with increased burden of caregiving as well as her own pain and physical limitations. Discussed adopting neutral self-talk around how she is coping. Patient has reached out to family and friends for practical support.     Prior: discussion of sexual changes: Enjoyable and fulfilling sex life prior to 's illness. Historical use of Viagra, Cialis. 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 Since Padcev, his ability to have an erection (or ejaculate without an erection) is almost gone. She feels his interest is low (although they still cuddle and show other physical affection). She wonders about his thinking about their change in interaction and whether he is interested in other potential assistive devices for intercourse. (Her drive has not changed, is comfortable masturbating, unsure how he thinks about her sexual interest).     Risk parameters:   Patient reports no suicidal ideation  Patient reports no homicidal ideation  Patient reports  no self-injurious behavior  Patient reports no violent behavior   Safety needs:  None at this time      Verbal deficits: None     Patient's response to intervention:The patient's response to intervention is accepting, motivated.     Progress toward goals: Progress as Expected        Patient reported outcomes:      Distress thermometer:       8/20/2024     5:53 PM 8/14/2024     6:25 AM 7/24/2024     8:56 AM 7/10/2024    12:40 PM 6/20/2024     7:36 AM 5/28/2024    11:01 AM 4/4/2024     6:24 AM   DISTRESS SCREENING   Distress Score 9 9 4 4 3 2 3   Practical Concerns Taking care of myself;Taking care of others;Work;Finances Taking care of myself;Taking care of others;Work;Finances Taking care of myself;Finances Taking care of myself;Taking care of others;Finances Taking care of myself;Finances Taking care of myself;Finances Taking care of myself;Finances   Social Concerns None of these None of these Relationship with children Relationship with family members Relationship with children None of these None of these   Emotional Concerns Worry or anxiety;Sadness or depression;Fear;Feelings of worthlessness or being a burden Worry or anxiety;Sadness or depression;Fear Worry or anxiety;Grief or loss Worry or anxiety;Feelings of worthlessness or being a burden Worry or anxiety Worry or anxiety Worry or anxiety   Spiritual or Baptist Concerns None of these None of these None of these None of these None of these None of these None of these   Physical Concerns Pain;Sleep;Fatigue;Loss or change of physical abilities Pain;Fatigue;Loss or change of physical abilities Pain;Fatigue Pain;Fatigue;Memory or concentration;Loss or change of physical abilities Pain Pain Pain;Loss or change of physical abilities   Other Problems  's health, coping with challenges   Dysfunctional Motivation            PHQ-9= PHQ ANSWERS    Q1. Little interest or pleasure in doing things: (P) Several days (08/20/24 1755)  Q2. Feeling down, depressed,  or hopeless: (P) Several days (08/20/24 1755)  Q3. Trouble falling or staying asleep, or sleeping too much: (P) Not at all (08/20/24 1755)  Q4. Feeling tired or having little energy: (P) Several days (08/20/24 1755)  Q5. Poor appetite or overeating: (P) Several days (08/20/24 1755)  Q6. Feeling bad about yourself - or that you are a failure or have let yourself or your family down: (P) Several days (08/20/24 1755)  Q7. Trouble concentrating on things, such as reading the newspaper or watching television: (P) Several days (08/20/24 1755)  Q8. Moving or speaking so slowly that other people could have noticed. Or the opposite - being so fidgety or restless that you have been moving around a lot more than usual: (P) Not at all (08/20/24 1755)  Q9.      PHQ8 Score : (P) 6 (08/20/24 1755)  PHQ-9 Total Score: (P) 6 (08/20/24 1755)  ; Initial visit: 7       DEANNA-7= Initial visit:6       8/20/2024     5:54 PM 8/14/2024     6:26 AM 7/24/2024     8:56 AM   GAD7   1. Feeling nervous, anxious, or on edge? 3 2 1   2. Not being able to stop or control worrying? 2 2 0   3. Worrying too much about different things? 2 2 1   4. Trouble relaxing? 1 2 0   5. Being so restless that it is hard to sit still? 0 1 0   6. Becoming easily annoyed or irritable? 1 0 1   7. Feeling afraid as if something awful might happen? 1 2 1   8. If you checked off any problems, how difficult have these problems made it for you to do your work, take care of things at home, or get along with other people? 1 3 0   DEANNA-7 Score 10 11 4          Client Strengths: verbal, intelligent, successful, good social support, good insight, commitment to wellness      Treatment Plan:individual psychotherapy  Target symptoms: adjustment  Why chosen therapy is appropriate versus another modality: relevant to diagnosis, patient responds to this modality, evidence based practice  Outcome monitoring methods: self-report, checklist/rating scale  Therapeutic intervention type:  behavior modifying psychotherapy  Prognosis: Good    Goals from last visit: Met   Behavioral goals prior to next visit:    Exercise: explore other exercise options (non-Jazzercise and specific classes)   Stress management: extend sleep time?   Social engagement:    Nutrition:    Smoking Cessation:   Therapy: prioritize self-care      Return to clinic: 3 weeks     Length of Service (minutes direct face-to-face contact): 60    Diagnosis:     ICD-10-CM ICD-9-CM   1. Adjustment disorder with anxious mood  F43.22 309.24   2. Caregiver stress  Z63.6 V61.49           Xavier Núñez, PhD  [place] License #[id number]  [place] License #[id number]

## 2024-09-04 ENCOUNTER — OFFICE VISIT (OUTPATIENT)
Dept: PSYCHIATRY | Facility: CLINIC | Age: 69
End: 2024-09-04
Payer: MEDICARE

## 2024-09-04 DIAGNOSIS — F43.22 ADJUSTMENT DISORDER WITH ANXIOUS MOOD: Primary | ICD-10-CM

## 2024-09-04 DIAGNOSIS — Z63.6 CAREGIVER STRESS: ICD-10-CM

## 2024-09-04 PROCEDURE — 99999 PR PBB SHADOW E&M-EST. PATIENT-LVL I: CPT | Mod: PBBFAC,,, | Performed by: PSYCHOLOGIST

## 2024-09-04 PROCEDURE — 99211 OFF/OP EST MAY X REQ PHY/QHP: CPT | Mod: PBBFAC | Performed by: PSYCHOLOGIST

## 2024-09-04 PROCEDURE — 90834 PSYTX W PT 45 MINUTES: CPT | Mod: ,,, | Performed by: PSYCHOLOGIST

## 2024-09-04 SDOH — SOCIAL DETERMINANTS OF HEALTH (SDOH): DEPENDENT RELATIVE NEEDING CARE AT HOME: Z63.6

## 2024-09-04 NOTE — PROGRESS NOTES
PSYCHO-ONCOLOGY NOTE/ Individual Psychotherapy       Date: 9/4/2024   Site of therapist:  [facility]          Therapeutic Intervention: Met with patient.  Outpatient - Behavior modifying psychotherapy 45 min - CPT code 44377  The patient's last visit with me was on 8/21/2024.      Problem list  Patient Active Problem List   Diagnosis    Cough    Cognitive complaints    Adjustment disorder with anxious mood    Cervical vertigo    Dyslipidemia    Essential hypertension    Hormone replacement therapy    Intertrigo    Neck pain    Vitamin D deficiency    Vulvitis    Caregiver stress       Chief complaint/reason for encounter: anxiety   Met with patient to evaluate psychosocial adaptation to caregiving    Current Medications  Current Outpatient Medications   Medication    acetaminophen (TYLENOL) 500 MG tablet    ammonium lactate 12 % Crea    econazole nitrate 1 % cream    estradiol (ESTRACE) 1 MG tablet    fluconazole (DIFLUCAN) 100 MG tablet    hydroCHLOROthiazide (HYDRODIURIL) 25 MG tablet    loratadine (CLARITIN) 10 mg tablet    meloxicam (MOBIC) 15 MG tablet    methen-m.blue-s.phos-phsal-hyo (URIBEL) 118-10-40.8-36 mg Cap    methocarbamoL (ROBAXIN) 750 MG Tab    methylPREDNISolone (MEDROL DOSEPACK) 4 mg tablet    multivitamin capsule    naproxen (NAPROSYN) 500 MG tablet    naproxen (NAPROSYN) 500 MG tablet    nystatin (MYCOSTATIN) cream    omeprazole (PRILOSEC) 10 MG capsule    triamterene-hydrochlorothiazide 37.5-25 mg (DYAZIDE) 37.5-25 mg per capsule     No current facility-administered medications for this visit.       Objective:  Verena Toscano arrived promptly for the session.  Ms. Toscano was independently ambulatory at the time of session. The patient was fully cooperative throughout the session.  Appearance: age appropriate, casually  dressed, well groomed  Behavior/Cooperation: friendly and cooperative  Speech: normal in rate, volume, and tone and appropriate quality, quantity and organization  of sentences  Mood:  mildly anxious , sad  Affect: sad, tearful  Thought Process: goal-directed, logical  Thought Content: normal,  No delusions or paranoia; did not appear to be responding to internal stimuli during the session  Orientation: grossly intact  Memory: Grossly intact  Attention Span/Concentration: Attends to session without distraction; reports no difficulty  Fund of Knowledge: average  Estimate of Intelligence: average from verbal skills and history  Cognition: grossly intact  Insight: patient has awareness of illness; good insight into own behavior and behavior of others  Judgment: the patient's behavior is adequate to circumstances    Interval history and content of current session: Patient discussed events and activities since the time of last visit. She discussed her 's recovery and her coping strategies.    Prior: discussion of sexual changes: Enjoyable and fulfilling sex life prior to 's illness. Historical use of Viagra, Cialis. 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 Since Padcev, his ability to have an erection (or ejaculate without an erection) is almost gone. She feels his interest is low (although they still cuddle and show other physical affection). She wonders about his thinking about their change in interaction and whether he is interested in other potential assistive devices for intercourse. (Her drive has not changed, is comfortable masturbating, unsure how he thinks about her sexual interest).     Risk parameters:   Patient reports no suicidal ideation  Patient reports no homicidal ideation  Patient reports no self-injurious behavior  Patient reports no violent behavior   Safety needs:  None at this time      Verbal deficits: None     Patient's response to intervention:The patient's response to intervention is accepting, motivated.     Progress toward goals: Progress as  Expected        Patient reported outcomes:      Distress thermometer:       9/4/2024     4:37 PM 8/20/2024     5:53 PM 8/14/2024     6:25 AM 7/24/2024     8:56 AM 7/10/2024    12:40 PM 6/20/2024     7:36 AM 5/28/2024    11:01 AM   DISTRESS SCREENING   Distress Score 4 9 9 4 4 3 2   Practical Concerns  Taking care of myself;Taking care of others;Work;Finances Taking care of myself;Taking care of others;Work;Finances Taking care of myself;Finances Taking care of myself;Taking care of others;Finances Taking care of myself;Finances Taking care of myself;Finances   Social Concerns  None of these None of these Relationship with children Relationship with family members Relationship with children None of these   Emotional Concerns  Worry or anxiety;Sadness or depression;Fear;Feelings of worthlessness or being a burden Worry or anxiety;Sadness or depression;Fear Worry or anxiety;Grief or loss Worry or anxiety;Feelings of worthlessness or being a burden Worry or anxiety Worry or anxiety   Spiritual or Bahai Concerns  None of these None of these None of these None of these None of these None of these   Physical Concerns  Pain;Sleep;Fatigue;Loss or change of physical abilities Pain;Fatigue;Loss or change of physical abilities Pain;Fatigue Pain;Fatigue;Memory or concentration;Loss or change of physical abilities Pain Pain   Other Problems   's health, coping with challenges   Dysfunctional Motivation           PHQ-9=5       ; Initial visit: 7       DEANNA-7=5 Initial visit:6       8/20/2024     5:54 PM 8/14/2024     6:26 AM 7/24/2024     8:56 AM   GAD7   1. Feeling nervous, anxious, or on edge? 3 2 1   2. Not being able to stop or control worrying? 2 2 0   3. Worrying too much about different things? 2 2 1   4. Trouble relaxing? 1 2 0   5. Being so restless that it is hard to sit still? 0 1 0   6. Becoming easily annoyed or irritable? 1 0 1   7. Feeling afraid as if something awful might happen? 1 2 1   8. If you  checked off any problems, how difficult have these problems made it for you to do your work, take care of things at home, or get along with other people? 1 3 0   DEANNA-7 Score 10 11 4          Client Strengths: verbal, intelligent, successful, good social support, good insight, commitment to wellness      Treatment Plan:individual psychotherapy  Target symptoms: adjustment  Why chosen therapy is appropriate versus another modality: relevant to diagnosis, patient responds to this modality, evidence based practice  Outcome monitoring methods: self-report, checklist/rating scale  Therapeutic intervention type: behavior modifying psychotherapy  Prognosis: Good    Goals from last visit: Met   Behavioral goals prior to next visit:    Exercise: future: explore other exercise options (non-Jazzercise and specific classes)   Stress management: extend sleep time   Social engagement:    Nutrition:    Smoking Cessation:   Therapy: prioritize self-care      Return to clinic: 3 weeks     Length of Service (minutes direct face-to-face contact): 45    Diagnosis:     ICD-10-CM ICD-9-CM   1. Adjustment disorder with anxious mood  F43.22 309.24   2. Caregiver stress  Z63.6 V61.49             Xavier Núñez, PhD  [place] License #[id number]  [place] License #[id number]

## 2024-09-18 ENCOUNTER — OFFICE VISIT (OUTPATIENT)
Dept: PSYCHIATRY | Facility: CLINIC | Age: 69
End: 2024-09-18
Payer: MEDICARE

## 2024-09-18 DIAGNOSIS — F43.22 ADJUSTMENT DISORDER WITH ANXIOUS MOOD: Primary | ICD-10-CM

## 2024-09-18 PROCEDURE — 99999 PR PBB SHADOW E&M-EST. PATIENT-LVL II: CPT | Mod: PBBFAC,,, | Performed by: PSYCHOLOGIST

## 2024-09-18 PROCEDURE — 99212 OFFICE O/P EST SF 10 MIN: CPT | Mod: PBBFAC | Performed by: PSYCHOLOGIST

## 2024-09-18 PROCEDURE — 90837 PSYTX W PT 60 MINUTES: CPT | Mod: ,,, | Performed by: PSYCHOLOGIST

## 2024-09-18 NOTE — PROGRESS NOTES
PSYCHO-ONCOLOGY NOTE/ Individual Psychotherapy       Date: 9/18/2024   Site of therapist:  [facility]          Therapeutic Intervention: Met with patient.  Outpatient - Behavior modifying psychotherapy 60 min - CPT code 41226  The patient's last visit with me was on 9/4/2024.    Problem list  Patient Active Problem List   Diagnosis    Cough    Cognitive complaints    Adjustment disorder with anxious mood    Cervical vertigo    Dyslipidemia    Essential hypertension    Hormone replacement therapy    Intertrigo    Neck pain    Vitamin D deficiency    Vulvitis    Caregiver stress       Chief complaint/reason for encounter: anxiety   Met with patient to evaluate psychosocial adaptation to caregiving    Current Medications  Current Outpatient Medications   Medication    acetaminophen (TYLENOL) 500 MG tablet    ammonium lactate 12 % Crea    econazole nitrate 1 % cream    estradiol (ESTRACE) 1 MG tablet    fluconazole (DIFLUCAN) 100 MG tablet    hydroCHLOROthiazide (HYDRODIURIL) 25 MG tablet    loratadine (CLARITIN) 10 mg tablet    meloxicam (MOBIC) 15 MG tablet    methen-m.blue-s.phos-phsal-hyo (URIBEL) 118-10-40.8-36 mg Cap    methocarbamoL (ROBAXIN) 750 MG Tab    methylPREDNISolone (MEDROL DOSEPACK) 4 mg tablet    multivitamin capsule    naproxen (NAPROSYN) 500 MG tablet    naproxen (NAPROSYN) 500 MG tablet    nystatin (MYCOSTATIN) cream    omeprazole (PRILOSEC) 10 MG capsule    triamterene-hydrochlorothiazide 37.5-25 mg (DYAZIDE) 37.5-25 mg per capsule     No current facility-administered medications for this visit.       Objective:  Verena Toscano arrived promptly for the session.  Ms. Toscano was independently ambulatory at the time of session. The patient was fully cooperative throughout the session.  Appearance: age appropriate, casually  dressed, well groomed  Behavior/Cooperation: friendly and cooperative  Speech: normal in rate, volume, and tone and appropriate quality, quantity and organization of  sentences  Mood:  anxious , sad  Affect: sad, tearful  Thought Process: goal-directed, logical  Thought Content: normal,  No delusions or paranoia; did not appear to be responding to internal stimuli during the session  Orientation: grossly intact  Memory: Grossly intact  Attention Span/Concentration: Attends to session without distraction; reports no difficulty  Fund of Knowledge: average  Estimate of Intelligence: average from verbal skills and history  Cognition: grossly intact  Insight: patient has awareness of illness; good insight into own behavior and behavior of others  Judgment: the patient's behavior is adequate to circumstances    Interval history and content of current session: Patient discussed events and activities since the time of last visit. She discussed her own health changes while engaged in caregiving (struggling with orthopedic issues). She is very worried about her knee pain and swelling (and has been catastrophising). She sees ortho today.  She is struggling to voice and maintain boundaries in the face of his frustration.    Prior: discussion of sexual changes: Enjoyable and fulfilling sex life prior to 's illness. Historical use of Viagra, Cialis. 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 Since Padcev, his ability to have an erection (or ejaculate without an erection) is almost gone. She feels his interest is low (although they still cuddle and show other physical affection). She wonders about his thinking about their change in interaction and whether he is interested in other potential assistive devices for intercourse. (Her drive has not changed, is comfortable masturbating, unsure how he thinks about her sexual interest).     Risk parameters:   Patient reports no suicidal ideation  Patient reports no homicidal ideation  Patient reports no self-injurious behavior  Patient reports no violent  behavior   Safety needs:  None at this time      Verbal deficits: None     Patient's response to intervention:The patient's response to intervention is accepting, motivated.     Progress toward goals: Progress as Expected        Patient reported outcomes:      Distress thermometer:       9/17/2024     8:53 AM 9/4/2024     4:37 PM 8/20/2024     5:53 PM 8/14/2024     6:25 AM 7/24/2024     8:56 AM 7/10/2024    12:40 PM 6/20/2024     7:36 AM   DISTRESS SCREENING   Distress Score 7 4 9 9 4 4 3   Practical Concerns Taking care of myself;Taking care of others;Work;Finances;Treatment decisions  Taking care of myself;Taking care of others;Work;Finances Taking care of myself;Taking care of others;Work;Finances Taking care of myself;Finances Taking care of myself;Taking care of others;Finances Taking care of myself;Finances   Social Concerns None of these  None of these None of these Relationship with children Relationship with family members Relationship with children   Emotional Concerns Worry or anxiety;Grief or loss;Feelings of worthlessness or being a burden  Worry or anxiety;Sadness or depression;Fear;Feelings of worthlessness or being a burden Worry or anxiety;Sadness or depression;Fear Worry or anxiety;Grief or loss Worry or anxiety;Feelings of worthlessness or being a burden Worry or anxiety   Spiritual or Jewish Concerns None of these  None of these None of these None of these None of these None of these   Physical Concerns Pain;Fatigue;Loss or change of physical abilities  Pain;Sleep;Fatigue;Loss or change of physical abilities Pain;Fatigue;Loss or change of physical abilities Pain;Fatigue Pain;Fatigue;Memory or concentration;Loss or change of physical abilities Pain   Other Problems    's health, coping with challenges   Dysfunctional           PHQ-9=5 PHQ8 Score : 5 (09/17/24 0856)  PHQ-9 Total Score: 5 (09/17/24 0856)  ; Initial visit: 7       DEANNA-7=5 Initial visit:6       9/17/2024     8:55 AM  "8/20/2024     5:54 PM 8/14/2024     6:26 AM   GAD7   1. Feeling nervous, anxious, or on edge? 1 3 2   2. Not being able to stop or control worrying? 1 2 2   3. Worrying too much about different things? 1 2 2   4. Trouble relaxing? 1 1 2   5. Being so restless that it is hard to sit still? 0 0 1   6. Becoming easily annoyed or irritable? 2 1 0   7. Feeling afraid as if something awful might happen? 1 1 2   8. If you checked off any problems, how difficult have these problems made it for you to do your work, take care of things at home, or get along with other people? 1 1 3   DEANNA-7 Score 7 10 11          Client Strengths: verbal, intelligent, successful, good social support, good insight, commitment to wellness      Treatment Plan:individual psychotherapy  Target symptoms: adjustment  Why chosen therapy is appropriate versus another modality: relevant to diagnosis, patient responds to this modality, evidence based practice  Outcome monitoring methods: self-report, checklist/rating scale  Therapeutic intervention type: behavior modifying psychotherapy  Prognosis: Good    Goals from last visit: Met   Behavioral goals prior to next visit:    Exercise:   Stress management: change self talk ("I can be injured and be okay")    Ask for help (trung for Oct 12 party)    Back to routine, where possible   Social engagement:    Nutrition:    Smoking Cessation:   Therapy: prioritize self-care- see MD about swelling issue          Return to clinic: 2 weeks     Length of Service (minutes direct face-to-face contact): 60    Diagnosis:     ICD-10-CM ICD-9-CM   1. Adjustment disorder with anxious mood  F43.22 309.24               Xavier Núñez, PhD  [place] License #[id number]  [place] License #[id number]                                                  "

## 2024-10-03 ENCOUNTER — TELEPHONE (OUTPATIENT)
Dept: SPORTS MEDICINE | Facility: CLINIC | Age: 69
End: 2024-10-03
Payer: MEDICARE

## 2024-10-03 NOTE — TELEPHONE ENCOUNTER
I called the patient and scheduled her an appointment with Chet Fernandez PA-C at her convenience

## 2024-10-03 NOTE — TELEPHONE ENCOUNTER
----- Message from Karen sent at 10/3/2024  2:55 PM CDT -----  Regardinnd Opinion  Contact: pt  [phone]  Pt is calling in ref to scheduling with provider for a Baker's Cyst on her right  knee. Seeking a second opinion. Pt's   Lai Mazariegos is a pt of provider. Patient Requesting Call Back @  [phone]

## 2024-10-16 ENCOUNTER — HOSPITAL ENCOUNTER (OUTPATIENT)
Dept: RADIOLOGY | Facility: HOSPITAL | Age: 69
Discharge: HOME | End: 2024-10-16
Attending: PHYSICIAN ASSISTANT
Payer: MEDICARE

## 2024-10-16 ENCOUNTER — OFFICE VISIT (OUTPATIENT)
Dept: SPORTS MEDICINE | Facility: CLINIC | Age: 69
End: 2024-10-16
Payer: MEDICARE

## 2024-10-16 ENCOUNTER — OFFICE VISIT (OUTPATIENT)
Dept: PSYCHIATRY | Facility: CLINIC | Age: 69
End: 2024-10-16
Payer: MEDICARE

## 2024-10-16 VITALS
WEIGHT: 190 LBS | SYSTOLIC BLOOD PRESSURE: 143 MMHG | DIASTOLIC BLOOD PRESSURE: 81 MMHG | BODY MASS INDEX: 30.53 KG/M2 | HEART RATE: 63 BPM | HEIGHT: 66 IN

## 2024-10-16 DIAGNOSIS — M25.561 RIGHT KNEE PAIN, UNSPECIFIED CHRONICITY: ICD-10-CM

## 2024-10-16 DIAGNOSIS — M17.11 OSTEOARTHRITIS OF RIGHT KNEE, UNSPECIFIED OSTEOARTHRITIS TYPE: ICD-10-CM

## 2024-10-16 DIAGNOSIS — M71.21 BAKER'S CYST OF KNEE, RIGHT: ICD-10-CM

## 2024-10-16 DIAGNOSIS — M25.561 RIGHT KNEE PAIN, UNSPECIFIED CHRONICITY: Primary | ICD-10-CM

## 2024-10-16 DIAGNOSIS — F43.22 ADJUSTMENT DISORDER WITH ANXIOUS MOOD: Primary | ICD-10-CM

## 2024-10-16 PROCEDURE — 99212 OFFICE O/P EST SF 10 MIN: CPT | Mod: PBBFAC,25 | Performed by: PSYCHOLOGIST

## 2024-10-16 PROCEDURE — 90834 PSYTX W PT 45 MINUTES: CPT | Mod: ,,, | Performed by: PSYCHOLOGIST

## 2024-10-16 PROCEDURE — 73564 X-RAY EXAM KNEE 4 OR MORE: CPT | Mod: 26,50,, | Performed by: RADIOLOGY

## 2024-10-16 PROCEDURE — 99999 PR PBB SHADOW E&M-EST. PATIENT-LVL IV: CPT | Mod: PBBFAC,,, | Performed by: PHYSICIAN ASSISTANT

## 2024-10-16 PROCEDURE — 73564 X-RAY EXAM KNEE 4 OR MORE: CPT | Mod: TC,50

## 2024-10-16 PROCEDURE — 99214 OFFICE O/P EST MOD 30 MIN: CPT | Mod: PBBFAC,25,27 | Performed by: PHYSICIAN ASSISTANT

## 2024-10-16 PROCEDURE — 99999 PR PBB SHADOW E&M-EST. PATIENT-LVL II: CPT | Mod: PBBFAC,,, | Performed by: PSYCHOLOGIST

## 2024-10-16 NOTE — PROGRESS NOTES
PSYCHO-ONCOLOGY NOTE/ Individual Psychotherapy       Date: 10/16/2024   Site of therapist:  [facility]          Therapeutic Intervention: Met with patient.  Outpatient - Behavior modifying psychotherapy 45 min - CPT code 31771  The patient's last visit with me was on 9/18/2024.      Problem list  Patient Active Problem List   Diagnosis    Cough    Cognitive complaints    Adjustment disorder with anxious mood    Cervical vertigo    Dyslipidemia    Essential hypertension    Hormone replacement therapy    Intertrigo    Neck pain    Vitamin D deficiency    Vulvitis    Caregiver stress       Chief complaint/reason for encounter: anxiety   Met with patient to evaluate psychosocial adaptation to caregiving    Current Medications  Current Outpatient Medications   Medication    acetaminophen (TYLENOL) 500 MG tablet    ammonium lactate 12 % Crea    econazole nitrate 1 % cream    estradiol (ESTRACE) 1 MG tablet    fluconazole (DIFLUCAN) 100 MG tablet    hydroCHLOROthiazide (HYDRODIURIL) 25 MG tablet    loratadine (CLARITIN) 10 mg tablet    meloxicam (MOBIC) 15 MG tablet    methen-m.blue-s.phos-phsal-hyo (URIBEL) 118-10-40.8-36 mg Cap    methocarbamoL (ROBAXIN) 750 MG Tab    methylPREDNISolone (MEDROL DOSEPACK) 4 mg tablet    multivitamin capsule    naproxen (NAPROSYN) 500 MG tablet    naproxen (NAPROSYN) 500 MG tablet    nystatin (MYCOSTATIN) cream    omeprazole (PRILOSEC) 10 MG capsule    triamterene-hydrochlorothiazide 37.5-25 mg (DYAZIDE) 37.5-25 mg per capsule     No current facility-administered medications for this visit.       Objective:  Verena Toscano arrived promptly for the session.  Ms. Toscano was independently ambulatory at the time of session. The patient was fully cooperative throughout the session.  Appearance: age appropriate, casually  dressed, well groomed  Behavior/Cooperation: friendly and cooperative  Speech: normal in rate, volume, and tone and appropriate quality, quantity and  organization of sentences  Mood: euthymic  Affect: mildly tearful  Thought Process: goal-directed, logical  Thought Content: normal,  No delusions or paranoia; did not appear to be responding to internal stimuli during the session  Orientation: grossly intact  Memory: Grossly intact  Attention Span/Concentration: Attends to session without distraction; reports no difficulty  Fund of Knowledge: average  Estimate of Intelligence: average from verbal skills and history  Cognition: grossly intact  Insight: patient has awareness of illness; good insight into own behavior and behavior of others  Judgment: the patient's behavior is adequate to circumstances    Interval history and content of current session: Patient discussed events and activities since the time of last visit. She discussed her own health challenges and her emotional and physical reactions to the party (which went very well). She sees ortho today.      Prior: discussion of sexual changes: Enjoyable and fulfilling sex life prior to 's illness. Historical use of Viagra, Cialis. 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 Since Padcev, his ability to have an erection (or ejaculate without an erection) is almost gone. She feels his interest is low (although they still cuddle and show other physical affection). She wonders about his thinking about their change in interaction and whether he is interested in other potential assistive devices for intercourse. (Her drive has not changed, is comfortable masturbating, unsure how he thinks about her sexual interest).     Risk parameters:   Patient reports no suicidal ideation  Patient reports no homicidal ideation  Patient reports no self-injurious behavior  Patient reports no violent behavior   Safety needs:  None at this time      Verbal deficits: None     Patient's response to intervention:The patient's response to intervention  is accepting, motivated.     Progress toward goals: Progress as Expected        Patient reported outcomes:      Distress thermometer:       10/15/2024    11:21 AM 9/17/2024     8:53 AM 9/4/2024     4:37 PM 8/20/2024     5:53 PM 8/14/2024     6:25 AM 7/24/2024     8:56 AM 7/10/2024    12:40 PM   DISTRESS SCREENING   Distress Score 3 7 4 9  9  4  4   Practical Concerns Taking care of myself;Taking care of others;Work;Finances;Treatment decisions Taking care of myself;Taking care of others;Work;Finances;Treatment decisions  Taking care of myself;Taking care of others;Work;Finances  Taking care of myself;Taking care of others;Work;Finances  Taking care of myself;Finances  Taking care of myself;Taking care of others;Finances   Social Concerns None of these None of these  None of these  None of these  Relationship with children  Relationship with family members   Emotional Concerns Worry or anxiety;Fear Worry or anxiety;Grief or loss;Feelings of worthlessness or being a burden  Worry or anxiety;Sadness or depression;Fear;Feelings of worthlessness or being a burden  Worry or anxiety;Sadness or depression;Fear  Worry or anxiety;Grief or loss  Worry or anxiety;Feelings of worthlessness or being a burden   Spiritual or Congregation Concerns None of these None of these  None of these  None of these  None of these  None of these   Physical Concerns Pain;Fatigue;Loss or change of physical abilities Pain;Fatigue;Loss or change of physical abilities  Pain;Sleep;Fatigue;Loss or change of physical abilities  Pain;Fatigue;Loss or change of physical abilities  Pain;Fatigue  Pain;Fatigue;Memory or concentration;Loss or change of physical abilities   Other Problems     's health, coping with challenges          Patient-reported           PHQ-9=PHQ ANSWERS    Q1. Little interest or pleasure in doing things: Not at all (10/16/24 0929)  Q2. Feeling down, depressed, or hopeless: Not at all (10/16/24 0929)  Q3. Trouble falling or staying  asleep, or sleeping too much: Several days (10/16/24 0929)  Q4. Feeling tired or having little energy: Several days (10/16/24 0929)  Q5. Poor appetite or overeating: Not at all (10/16/24 0929)  Q6. Feeling bad about yourself - or that you are a failure or have let yourself or your family down: More than half the days (10/16/24 0929)  Q7. Trouble concentrating on things, such as reading the newspaper or watching television: Several days (10/16/24 0929)  Q8. Moving or speaking so slowly that other people could have noticed. Or the opposite - being so fidgety or restless that you have been moving around a lot more than usual: Not at all (10/16/24 0929)  Q9.      PHQ8 Score : 5 (10/16/24 0929)  PHQ-9 Total Score: 5 (10/16/24 0929)  ; Initial visit: 7       DEANNA-7=5 Initial visit:6       10/15/2024    11:23 AM 9/17/2024     8:55 AM 8/20/2024     5:54 PM   GAD7   1. Feeling nervous, anxious, or on edge? 1 1 3    2. Not being able to stop or control worrying? 1 1 2    3. Worrying too much about different things? 1 1 2    4. Trouble relaxing? 1 1 1    5. Being so restless that it is hard to sit still? 2 0 0    6. Becoming easily annoyed or irritable? 1 2 1    7. Feeling afraid as if something awful might happen? 1 1 1    8. If you checked off any problems, how difficult have these problems made it for you to do your work, take care of things at home, or get along with other people? 1 1 1    DEANNA-7 Score 8 7 10       Patient-reported          Client Strengths: verbal, intelligent, successful, good social support, good insight, commitment to wellness      Treatment Plan:individual psychotherapy  Target symptoms: adjustment  Why chosen therapy is appropriate versus another modality: relevant to diagnosis, patient responds to this modality, evidence based practice  Outcome monitoring methods: self-report, checklist/rating scale  Therapeutic intervention type: behavior modifying psychotherapy  Prognosis: Good    Goals from last  visit: Met   Behavioral goals prior to next visit:    Exercise:   Stress management: change self talk    Social engagement:    Nutrition:    Smoking Cessation:   Therapy:          Return to clinic: 2 weeks     Length of Service (minutes direct face-to-face contact): 45    Diagnosis:     ICD-10-CM ICD-9-CM   1. Adjustment disorder with anxious mood  F43.22 309.24         Xavier Núñez, PhD  [place] License #[id number]  [place] License #[id number]

## 2024-10-16 NOTE — PROGRESS NOTES
CC: right knee pain    69 y.o. Female  reports waxing and waning non-radiating, anterior knee pain refractory to conservative mgmt that began in early July 2024.     Patient's pain is a 0/10 currently but will increase with walking for long periods of time or activity requiring her to be on her feet.     She has tried tylenol, voltaren gel, steroid pack (7/5/24) and gel injection (7/15/24) with little to no improvement of her right knee pain. This was performed by external ortho clinic, Dr. Whitlock.      Is affecting ADLs.  Reports intermittent swelling to the knee.   No mechanical symptoms, no instability, no locking or catching.     Denies any previous trauma/injuries or surgeries to the right lower extremity. 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    She previously had a previously negative DVT ultrasound on 9/20/24 with no increased leg swelling since then.     Review of Systems   Constitution: Negative. Negative for chills, fever and night sweats.   HENT: Negative for congestion and headaches.    Eyes: Negative for blurred vision, left vision loss and right vision loss.   Cardiovascular: Negative for chest pain and syncope.   Respiratory: Negative for cough and shortness of breath.    Endocrine: Negative for polydipsia, polyphagia and polyuria.   Hematologic/Lymphatic: Negative for bleeding problem. Does not bruise/bleed easily.   Skin: Negative for dry skin, itching and rash.   Musculoskeletal: Negative for falls and muscle weakness. Joint pain.  Gastrointestinal: Negative for abdominal pain and bowel incontinence.   Genitourinary: Negative for bladder incontinence and nocturia.   Neurological: Negative for disturbances in coordination, loss of balance and seizures.   Psychiatric/Behavioral: Negative for depression. The patient does not have insomnia.    Allergic/Immunologic: Negative for hives and persistent infections.   All other systems negative      PAST MEDICAL HISTORY:   Past Medical  History:   Diagnosis Date    GERD (gastroesophageal reflux disease)     Hypertension     Therapy      PAST SURGICAL HISTORY:   Past Surgical History:   Procedure Laterality Date    ADENOIDECTOMY      APPENDECTOMY      breast reduction       SECTION      HYSTERECTOMY      LAMINECTOMY      L4 and 5    TONSILLECTOMY       FAMILY HISTORY:   Family History   Problem Relation Name Age of Onset    Allergies Son      Allergic rhinitis Son      Allergic rhinitis Daughter      Allergies Daughter      Eczema Daughter      Hypertension Mother      Stroke Father      Cancer Father      Hypertension Father      Angioedema Neg Hx      Asthma Neg Hx      Immunodeficiency Neg Hx       SOCIAL HISTORY:   Social History     Socioeconomic History    Marital status:      Spouse name: Nakul    Number of children: 2   Tobacco Use    Smoking status: Never    Smokeless tobacco: Never   Substance and Sexual Activity    Alcohol use: Yes     Alcohol/week: 1.0 standard drink of alcohol     Types: 1 Glasses of wine per week     Comment: beer, none today    Drug use: No   Social History Narrative    [employer] (30+ years)    2 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    psychologist     Social Drivers of Health      Received from [facility]    Overall Financial Resource Strain (CARDIA)    Received from [facility]    Hunger Vital Sign    Received from [facility]    PRAPARE - Transportation   Physical Activity: Sufficiently Active (2022)    Received from [facility], [facility]    Exercise Vital Sign     Days of Exercise per Week: 7 days     Minutes of Exercise per Session: 50 min    Received from [facility]    [employer] - Occupational Stress Questionnaire    Received from [facility]    Housing Stability Vital Sign       MEDICATIONS:   Current Outpatient Medications:     acetaminophen (TYLENOL) 500 MG tablet, Take 1,000 mg by mouth every 6 (six) hours as needed for Pain., Disp: , Rfl:     ammonium lactate 12 % Crea, Apply  "1 g topically Daily., Disp: 140 g, Rfl: 1    econazole nitrate 1 % cream, , Disp: , Rfl:     estradiol (ESTRACE) 1 MG tablet, , Disp: , Rfl:     loratadine (CLARITIN) 10 mg tablet, Take 10 mg by mouth once daily., Disp: , Rfl:     methocarbamoL (ROBAXIN) 750 MG Tab, , Disp: , Rfl:     nystatin (MYCOSTATIN) cream, APPLY TO AFFECTED AREA TWICE A DAY IF NEEDED FOR ITCHING, Disp: , Rfl:     omeprazole (PRILOSEC) 10 MG capsule, Take 10 mg by mouth once daily., Disp: , Rfl:     triamterene-hydrochlorothiazide 37.5-25 mg (DYAZIDE) 37.5-25 mg per capsule, Take 1 capsule by mouth every morning., Disp: , Rfl:     fluconazole (DIFLUCAN) 100 MG tablet, , Disp: , Rfl:     hydroCHLOROthiazide (HYDRODIURIL) 25 MG tablet, Take 1 tablet by mouth once daily., Disp: , Rfl:     meloxicam (MOBIC) 15 MG tablet, , Disp: , Rfl:     sylvia-m.blue-s.phos-phsal-hyo (URIBEL) 118-10-40.8-36 mg Cap, , Disp: , Rfl:     methylPREDNISolone (MEDROL DOSEPACK) 4 mg tablet, use as directed, Disp: 1 each, Rfl: 0    multivitamin capsule, Take 1 capsule by mouth., Disp: , Rfl:     naproxen (NAPROSYN) 500 MG tablet, Take 500 mg by mouth 2 (two) times daily as needed., Disp: , Rfl:     naproxen (NAPROSYN) 500 MG tablet, Take 500 mg by mouth 2 (two) times daily as needed. (Patient not taking: Reported on 10/16/2024), Disp: , Rfl:   ALLERGIES:   Review of patient's allergies indicates:   Allergen Reactions    Bactrim [sulfamethoxazole-trimethoprim] Other (See Comments)     Severe headaches. crystaline baldder    Demerol [meperidine] Nausea And Vomiting    Sulfa (sulfonamide antibiotics) Hives    Codeine Nausea And Vomiting    Morphine Nausea And Vomiting and Rash       VITAL SIGNS: BP (!) 143/81   Pulse 63   Ht 5' 6" (1.676 m)   Wt 86.2 kg (190 lb)   BMI 30.67 kg/m²      PHYSICAL EXAMINATION    General:  The patient is alert and oriented x 3.  Mood is pleasant.  Observation of ears, eyes and nose reveal no gross abnormalities.  HEENT: NCAT, sclera " nonicteric  Lungs: Respirations are equal and unlabored.    right  KNEE EXAMINATION     OBSERVATION / INSPECTION   Gait:   Nonantalgic   Alignment:  Neutral   Scars:   None   Muscle atrophy: Mild  Effusion:  Mild  Warmth:  None   Discoloration:   none     Palpable baker's cyst of popliteal fossa area that mildly tender.     TENDERNESS / CREPITUS (T / C):          T / C      T / C   Patella   - / -   Lateral joint line   + / -      Peripatellar medial  -  Medial joint line    +/ -   Peripatellar lateral -  Medial plica   - / -   Patellar tendon -   Popliteal fossa  + / -   Quad tendon   -   Gastrocnemius   + proximal   Prepatellar Bursa - / -   Quadricep   -   Tibial tubercle  -  Thigh/hamstring  -   Pes anserine/HS -  Fibula    -   ITB   - / -  Tibia     -   Tib/fib joint  - / -  LCL    -     MFC   - / -   MCL: Proximal  -    LFC   - / -    Distal   -          ROM: (* = pain)  PASSIVE   ACTIVE    Left :   5 / 0 / 145   5 / 0 / 145     Right :     / 0 / 130*    / 0 / 120    Patellofemoral examination:  See above noted areas of tenderness.   Patella position    Subluxation / dislocation: Centered           Sup. / Inf;   Normal   Crepitus (PF):    Absent   Patellar Mobility:       Medial-lateral:   Normal    Superior-inferior:  Normal    Inferior tilt   Normal    Patellar tendon:  Normal   Lateral tilt:    Normal   J-sign:     None   Patellofemoral grind:   neg      MENISCAL SIGNS:     Pain on terminal extension:  +  Pain on terminal flexion:  +  Johnnys maneuver:  -  Squat     NT    LIGAMENT EXAMINATION:  ACL / Lachman:  normal (-1 to 2mm)    PCL-Post.  drawer: normal 0 to 2mm  MCL- Valgus:  normal 0 to 2mm  LCL- Varus:  normal 0 to 2mm    STRENGTH: (* = with pain) PAINFUL SIDE   Quadricep   5/5   Hamstrin/5    EXTREMITY NEURO-VASCULAR EXAMINATION:   Sensation:  Grossly intact to light touch all dermatomal regions.   Motor Function:  Fully intact motor function at hip, knee, foot and ankle    DTRs;   quadriceps and  achilles 2+.  No clonus and downgoing Babinski.    Vascular status:  DP and PT pulses 2+, brisk capillary refill, symmetric.     Other Findings:  + step down bilat  + bridge test    Negative nancy's sign.      Xrays:  Xrays of the bilateral knees with flexion were ordered and reviewed by me today. No fracture, subluxation. Mild tricompartmental right knee osteoarthritis with moderate DJD of her lateral compartment and severe joint space narrowing of lateral compartment with flexion.      ASSESSMENT:    1. Right Knee pain, chronic  2. Right knee osteoarthritis   3. Right large popliteal baker's cyst  Bilateral hip abd/core weakness    PLAN:    1. She has a large baker's cyst of the popliteal knee area that measures 12x6x2 and I do feel she may benefit from aspiration of the baker's cyst and knee joint with CSI. I will send her to Dr. Marinelli or Ann-Marie Montes to have this accomplished. Can f/u with me or them after as needed.     2. PT order placed to start after drainage and injection  3. Ice compresses prn pain  4. She cannot take NSAIDs she states due to them causing her blood to thin.   5. Tylenol, ice compresses and heat compresses as needed.     I do not suspect DVT at this time.      All questions were answered, pt will contact us for questions or concerns in the interim.     I made the decision to obtain old records of the patient including previous notes and imaging. New imaging was ordered today of the extremity or extremities evaluated. I independently reviewed and interpreted the radiographs and/or MRIs today as well as prior imaging.

## 2024-10-21 NOTE — PROGRESS NOTES
CC: right knee pain    69 y.o. Female presents today for evaluation of her right knee pain. Localized to the anterior and posterior aspect of the knee. She denies any inciting incidence or trauma. Pain does not wake her from sleep.  How long: Approximately 4 months of right knee pain and swelling  What makes it better: Pain is better with rest  What makes it worse: Walking, ascending and descending stairs  Does it radiate: Radiates into the mid calf  Attempted treatments: Hyaluronic acid injections, Tylenol, Voltaren gel  Pain score: 6-8/10 with activity, 0/10 at rest  Any mechanical symptoms: Denies  Feelings of instability: Denies  Affect on ADLs: Yes    Occupation: clinical psychologist       PAST MEDICAL HISTORY:   Past Medical History:   Diagnosis Date    GERD (gastroesophageal reflux disease)     Hypertension     Therapy        PAST SURGICAL HISTORY:   Past Surgical History:   Procedure Laterality Date    ADENOIDECTOMY      APPENDECTOMY      breast reduction       SECTION      HYSTERECTOMY      LAMINECTOMY      L4 and 5    TONSILLECTOMY         FAMILY HISTORY:   Family History   Problem Relation Name Age of Onset    Allergies Son      Allergic rhinitis Son      Allergic rhinitis Daughter      Allergies Daughter      Eczema Daughter      Hypertension Mother      Stroke Father      Cancer Father      Hypertension Father      Angioedema Neg Hx      Asthma Neg Hx      Immunodeficiency Neg Hx         SOCIAL HISTORY:   Social History     Socioeconomic History    Marital status:      Spouse name: Nakul    Number of children: 2   Tobacco Use    Smoking status: Never    Smokeless tobacco: Never   Substance and Sexual Activity    Alcohol use: Yes     Alcohol/week: 1.0 standard drink of alcohol     Types: 1 Glasses of wine per week     Comment: beer, none today    Drug use: No   Social History Narrative    [employer] (30+ years)    2 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    psychologist     Social Drivers of Health     Financial  Resource Strain: Medium Risk (10/21/2024)    Overall Financial Resource Strain (CARDIA)     Difficulty of Paying Living Expenses: Somewhat hard   Food Insecurity: No Food Insecurity (10/21/2024)    Hunger Vital Sign     Worried About Running Out of Food in the Last Year: Never true     Ran Out of Food in the Last Year: Never true    Received from [facility]    PRAPARE - Transportation   Physical Activity: Insufficiently Active (10/21/2024)    Exercise Vital Sign     Days of Exercise per Week: 2 days     Minutes of Exercise per Session: 20 min   Stress: No Stress Concern Present (10/21/2024)    [employer] [employer] - Occupational Stress Questionnaire     Feeling of Stress : Only a little   Housing Stability: Unknown (10/21/2024)    Housing Stability Vital Sign     Unable to Pay for Housing in the Last Year: No       MEDICATIONS:     Current Outpatient Medications:     acetaminophen (TYLENOL) 500 MG tablet, Take 1,000 mg by mouth every 6 (six) hours as needed for Pain., Disp: , Rfl:     ammonium lactate 12 % Crea, Apply 1 g topically Daily., Disp: 140 g, Rfl: 1    econazole nitrate 1 % cream, , Disp: , Rfl:     estradiol (ESTRACE) 1 MG tablet, , Disp: , Rfl:     loratadine (CLARITIN) 10 mg tablet, Take 10 mg by mouth once daily., Disp: , Rfl:     methocarbamoL (ROBAXIN) 750 MG Tab, , Disp: , Rfl:     nystatin (MYCOSTATIN) cream, APPLY TO AFFECTED AREA TWICE A DAY IF NEEDED FOR ITCHING, Disp: , Rfl:     omeprazole (PRILOSEC) 10 MG capsule, Take 10 mg by mouth once daily., Disp: , Rfl:     triamterene-hydrochlorothiazide 37.5-25 mg (DYAZIDE) 37.5-25 mg per capsule, Take 1 capsule by mouth every morning., Disp: , Rfl:     hydroCHLOROthiazide (HYDRODIURIL) 25 MG tablet, Take 1 tablet by mouth once daily., Disp: , Rfl:     naproxen (NAPROSYN) 500 MG tablet, Take 500 mg by mouth 2 (two) times daily as needed. (Patient not taking: Reported on 10/16/2024), Disp: , Rfl:     ALLERGIES:   Review of patient's  "allergies indicates:   Allergen Reactions    Bactrim [sulfamethoxazole-trimethoprim] Other (See Comments)     Severe headaches. crystaline baldder    Demerol [meperidine] Nausea And Vomiting    Sulfa (sulfonamide antibiotics) Hives    Codeine Nausea And Vomiting    Morphine Nausea And Vomiting and Rash        PHYSICAL EXAMINATION:  /81   Pulse 64   Ht 5' 6" (1.676 m)   Wt 85.9 kg (189 lb 4.2 oz)   BMI 30.55 kg/m²   Vitals signs and nursing note have been reviewed.  General: In no acute distress, well developed, well nourished, no diaphoresis  Eyes: EOM full and smooth, no eye redness or discharge  HENT: normocephalic and atraumatic, neck supple, trachea midline, no nasal discharge, no external ear redness or discharge  Cardiovascular: 2+ and symmetric DP pulses bilaterally, no LE edema  Lungs: respirations non-labored, no conversational dyspnea   Abd: non-distended, no rigidity  MSK: no amputation or deformity, no swelling of extremities  Neuro: AAOx3, CN2-12 grossly intact  Skin: No rashes, warm and dry  Psychiatric: cooperative, pleasant, mood and affect appropriate for age    MUSCULOSKELETAL EXAM:    RIGHT KNEE EXAMINATION   Affected side is compared to contralateral knee     Observation:  No erythema or ecchymosis noted.  Right knee effusion noted (suprapatellar, popliteal, and into medial right calf)  No muscle atrophy of the thighs and calves noted.  No obvious bony deformities noted.   No genu valgus/varum noted. No recurvatum noted.    No tibial internal/external torsion.    No pes planus/cavus.    Tenderness:   Patella - none    Lateral joint line - TTP  Quad tendon - none   Medial joint line - TTP  Patellar tendon - none  Medial plica - none  Tibial tubercle - none   Lateral plica - none  Pes anserine - none   MCL prox - none  Distal ITB - none   MCL distal -TTP  MFC - none    LCL prox - none  LFC - none    LCL distal - TTP  Tibia - none                   Fibula - none    Palpable popliteal cyst " of the left knee          ROM:  Active extension to 0° on left without hyperextension, lag, crepitus, or patellar J sign.   Active extension to 0° on right without hyperextension, lag, crepitus, or patellar J sign.   Active flexion to 135° on left and 135° on right.    Strength: (bilaterally) (*pain)  Knee Flexion - 5/5 on left and 5/5 on right  Knee Extension - 5/5 on left and 5/5 on right  Hip Flexion - 5/5 on left and 5/5 on right  Hip Extension - 5/5 on left and 5/5 on right  Ankle dorsiflexion - 5/5 on left and 5/5 on right  Ankle Plantarflexion - 5/5 on left and 5/5 on right    Patellofemoral Exam:  Patellar ballottement - positive  Bulge sign - negative  Patellar grind - positive  No patellar laxity with medial and lateral translation   No apprehension with medial and lateral patellar translation.     Meniscus Testing:     No pain with terminal extension and flexion.  Johnnys test - negative   Bounce home test - negative    Ligament Testing:  Lachman's test - negative  No laxity with anterior drawer.  No laxity with posterior drawer.    No posterior sag sign.   No laxity with varus testing at 0 and 30 degrees.  No laxity with valgus testing at 0 and 30 degrees.    Neurovascular Examination:   Normal gait without antalgia.  Sensation intact to light touch in the obturator, lateral/intermediate/medial/posterior femoral cutaneous, saphenous, and common peroneal nerves bilaterally.  Pulses intact at the DP and PT arteries bilaterally.    Capillary refill intact <2 seconds in all toes bilaterally.      IMAGIN. X-ray obtained 10/16/2024 due to right knee pain   2. X-ray images were reviewed personally by me and then directly with patient.  3. FINDINGS: X-ray images obtained demonstrate mild moderate DJD right lateral compartment with subcortical lucent sclerotic change lateral condyle epiphysis articular surface, spur would remote cleavage plane medial tibial plateau spinous process. Less prominent DJD  "right patellofemoral joint with mild size suprapatellar joint effusion. Low-grade sclerotic opacity distal marrow space right femoral shaft, possible bone infarction. Tiny subcutaneous calcification left pretibial, asymmetrical subcutaneous edema left pretibial, spur with remote cleavage plane superior left patella. Foreign body posterior left tibial plateau above tibia-fibular joint.   4. IMPRESSION: As above.      Large Joint Aspiration/Injection  Popliteal cyst, right     Performed by: CHANDRAKANT AVILA  Authorized by: CHANDRAKANT AVILA  Consent Done?: Yes (Verbal)  Indications: Pain and swelling from cyst  Site marked: The procedure site was marked   Timeout: Prior to procedure the correct patient, procedure, and site was verified      Location: Knee joint, right  Prep: Patient was prepped with Chlorhexidine and alcohol.  Ultrasound Guidance for needle placement: yes  Procedure: Ethyl Chloride spray was used prior to skin puncture. After cold spray was applied, 2-4 cc's of 0.2% ropivacaine was injected into the skin and superficial tissue at the injection site using a 25G, 1.5" needle to form an anesthetic tunnel and ensure proper placement of the needle into the cyst. After local anesthetic was applied an 18G, 1.5 needle was used to enter the cyst under US guidance.  10 cc of sanguinous fluid was then aspirated with noted improvement in symptoms after the procedure.  Approach: posterior  Medications: 0.2% ropivacaine  Patient tolerance: Patient tolerated the procedure well with no immediate complications  Dressing: Band-aid was placed over injection site following the procedure and a compression dressing was placed around knee.      Ultrasound guidance was used for needle localization with SonSeculertte Edge 2, 15-6 MHz (L)probe(s). Images were saved and stored for documentation. Short and long axis images of the anterior knee were taken prior to injection confirming presence of joint effusion. The knee joint well " visualized. Dynamic visualization of the needles was continuous throughout the procedure and maintained good position and correct needle placement.      ASSESSMENT:      ICD-10-CM ICD-9-CM   1. Right knee pain, unspecified chronicity  M25.561 719.46   2. Popliteal cyst, right  M71.21 727.51         PLAN:  1-2. Right knee pain, popliteal cyst -     - Verena presents today for assessment of her posterior knee swelling and consideration for US guided aspiration/injection. She is referred by Chet Fernandez PA-C.    - XRs obtained 10/16/2024 and images were personally reviewed with the patient. See above for further detail.    - We reviewed the natural history of osteoarthritis and a multipronged treatment approach. We reviewed the importance of addressing three different aspects to best manage this condition. Controlling the intra-articular immune reaction through medications and/or injections, improving local stability through bracing and/or injection, and improving functional stability through hip, core, and ankle strength, stability and mobility which may benefit from formal physical therapy.    - After discussing options she elects to proceed with ultrasound guided Baker's cyst aspiration. See above for procedure detail.     - Ultrasound-guided aspiration of popliteal cyst performed in clinic today. Due to sanguinous appearance of cyst fluid and difficulty with complete aspiration of cyst, opted to avoid corticosteroid injection at this time until further evaluation is complete.    - Synovial fluid labs ordered including white count & diff, crystal analysis, culture including fungal, aerobic and anaerobic, and gram stain.      Future planning includes - pending fluid analysis results can consider repeat aspiration and/or corticosteroid injection.     All questions were answered to the best of my ability and all concerns were addressed at this time.    Follow up in 1 weeks for above, or sooner if needed.      This  note is dictated using the M*Modal Fluency Direct word recognition program. There are word recognition mistakes that are occasionally missed on review.      Total time spent face-to face with patient counseling or coordinating care including prognosis, differential diagnosis, risks and benefits of treatment, instructions, compliance risk reductions as well as non-face-to-face time personally spent reviewing medial record, medical documentation, and coordination of care.     EST MINUTES X   [id number] 10-19    57745 20-29    00369 30-39    [id number] 40-54 41   NEW     [id number] 15-29    62009 30-44    70496 45-59    27545 60-74    PHONE      5-10    08881 11-20    91007 21-30

## 2024-10-22 ENCOUNTER — OFFICE VISIT (OUTPATIENT)
Dept: SPORTS MEDICINE | Facility: CLINIC | Age: 69
End: 2024-10-22
Payer: MEDICARE

## 2024-10-22 VITALS
WEIGHT: 189.25 LBS | BODY MASS INDEX: 30.41 KG/M2 | HEIGHT: 66 IN | HEART RATE: 64 BPM | DIASTOLIC BLOOD PRESSURE: 81 MMHG | SYSTOLIC BLOOD PRESSURE: 133 MMHG

## 2024-10-22 DIAGNOSIS — M71.21 POPLITEAL CYST, RIGHT: ICD-10-CM

## 2024-10-22 DIAGNOSIS — M25.561 RIGHT KNEE PAIN, UNSPECIFIED CHRONICITY: Primary | ICD-10-CM

## 2024-10-22 LAB
BODY FLD TYPE: NORMAL
CRYSTALS FLD MICRO: NEGATIVE

## 2024-10-22 PROCEDURE — 99999 PR PBB SHADOW E&M-EST. PATIENT-LVL III: CPT | Mod: PBBFAC,,, | Performed by: ORTHOPAEDIC SURGERY

## 2024-10-22 PROCEDURE — 89060 EXAM SYNOVIAL FLUID CRYSTALS: CPT | Performed by: ORTHOPAEDIC SURGERY

## 2024-10-22 PROCEDURE — 87075 CULTR BACTERIA EXCEPT BLOOD: CPT | Performed by: ORTHOPAEDIC SURGERY

## 2024-10-22 PROCEDURE — 20611 DRAIN/INJ JOINT/BURSA W/US: CPT | Mod: S$PBB,RT,, | Performed by: ORTHOPAEDIC SURGERY

## 2024-10-22 PROCEDURE — 20611 DRAIN/INJ JOINT/BURSA W/US: CPT | Mod: PBBFAC,RT | Performed by: ORTHOPAEDIC SURGERY

## 2024-10-22 PROCEDURE — 87205 SMEAR GRAM STAIN: CPT | Performed by: ORTHOPAEDIC SURGERY

## 2024-10-22 PROCEDURE — 99215 OFFICE O/P EST HI 40 MIN: CPT | Mod: 25,S$PBB,, | Performed by: ORTHOPAEDIC SURGERY

## 2024-10-22 PROCEDURE — 87070 CULTURE OTHR SPECIMN AEROBIC: CPT | Performed by: ORTHOPAEDIC SURGERY

## 2024-10-22 PROCEDURE — 89051 BODY FLUID CELL COUNT: CPT | Performed by: ORTHOPAEDIC SURGERY

## 2024-10-22 PROCEDURE — 99213 OFFICE O/P EST LOW 20 MIN: CPT | Mod: PBBFAC | Performed by: ORTHOPAEDIC SURGERY

## 2024-10-22 PROCEDURE — 87102 FUNGUS ISOLATION CULTURE: CPT | Performed by: ORTHOPAEDIC SURGERY

## 2024-10-22 RX ORDER — TRIAMCINOLONE ACETONIDE 40 MG/ML
40 INJECTION, SUSPENSION INTRA-ARTICULAR; INTRAMUSCULAR
Status: CANCELLED | OUTPATIENT
Start: 2024-10-22

## 2024-10-23 LAB
APPEARANCE FLD: NORMAL
BODY FLD TYPE: NORMAL
COLOR FLD: NORMAL
GRAM STN SPEC: NORMAL
GRAM STN SPEC: NORMAL
LYMPHOCYTES NFR FLD MANUAL: 78 %
MONOS+MACROS NFR FLD MANUAL: 7 %
NEUTROPHILS NFR FLD MANUAL: 15 %
PATH INTERP FLD-IMP: NORMAL
WBC # FLD: 183 /CU MM

## 2024-10-25 LAB — BACTERIA SPEC ANAEROBE CULT: NORMAL

## 2024-10-26 LAB — BACTERIA SPEC AEROBE CULT: NO GROWTH

## 2024-10-28 ENCOUNTER — CLINICAL SUPPORT (OUTPATIENT)
Dept: REHABILITATION | Facility: HOSPITAL | Age: 69
End: 2024-10-28
Payer: MEDICARE

## 2024-10-28 ENCOUNTER — TELEPHONE (OUTPATIENT)
Dept: SPORTS MEDICINE | Facility: CLINIC | Age: 69
End: 2024-10-28
Payer: MEDICARE

## 2024-10-28 DIAGNOSIS — M71.21 BAKER'S CYST OF KNEE, RIGHT: ICD-10-CM

## 2024-10-28 DIAGNOSIS — M25.561 RIGHT KNEE PAIN, UNSPECIFIED CHRONICITY: ICD-10-CM

## 2024-10-28 DIAGNOSIS — M17.11 OSTEOARTHRITIS OF RIGHT KNEE, UNSPECIFIED OSTEOARTHRITIS TYPE: ICD-10-CM

## 2024-10-28 PROCEDURE — 97161 PT EVAL LOW COMPLEX 20 MIN: CPT

## 2024-10-28 PROCEDURE — 97112 NEUROMUSCULAR REEDUCATION: CPT

## 2024-10-29 ENCOUNTER — OFFICE VISIT (OUTPATIENT)
Dept: SPORTS MEDICINE | Facility: CLINIC | Age: 69
End: 2024-10-29
Payer: MEDICARE

## 2024-10-29 VITALS
BODY MASS INDEX: 30.69 KG/M2 | HEART RATE: 60 BPM | WEIGHT: 190.94 LBS | HEIGHT: 66 IN | DIASTOLIC BLOOD PRESSURE: 76 MMHG | SYSTOLIC BLOOD PRESSURE: 127 MMHG

## 2024-10-29 DIAGNOSIS — M25.561 CHRONIC PAIN OF RIGHT KNEE: Primary | ICD-10-CM

## 2024-10-29 DIAGNOSIS — M71.21 POPLITEAL CYST, RIGHT: ICD-10-CM

## 2024-10-29 DIAGNOSIS — M17.11 PRIMARY OSTEOARTHRITIS OF RIGHT KNEE: ICD-10-CM

## 2024-10-29 DIAGNOSIS — G89.29 CHRONIC PAIN OF RIGHT KNEE: Primary | ICD-10-CM

## 2024-10-29 DIAGNOSIS — M25.461 EFFUSION, RIGHT KNEE: ICD-10-CM

## 2024-10-29 PROCEDURE — 99213 OFFICE O/P EST LOW 20 MIN: CPT | Mod: PBBFAC,25 | Performed by: ORTHOPAEDIC SURGERY

## 2024-10-29 PROCEDURE — 99999 PR PBB SHADOW E&M-EST. PATIENT-LVL III: CPT | Mod: PBBFAC,,, | Performed by: ORTHOPAEDIC SURGERY

## 2024-10-29 PROCEDURE — 20611 DRAIN/INJ JOINT/BURSA W/US: CPT | Mod: PBBFAC | Performed by: ORTHOPAEDIC SURGERY

## 2024-10-29 PROCEDURE — 99999PBSHW PR PBB SHADOW TECHNICAL ONLY FILED TO HB: Mod: PBBFAC,,,

## 2024-10-29 RX ORDER — TRIAMCINOLONE ACETONIDE 40 MG/ML
40 INJECTION, SUSPENSION INTRA-ARTICULAR; INTRAMUSCULAR
Status: COMPLETED | OUTPATIENT
Start: 2024-10-29 | End: 2024-10-29

## 2024-10-29 RX ADMIN — TRIAMCINOLONE ACETONIDE 40 MG: 40 INJECTION, SUSPENSION INTRA-ARTICULAR; INTRAMUSCULAR at 03:10

## 2024-10-30 ENCOUNTER — CLINICAL SUPPORT (OUTPATIENT)
Dept: REHABILITATION | Facility: HOSPITAL | Age: 69
End: 2024-10-30
Payer: MEDICARE

## 2024-10-30 DIAGNOSIS — M25.561 RIGHT KNEE PAIN, UNSPECIFIED CHRONICITY: Primary | ICD-10-CM

## 2024-10-30 PROCEDURE — 97530 THERAPEUTIC ACTIVITIES: CPT | Mod: KX

## 2024-10-30 PROCEDURE — 97112 NEUROMUSCULAR REEDUCATION: CPT | Mod: KX

## 2024-11-06 ENCOUNTER — CLINICAL SUPPORT (OUTPATIENT)
Dept: REHABILITATION | Facility: HOSPITAL | Age: 69
End: 2024-11-06
Payer: MEDICARE

## 2024-11-06 ENCOUNTER — OFFICE VISIT (OUTPATIENT)
Dept: PSYCHIATRY | Facility: CLINIC | Age: 69
End: 2024-11-06
Payer: MEDICARE

## 2024-11-06 ENCOUNTER — PATIENT MESSAGE (OUTPATIENT)
Dept: PSYCHIATRY | Facility: CLINIC | Age: 69
End: 2024-11-06
Payer: MEDICARE

## 2024-11-06 DIAGNOSIS — M25.561 RIGHT KNEE PAIN, UNSPECIFIED CHRONICITY: Primary | ICD-10-CM

## 2024-11-06 DIAGNOSIS — F43.22 ADJUSTMENT DISORDER WITH ANXIOUS MOOD: Primary | ICD-10-CM

## 2024-11-06 PROCEDURE — 99212 OFFICE O/P EST SF 10 MIN: CPT | Mod: PBBFAC | Performed by: PSYCHOLOGIST

## 2024-11-06 PROCEDURE — 90834 PSYTX W PT 45 MINUTES: CPT | Mod: ,,, | Performed by: PSYCHOLOGIST

## 2024-11-06 PROCEDURE — 99999 PR PBB SHADOW E&M-EST. PATIENT-LVL II: CPT | Mod: PBBFAC,,, | Performed by: PSYCHOLOGIST

## 2024-11-06 PROCEDURE — 97530 THERAPEUTIC ACTIVITIES: CPT

## 2024-11-06 PROCEDURE — 97112 NEUROMUSCULAR REEDUCATION: CPT

## 2024-11-06 NOTE — PROGRESS NOTES
[facility]   Physical Therapy Treatment Note      Name: Verena Toscano  Clinic Number: [id number]    Therapy Diagnosis:   Encounter Diagnosis   Name Primary?    Right knee pain, unspecified chronicity Yes     Physician: Navi Fernandez III, *    Visit Date: 11/6/2024    Physician Orders: PT Eval and Treat   Medical Diagnosis from Referral: M71.21 (ICD-10-CM) - Synovial cyst of popliteal space (Baker), right knee M17.11 (ICD-10-CM) - Unilateral primary osteoarthritis, right knee M25.561 (ICD-10-CM) - Pain in right knee  Evaluation Date: 10/28/2024  Authorization Period Expiration: 10/16/2025  Plan of Care Expiration: 01/31/2025  Progress Note Due: 10th visit  Date of Surgery: N/A  Visit # / Visits authorized: 2/20 + eval  FOTO: 1/ 3     Precautions: Standard      Time In: 15:38  Time Out: 16:30  Total Billable Time: 52 minutes    PTA Visit #: 0/5       Subjective     Patient reports: pain in right knee comes and goes. She says her knee does not feel as good as it did right after knee injection.   She was compliant with home exercise program.  Response to previous treatment: No adverse response to exercise.  Functional change: Ongoing    Pain: 4/10  Location: right knee      Objective      Objective Measures updated at progress report unless specified.     Treatment     Verena received the treatments listed below:      therapeutic exercises to develop strength, endurance, ROM, flexibility, posture, and core stabilization for 00 minutes including:      manual therapy techniques: Joint mobilizations were applied to the: right glenohumeral joint for 00 minutes, including:      neuromuscular re-education activities to improve: Balance, Coordination, Kinesthetic, Proprioception, Posture, and Motor Control for 42 minutes. The following activities were included:  -long arc     -long arc quads x30, 5 second hold (4 lb AW on right, 2 lb AW on left)  -Straight leg raises 2x8 each leg  -short  arc quads x30, 5 second hold  -SL balance with handheld assistance 3x30 each leg    NP:  -heel prop x3 min  -quad sets x30, 5 second hold    therapeutic activities to improve functional performance for 08 minutes, including:  -mini squats with handheld assistance 2x7    Patient Education and Home Exercises       Education provided:   - home exercise program progression and technique  - involved anatomical structures  - prognosis and plan of care  - pain management strategies    Written Home Exercises Provided: Pt instructed to continue prior HEP. Exercises were reviewed and Verena was able to demonstrate them prior to the end of the session.  Verena demonstrated good  understanding of the education provided. See Electronic Medical Record under Patient Instructions for exercises provided during therapy sessions    Assessment     Verena presented to physical therapy today with reports of right knee pain occasionally. She had tenderness to palpation along the posterior aspect of her lower right leg and on the anterior medial aspect of her left lower leg. She demonstrates decreased swelling and improved range of motion in right knee. Still desmontrates decreased quad strength. She tolerated today's session well with improve activity tolerance. Continue to progress as tolerated.     Verena Is progressing well towards her goals.   Patient prognosis is Good.     Patient will continue to benefit from skilled outpatient physical therapy to address the deficits listed in the problem list box on initial evaluation, provide pt/family education and to maximize pt's level of independence in the home and community environment.     Patient's spiritual, cultural and educational needs considered and pt agreeable to plan of care and goals.     Anticipated barriers to physical therapy: Work schedule    Goals:  Short Term Goals: 4 weeks   1. Patient will reduce maximal pain rating to < 3/10 pain to facilitate ability to  sleep through the night and recover from PT interventions.  2. Patient will be able to ambulate for at least 10 minutes with < 3/10 pain to improve walking tolerance.   3. Patient will improve knee range of motion to at least 135 degrees for improved functional performance.      Long Term Goals: 8-10 weeks   1. Patient will be able to complete the TUG in </= 10 seconds with the least restrictive assistive device to decrease fall risk.   2. Patient will demonstrate >/= 4/5 lower quarter strength to facilitate transfers from sit to stand from various surfaces without restriction.  3. Patient will improve 30 second sit to stand to at least 10x for improvement with transfer tasks.      Plan     Plan of care Certification: 10/28/2024 to 01/31/2025.     Outpatient Physical Therapy 2 times weekly for 10 weeks to include the following interventions: Aquatic Therapy, Gait Training, Manual Therapy, Moist Heat/ Ice, Neuromuscular Re-ed, Patient Education, Self Care, Therapeutic Activities, and Therapeutic Exercise.        Lois Hebert, PT, DPT

## 2024-11-07 NOTE — PROGRESS NOTES
PSYCHO-ONCOLOGY NOTE/ Individual Psychotherapy       Date: 11/6/2024   Site of therapist:  [facility]          Therapeutic Intervention: Met with patient.  Outpatient - Behavior modifying psychotherapy 45 min - CPT code 68731  The patient's last visit with me was on 10/16/2024.    Problem list  Patient Active Problem List   Diagnosis    Cough    Cognitive complaints    Adjustment disorder with anxious mood    Cervical vertigo    Dyslipidemia    Essential hypertension    Hormone replacement therapy    Intertrigo    Neck pain    Vitamin D deficiency    Vulvitis    Caregiver stress    Right knee pain       Chief complaint/reason for encounter: anxiety   Met with patient to evaluate psychosocial adaptation to caregiving    Current Medications  Current Outpatient Medications   Medication    acetaminophen (TYLENOL) 500 MG tablet    ammonium lactate 12 % Crea    econazole nitrate 1 % cream    estradiol (ESTRACE) 1 MG tablet    hydroCHLOROthiazide (HYDRODIURIL) 25 MG tablet    loratadine (CLARITIN) 10 mg tablet    methocarbamoL (ROBAXIN) 750 MG Tab    naproxen (NAPROSYN) 500 MG tablet    nystatin (MYCOSTATIN) cream    omeprazole (PRILOSEC) 10 MG capsule    triamterene-hydrochlorothiazide 37.5-25 mg (DYAZIDE) 37.5-25 mg per capsule     No current facility-administered medications for this visit.       Objective:  Verena Toscano arrived promptly for the session.  Ms. Toscano was independently ambulatory at the time of session. The patient was fully cooperative throughout the session.  Appearance: age appropriate, casually  dressed, well groomed  Behavior/Cooperation: friendly and cooperative  Speech: normal in rate, volume, and tone and appropriate quality, quantity and organization of sentences  Mood: euthymic  Affect: mildly tearful  Thought Process: goal-directed, logical  Thought Content: normal,  No delusions or paranoia; did not appear to be responding to internal stimuli during the session  Orientation:  grossly intact  Memory: Grossly intact  Attention Span/Concentration: Attends to session without distraction; reports no difficulty  Fund of Knowledge: average  Estimate of Intelligence: average from verbal skills and history  Cognition: grossly intact  Insight: patient has awareness of illness; good insight into own behavior and behavior of others  Judgment: the patient's behavior is adequate to circumstances    Interval history and content of current session: Patient discussed events and activities since the time of last visit. She discussed her own health challenges and her emotional  reaction to physical limitations.       Prior: discussion of sexual changes: Enjoyable and fulfilling sex life prior to 's illness. Historical use of Viagra, Cialis. 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 Since Padcev, his ability to have an erection (or ejaculate without an erection) is almost gone. She feels his interest is low (although they still cuddle and show other physical affection). She wonders about his thinking about their change in interaction and whether he is interested in other potential assistive devices for intercourse. (Her drive has not changed, is comfortable masturbating, unsure how he thinks about her sexual interest).     Risk parameters:   Patient reports no suicidal ideation  Patient reports no homicidal ideation  Patient reports no self-injurious behavior  Patient reports no violent behavior   Safety needs:  None at this time      Verbal deficits: None     Patient's response to intervention:The patient's response to intervention is accepting, motivated.     Progress toward goals: Progress as Expected        Patient reported outcomes:      Distress thermometer:       11/6/2024     9:35 AM 10/15/2024    11:21 AM 9/17/2024     8:53 AM 9/4/2024     4:37 PM 8/20/2024     5:53 PM 8/14/2024     6:25 AM 7/24/2024     8:56  AM   DISTRESS SCREENING   Distress Score 6 3 7 4 9  9  4    Practical Concerns Taking care of myself;Taking care of others;Work;Finances Taking care of myself;Taking care of others;Work;Finances;Treatment decisions Taking care of myself;Taking care of others;Work;Finances;Treatment decisions  Taking care of myself;Taking care of others;Work;Finances  Taking care of myself;Taking care of others;Work;Finances  Taking care of myself;Finances    Social Concerns None of these None of these None of these  None of these  None of these  Relationship with children    Emotional Concerns Worry or anxiety;Grief or loss;Fear Worry or anxiety;Fear Worry or anxiety;Grief or loss;Feelings of worthlessness or being a burden  Worry or anxiety;Sadness or depression;Fear;Feelings of worthlessness or being a burden  Worry or anxiety;Sadness or depression;Fear  Worry or anxiety;Grief or loss    Spiritual or Confucianist Concerns None of these None of these None of these  None of these  None of these  None of these    Physical Concerns Pain;Fatigue;Loss or change of physical abilities Pain;Fatigue;Loss or change of physical abilities Pain;Fatigue;Loss or change of physical abilities  Pain;Sleep;Fatigue;Loss or change of physical abilities  Pain;Fatigue;Loss or change of physical abilities  Pain;Fatigue    Other Problems      's health, coping with challenges         Patient-reported           PHQ-9=PHQ ANSWERS    Q1. Little interest or pleasure in doing things: Several days (11/06/24 0936)  Q2. Feeling down, depressed, or hopeless: Several days (11/06/24 0936)  Q3. Trouble falling or staying asleep, or sleeping too much: Not at all (11/06/24 0936)  Q4. Feeling tired or having little energy: Several days (11/06/24 0936)  Q5. Poor appetite or overeating: Not at all (11/06/24 0936)  Q6. Feeling bad about yourself - or that you are a failure or have let yourself or your family down: Several days (11/06/24 0936)  Q7. Trouble concentrating  on things, such as reading the newspaper or watching television: Not at all (11/06/24 0936)  Q8. Moving or speaking so slowly that other people could have noticed. Or the opposite - being so fidgety or restless that you have been moving around a lot more than usual: Not at all (11/06/24 0936)  Q9.      PHQ8 Score : 4 (11/06/24 0936)  PHQ-9 Total Score: 4 (11/06/24 0936)  ; Initial visit: 7       DEANNA-7=5 Initial visit:6       11/6/2024     9:36 AM 10/15/2024    11:23 AM 9/17/2024     8:55 AM   GAD7   1. Feeling nervous, anxious, or on edge? 1 1 1   2. Not being able to stop or control worrying? 1 1 1   3. Worrying too much about different things? 1 1 1   4. Trouble relaxing? 0 1 1   5. Being so restless that it is hard to sit still? 0 2 0   6. Becoming easily annoyed or irritable? 1 1 2   7. Feeling afraid as if something awful might happen? 1 1 1   8. If you checked off any problems, how difficult have these problems made it for you to do your work, take care of things at home, or get along with other people? 1 1 1   DEANNA-7 Score 5 8 7          Client Strengths: verbal, intelligent, successful, good social support, good insight, commitment to wellness      Treatment Plan:individual psychotherapy  Target symptoms: adjustment  Why chosen therapy is appropriate versus another modality: relevant to diagnosis, patient responds to this modality, evidence based practice  Outcome monitoring methods: self-report, checklist/rating scale  Therapeutic intervention type: behavior modifying psychotherapy  Prognosis: Good    Goals from last visit: Met   Behavioral goals prior to next visit:    Exercise: check senior exercise   Stress management: change self talk     Breaks at work   Social engagement:    Nutrition:    Smoking Cessation:   Therapy:          Return to clinic: 2 weeks     Length of Service (minutes direct face-to-face contact): 45    Diagnosis:     ICD-10-CM ICD-9-CM   1. Adjustment disorder with anxious mood  F43.22  309.24         Xavier Núñez, PhD  [place] License #[id number]  [place] License #[id number]

## 2024-11-13 ENCOUNTER — CLINICAL SUPPORT (OUTPATIENT)
Dept: REHABILITATION | Facility: HOSPITAL | Age: 69
End: 2024-11-13
Payer: MEDICARE

## 2024-11-13 DIAGNOSIS — M25.561 RIGHT KNEE PAIN, UNSPECIFIED CHRONICITY: Primary | ICD-10-CM

## 2024-11-13 PROCEDURE — 97112 NEUROMUSCULAR REEDUCATION: CPT | Mod: KX

## 2024-11-13 PROCEDURE — 97530 THERAPEUTIC ACTIVITIES: CPT | Mod: KX

## 2024-11-13 NOTE — PROGRESS NOTES
[facility]   Physical Therapy Treatment Note      Name: Vernea Toscano  Clinic Number: [id number]    Therapy Diagnosis:   Encounter Diagnosis   Name Primary?    Right knee pain, unspecified chronicity Yes     Physician: Navi Fernandez III, *    Visit Date: 11/13/2024    Physician Orders: PT Eval and Treat   Medical Diagnosis from Referral: M71.21 (ICD-10-CM) - Synovial cyst of popliteal space (Baker), right knee M17.11 (ICD-10-CM) - Unilateral primary osteoarthritis, right knee M25.561 (ICD-10-CM) - Pain in right knee  Evaluation Date: 10/28/2024  Authorization Period Expiration: 10/16/2025  Plan of Care Expiration: 01/31/2025  Progress Note Due: 10th visit  Date of Surgery: N/A  Visit # / Visits authorized: 3/20 + eval  FOTO: 1/ 3     Precautions: Standard      Time In: 15:32  Time Out: 16:28  Total Billable Time: 56 minutes    PTA Visit #: 0/5       Subjective     Patient reports: mild pain in right medial thigh when performing long arc quads. She reports general sensitivity in her body with light touch.   She was compliant with home exercise program.  Response to previous treatment: No adverse response to exercise.  Functional change: Ongoing    Pain: 4/10  Location: right knee      Objective      Objective Measures updated at progress report unless specified.     Treatment     Verena received the treatments listed below:      therapeutic exercises to develop strength, endurance, ROM, flexibility, posture, and core stabilization for 00 minutes including:      manual therapy techniques: Joint mobilizations were applied to the: right glenohumeral joint for 00 minutes, including:      neuromuscular re-education activities to improve: Balance, Coordination, Kinesthetic, Proprioception, Posture, and Motor Control for 44 minutes. The following activities were included:  -long arc quads x30, 5 second hold (4 lb AW on right, 2 lb AW on left)  -Straight leg raises 2x10 each leg  -short  arc quads x30, 5 second hold (4 lb AW on right, 2 lb AW on left)  -SL balance with handheld assistance 5x30 each leg (Airex)  -DL glute bridges 2x10    NP:  -heel prop x3 min  -quad sets x30, 5 second hold    therapeutic activities to improve functional performance for 12 minutes, including:  -NuStep x5 minutes, Level 1.5  -Sit to stand 2x10    NP  -mini squats with handheld assistance 2x7    Patient Education and Home Exercises       Education provided:   - home exercise program progression and technique  - involved anatomical structures  - prognosis and plan of care  - pain management strategies    Written Home Exercises Provided: Pt instructed to continue prior HEP. Exercises were reviewed and Verena was able to demonstrate them prior to the end of the session.  Verena demonstrated good  understanding of the education provided. See Electronic Medical Record under Patient Instructions for exercises provided during therapy sessions    Assessment     Verena presented to physical therapy today no new reports of pain. She tolerated today's session well with improved activity tolerance. She continues to demonstrate mild fear avoidance with some activities and occasional emotional lability. She reports that her swelling in her right leg has returned and that she wants to contact MD to see next best step. Continue to progress as tolerated.     Verena Is progressing well towards her goals.   Patient prognosis is Good.     Patient will continue to benefit from skilled outpatient physical therapy to address the deficits listed in the problem list box on initial evaluation, provide pt/family education and to maximize pt's level of independence in the home and community environment.     Patient's spiritual, cultural and educational needs considered and pt agreeable to plan of care and goals.     Anticipated barriers to physical therapy: Work schedule    Goals:  Short Term Goals: 4 weeks   1. Patient will reduce  maximal pain rating to < 3/10 pain to facilitate ability to sleep through the night and recover from PT interventions.  2. Patient will be able to ambulate for at least 10 minutes with < 3/10 pain to improve walking tolerance.   3. Patient will improve knee range of motion to at least 135 degrees for improved functional performance.      Long Term Goals: 8-10 weeks   1. Patient will be able to complete the TUG in </= 10 seconds with the least restrictive assistive device to decrease fall risk.   2. Patient will demonstrate >/= 4/5 lower quarter strength to facilitate transfers from sit to stand from various surfaces without restriction.  3. Patient will improve 30 second sit to stand to at least 10x for improvement with transfer tasks.      Plan     Plan of care Certification: 10/28/2024 to 01/31/2025.     Outpatient Physical Therapy 2 times weekly for 10 weeks to include the following interventions: Aquatic Therapy, Gait Training, Manual Therapy, Moist Heat/ Ice, Neuromuscular Re-ed, Patient Education, Self Care, Therapeutic Activities, and Therapeutic Exercise.        Lois Hebert, PT, DPT

## 2024-11-18 ENCOUNTER — CLINICAL SUPPORT (OUTPATIENT)
Dept: REHABILITATION | Facility: HOSPITAL | Age: 69
End: 2024-11-18
Payer: MEDICARE

## 2024-11-18 DIAGNOSIS — M25.561 RIGHT KNEE PAIN, UNSPECIFIED CHRONICITY: Primary | ICD-10-CM

## 2024-11-18 PROCEDURE — 97112 NEUROMUSCULAR REEDUCATION: CPT | Mod: KX

## 2024-11-18 PROCEDURE — 97530 THERAPEUTIC ACTIVITIES: CPT | Mod: KX

## 2024-11-20 ENCOUNTER — OFFICE VISIT (OUTPATIENT)
Dept: PSYCHIATRY | Facility: CLINIC | Age: 69
End: 2024-11-20
Payer: MEDICARE

## 2024-11-20 DIAGNOSIS — F43.22 ADJUSTMENT DISORDER WITH ANXIOUS MOOD: Primary | ICD-10-CM

## 2024-11-20 PROCEDURE — 90834 PSYTX W PT 45 MINUTES: CPT | Mod: ,,, | Performed by: PSYCHOLOGIST

## 2024-11-20 PROCEDURE — 99999 PR PBB SHADOW E&M-EST. PATIENT-LVL II: CPT | Mod: PBBFAC,,, | Performed by: PSYCHOLOGIST

## 2024-11-20 PROCEDURE — 99212 OFFICE O/P EST SF 10 MIN: CPT | Mod: PBBFAC | Performed by: PSYCHOLOGIST

## 2024-11-20 NOTE — PROGRESS NOTES
[facility]   Physical Therapy Treatment Note      Name: Verena Toscano  Clinic Number: [id number]    Therapy Diagnosis:   Encounter Diagnosis   Name Primary?    Right knee pain, unspecified chronicity Yes     Physician: Navi Fernandez III, *    Visit Date: 11/18/2024    Physician Orders: PT Eval and Treat   Medical Diagnosis from Referral: M71.21 (ICD-10-CM) - Synovial cyst of popliteal space (Baker), right knee M17.11 (ICD-10-CM) - Unilateral primary osteoarthritis, right knee M25.561 (ICD-10-CM) - Pain in right knee  Evaluation Date: 10/28/2024  Authorization Period Expiration: 10/16/2025  Plan of Care Expiration: 01/31/2025  Progress Note Due: 10th visit  Date of Surgery: N/A  Visit # / Visits authorized: 4/20 + eval  FOTO: 1/ 3     Precautions: Standard      Time In: 15:32  Time Out: 16:25  Total Billable Time: 30 minutes    PTA Visit #: 0/5       Subjective     Patient reports: some increased swelling in right knee. She reports not feeling confident in her balance and transfers.   She was compliant with home exercise program.  Response to previous treatment: No adverse response to exercise.  Functional change: Ongoing    Pain: 4/10  Location: right knee      Objective      Objective Measures updated at progress report unless specified.     Treatment     Verena received the treatments listed below:      therapeutic exercises to develop strength, endurance, ROM, flexibility, posture, and core stabilization for 00 minutes including:      manual therapy techniques: Joint mobilizations were applied to the: right glenohumeral joint for 00 minutes, including:      neuromuscular re-education activities to improve: Balance, Coordination, Kinesthetic, Proprioception, Posture, and Motor Control for 41 minutes. The following activities were included:  -long arc quads x30, 5 second hold (4 lb AW on right, 2 lb AW on left)  -lateral band walks x3 laps  -Dynamic marching over hurdles fwd  x5 laps    NP  -Straight leg raises 2x10 each leg  -short arc quads x30, 5 second hold (4 lb AW on right, 2 lb AW on left)  -SL balance with handheld assistance 5x30 each leg (Airex)  -DL glute bridges 2x10  -heel prop x3 min  -quad sets x30, 5 second hold    therapeutic activities to improve functional performance for 12 minutes, including:  -NuStep x5 minutes, Level 1.5  -DL shuttle press 2x10 (2 black)    NP  -mini squats with handheld assistance 2x7  -Sit to stand 2x10    Patient Education and Home Exercises       Education provided:   - home exercise program progression and technique  - involved anatomical structures  - prognosis and plan of care  - pain management strategies    Written Home Exercises Provided: Pt instructed to continue prior HEP. Exercises were reviewed and Verena was able to demonstrate them prior to the end of the session.  Verena demonstrated good  understanding of the education provided. See Electronic Medical Record under Patient Instructions for exercises provided during therapy sessions    Assessment     Verena presented to physical therapy today no new reports of pain. She tolerated today's session well with emphasis on functional balance and strength. She reports that her swelling in her right leg has returned and wants to discuss with MD upon next visit. Continue to progress as tolerated.     Verena Is progressing well towards her goals.   Patient prognosis is Good.     Patient will continue to benefit from skilled outpatient physical therapy to address the deficits listed in the problem list box on initial evaluation, provide pt/family education and to maximize pt's level of independence in the home and community environment.     Patient's spiritual, cultural and educational needs considered and pt agreeable to plan of care and goals.     Anticipated barriers to physical therapy: Work schedule    Goals:  Short Term Goals: 4 weeks   1. Patient will reduce maximal pain  rating to < 3/10 pain to facilitate ability to sleep through the night and recover from PT interventions.  2. Patient will be able to ambulate for at least 10 minutes with < 3/10 pain to improve walking tolerance.   3. Patient will improve knee range of motion to at least 135 degrees for improved functional performance.      Long Term Goals: 8-10 weeks   1. Patient will be able to complete the TUG in </= 10 seconds with the least restrictive assistive device to decrease fall risk.   2. Patient will demonstrate >/= 4/5 lower quarter strength to facilitate transfers from sit to stand from various surfaces without restriction.  3. Patient will improve 30 second sit to stand to at least 10x for improvement with transfer tasks.      Plan     Plan of care Certification: 10/28/2024 to 01/31/2025.     Outpatient Physical Therapy 2 times weekly for 10 weeks to include the following interventions: Aquatic Therapy, Gait Training, Manual Therapy, Moist Heat/ Ice, Neuromuscular Re-ed, Patient Education, Self Care, Therapeutic Activities, and Therapeutic Exercise.        Lois Hebert, PT, DPT

## 2024-11-20 NOTE — PROGRESS NOTES
PSYCHO-ONCOLOGY NOTE/ Individual Psychotherapy       Date: 11/20/2024   Site of therapist:  [facility]          Therapeutic Intervention: Met with patient.  Outpatient - Behavior modifying psychotherapy 45 min - CPT code 41015  The patient's last visit with me was on 11/6/2024.    Problem list  Patient Active Problem List   Diagnosis    Cough    Cognitive complaints    Adjustment disorder with anxious mood    Cervical vertigo    Dyslipidemia    Essential hypertension    Hormone replacement therapy    Intertrigo    Neck pain    Vitamin D deficiency    Vulvitis    Caregiver stress    Right knee pain       Chief complaint/reason for encounter: anxiety   Met with patient to evaluate psychosocial adaptation to caregiving    Current Medications  Current Outpatient Medications   Medication    acetaminophen (TYLENOL) 500 MG tablet    ammonium lactate 12 % Crea    econazole nitrate 1 % cream    estradiol (ESTRACE) 1 MG tablet    hydroCHLOROthiazide (HYDRODIURIL) 25 MG tablet    loratadine (CLARITIN) 10 mg tablet    methocarbamoL (ROBAXIN) 750 MG Tab    naproxen (NAPROSYN) 500 MG tablet    nystatin (MYCOSTATIN) cream    omeprazole (PRILOSEC) 10 MG capsule    triamterene-hydrochlorothiazide 37.5-25 mg (DYAZIDE) 37.5-25 mg per capsule     No current facility-administered medications for this visit.       Objective:  Verena Toscano arrived promptly for the session.  Ms. Toscano was independently ambulatory at the time of session. The patient was fully cooperative throughout the session.  Appearance: age appropriate, casually  dressed, well groomed  Behavior/Cooperation: friendly and cooperative  Speech: normal in rate, volume, and tone and appropriate quality, quantity and organization of sentences  Mood: sad  Affect: euthymic  Thought Process: goal-directed, logical  Thought Content: normal,  No delusions or paranoia; did not appear to be responding to internal stimuli during the session  Orientation: grossly  "intact  Memory: Grossly intact  Attention Span/Concentration: Attends to session without distraction; reports no difficulty  Fund of Knowledge: average  Estimate of Intelligence: average from verbal skills and history  Cognition: grossly intact  Insight: patient has awareness of illness; good insight into own behavior and behavior of others  Judgment: the patient's behavior is adequate to circumstances    Interval history and content of current session: Patient discussed events and activities since the time of last visit. She discussed her own health challenges and her emotional  reaction to her physical limitations and Nakul's physical limitations. She is saddened by his lack of return to prior activities (trung golf, home repairs). She sees this as a "sign it will never be the same again."    She has experienced moments of sony (dinner with friends, visit to brother-in-law, out dancing). [place]      Prior: discussion of sexual changes: Enjoyable and fulfilling sex life prior to 's illness. Historical use of Viagra, Cialis. 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 Since Padcev, his ability to have an erection (or ejaculate without an erection) is almost gone. She feels his interest is low (although they still cuddle and show other physical affection). She wonders about his thinking about their change in interaction and whether he is interested in other potential assistive devices for intercourse. (Her drive has not changed, is comfortable masturbating, unsure how he thinks about her sexual interest).     Risk parameters:   Patient reports no suicidal ideation  Patient reports no homicidal ideation  Patient reports no self-injurious behavior  Patient reports no violent behavior   Safety needs:  None at this time      Verbal deficits: None     Patient's response to intervention:The patient's " response to intervention is accepting, motivated.     Progress toward goals: Progress as Expected        Patient reported outcomes:      Distress thermometer:       11/20/2024    11:14 AM 11/6/2024     9:35 AM 10/15/2024    11:21 AM 9/17/2024     8:53 AM 9/4/2024     4:37 PM 8/20/2024     5:53 PM 8/14/2024     6:25 AM   DISTRESS SCREENING   Distress Score 2 6 3 7 4 9  9    Practical Concerns Taking care of others;Work;Finances Taking care of myself;Taking care of others;Work;Finances Taking care of myself;Taking care of others;Work;Finances;Treatment decisions Taking care of myself;Taking care of others;Work;Finances;Treatment decisions  Taking care of myself;Taking care of others;Work;Finances  Taking care of myself;Taking care of others;Work;Finances    Social Concerns Relationship with spouse or partner;Relationship with children None of these None of these None of these  None of these  None of these    Emotional Concerns Worry or anxiety;Sadness or depression Worry or anxiety;Grief or loss;Fear Worry or anxiety;Fear Worry or anxiety;Grief or loss;Feelings of worthlessness or being a burden  Worry or anxiety;Sadness or depression;Fear;Feelings of worthlessness or being a burden  Worry or anxiety;Sadness or depression;Fear    Spiritual or Restorationist Concerns None of these None of these None of these None of these  None of these  None of these    Physical Concerns Pain;Fatigue;Memory or concentration;Loss or change of physical abilities Pain;Fatigue;Loss or change of physical abilities Pain;Fatigue;Loss or change of physical abilities Pain;Fatigue;Loss or change of physical abilities  Pain;Sleep;Fatigue;Loss or change of physical abilities  Pain;Fatigue;Loss or change of physical abilities    Other Problems       's health, coping with challenges        Patient-reported           PHQ-9=PHQ ANSWERS    Q1. Little interest or pleasure in doing things: Several days (11/20/24 1117)  Q2. Feeling down, depressed, or  hopeless: Several days (11/20/24 1117)  Q3. Trouble falling or staying asleep, or sleeping too much: Not at all (11/20/24 1117)  Q4. Feeling tired or having little energy: Several days (11/20/24 1117)  Q5. Poor appetite or overeating: Several days (11/20/24 1117)  Q6. Feeling bad about yourself - or that you are a failure or have let yourself or your family down: Several days (11/20/24 1117)  Q7. Trouble concentrating on things, such as reading the newspaper or watching television: Not at all (11/20/24 1117)  Q8. Moving or speaking so slowly that other people could have noticed. Or the opposite - being so fidgety or restless that you have been moving around a lot more than usual: Not at all (11/20/24 1117)  Q9.      PHQ8 Score : 5 (11/20/24 1117)  PHQ-9 Total Score: 5 (11/20/24 1117) PHQ8 Score : 5 (11/20/24 1117)  PHQ-9 Total Score: 5 (11/20/24 1117)  ; Initial visit: 7       DEANNA-7=5 Initial visit:6       11/20/2024    11:15 AM 11/6/2024     9:36 AM 10/15/2024    11:23 AM   GAD7   1. Feeling nervous, anxious, or on edge? 1 1 1   2. Not being able to stop or control worrying? 0 1 1   3. Worrying too much about different things? 1 1 1   4. Trouble relaxing? 0 0 1   5. Being so restless that it is hard to sit still? 0 0 2   6. Becoming easily annoyed or irritable? 1 1 1   7. Feeling afraid as if something awful might happen? 0 1 1   8. If you checked off any problems, how difficult have these problems made it for you to do your work, take care of things at home, or get along with other people? 1 1 1   DEANNA-7 Score 3 5 8          Client Strengths: verbal, intelligent, successful, good social support, good insight, commitment to wellness      Treatment Plan:individual psychotherapy  Target symptoms: adjustment  Why chosen therapy is appropriate versus another modality: relevant to diagnosis, patient responds to this modality, evidence based practice  Outcome monitoring methods: self-report, checklist/rating  "scale  Therapeutic intervention type: behavior modifying psychotherapy  Prognosis: Good    Goals from last visit: Met   Behavioral goals prior to next visit:    Exercise: check senior exercise   Stress management:regular fun activities   Social engagement:    Nutrition:    Smoking Cessation:   Therapy: write about "breezy" aspect of self and how to reinforce it    Find a           Return to clinic: 2 weeks     Length of Service (minutes direct face-to-face contact): 45    Diagnosis:     ICD-10-CM ICD-9-CM   1. Adjustment disorder with anxious mood  F43.22 309.24         Xavier Núñez, PhD  [place] License #[id number]  [place] License #[id number]                                                        "

## 2024-11-21 ENCOUNTER — CLINICAL SUPPORT (OUTPATIENT)
Dept: REHABILITATION | Facility: HOSPITAL | Age: 69
End: 2024-11-21
Payer: MEDICARE

## 2024-11-21 DIAGNOSIS — M25.561 RIGHT KNEE PAIN, UNSPECIFIED CHRONICITY: Primary | ICD-10-CM

## 2024-11-21 PROCEDURE — 97530 THERAPEUTIC ACTIVITIES: CPT

## 2024-11-21 PROCEDURE — 97112 NEUROMUSCULAR REEDUCATION: CPT

## 2024-11-21 PROCEDURE — 97140 MANUAL THERAPY 1/> REGIONS: CPT

## 2024-11-21 NOTE — PROGRESS NOTES
[facility]   Physical Therapy Treatment Note      Name: Verena Toscano  Clinic Number: [id number]    Therapy Diagnosis:   Encounter Diagnosis   Name Primary?    Right knee pain, unspecified chronicity Yes     Physician: Navi Fernandez III, *    Visit Date: 11/21/2024    Physician Orders: PT Eval and Treat   Medical Diagnosis from Referral: M71.21 (ICD-10-CM) - Synovial cyst of popliteal space (Baker), right knee M17.11 (ICD-10-CM) - Unilateral primary osteoarthritis, right knee M25.561 (ICD-10-CM) - Pain in right knee  Evaluation Date: 10/28/2024  Authorization Period Expiration: 10/16/2025  Plan of Care Expiration: 01/31/2025  Progress Note Due: 10th visit  Date of Surgery: N/A  Visit # / Visits authorized: 5/20 + eval  FOTO: 1/ 3     Precautions: Standard      Time In: 15:13  Time Out: 16:09  Total Billable Time: 54 minutes    PTA Visit #: 0/5       Subjective     Patient reports: similar reports of pain from last visit. Patient reported feeling dizzy while turning at the top of the steps.   She was compliant with home exercise program.  Response to previous treatment: No adverse response to exercise.  Functional change: Ongoing    Pain: 4/10  Location: right knee      Objective      Objective Measures updated at progress report unless specified.     Treatment     Verena received the treatments listed below:      therapeutic exercises to develop strength, endurance, ROM, flexibility, posture, and core stabilization for 00 minutes including:      manual therapy techniques: Joint mobilizations were applied to the: right glenohumeral joint for 08 minutes, including:  -knee distraction with knee flexion  -superior/inferior patella mobilizations    neuromuscular re-education activities to improve: Balance, Coordination, Kinesthetic, Proprioception, Posture, and Motor Control for 20 minutes. The following activities were included:  -long arc quads x30, 5 second hold (4 lb AW on  "right, 2 lb AW on left)  -alternating cone taps 3x1 min    NP  -Straight leg raises 2x10 each leg  -short arc quads x30, 5 second hold (4 lb AW on right, 2 lb AW on left)  -SL balance with handheld assistance 5x30 each leg (Airex)  -DL glute bridges 2x10  -heel prop x3 min  -quad sets x30, 5 second hold  -lateral band walks x3 laps  -Dynamic marching over hurdles fwd x5 laps    therapeutic activities to improve functional performance for 26 minutes, including:  -NuStep x9 minutes, Level 1.5  -Stair navigation 2x7 laps (4" steps)    NP  -DL shuttle press 2x10 (2 black)  -mini squats with handheld assistance 2x7  -Sit to stand 2x10    Patient Education and Home Exercises       Education provided:   - home exercise program progression and technique  - involved anatomical structures  - prognosis and plan of care  - pain management strategies    Written Home Exercises Provided: Pt instructed to continue prior HEP. Exercises were reviewed and Verena was able to demonstrate them prior to the end of the session.  Verena demonstrated good  understanding of the education provided. See Electronic Medical Record under Patient Instructions for exercises provided during therapy sessions    Assessment     Verena presented to physical therapy today no new reports of pain. Patient performed best with distraction for functional activities. She demonstrated increased activity tolerance. Noted patellar hypomobility in bilateral knee. Minimal pain relief with knee distraction. Continue to progress as tolerated.     Verena Is progressing well towards her goals.   Patient prognosis is Good.     Patient will continue to benefit from skilled outpatient physical therapy to address the deficits listed in the problem list box on initial evaluation, provide pt/family education and to maximize pt's level of independence in the home and community environment.     Patient's spiritual, cultural and educational needs considered and pt " agreeable to plan of care and goals.     Anticipated barriers to physical therapy: Work schedule    Goals:  Short Term Goals: 4 weeks   1. Patient will reduce maximal pain rating to < 3/10 pain to facilitate ability to sleep through the night and recover from PT interventions.  2. Patient will be able to ambulate for at least 10 minutes with < 3/10 pain to improve walking tolerance.   3. Patient will improve knee range of motion to at least 135 degrees for improved functional performance.      Long Term Goals: 8-10 weeks   1. Patient will be able to complete the TUG in </= 10 seconds with the least restrictive assistive device to decrease fall risk.   2. Patient will demonstrate >/= 4/5 lower quarter strength to facilitate transfers from sit to stand from various surfaces without restriction.  3. Patient will improve 30 second sit to stand to at least 10x for improvement with transfer tasks.      Plan     Plan of care Certification: 10/28/2024 to 01/31/2025.     Outpatient Physical Therapy 2 times weekly for 10 weeks to include the following interventions: Aquatic Therapy, Gait Training, Manual Therapy, Moist Heat/ Ice, Neuromuscular Re-ed, Patient Education, Self Care, Therapeutic Activities, and Therapeutic Exercise.      Lois Hebert, PT, DPT       private services/Other (specify)

## 2024-11-26 ENCOUNTER — OFFICE VISIT (OUTPATIENT)
Dept: SPORTS MEDICINE | Facility: CLINIC | Age: 69
End: 2024-11-26
Payer: MEDICARE

## 2024-11-26 VITALS
HEART RATE: 70 BPM | DIASTOLIC BLOOD PRESSURE: 78 MMHG | WEIGHT: 193.31 LBS | SYSTOLIC BLOOD PRESSURE: 138 MMHG | BODY MASS INDEX: 31.07 KG/M2 | HEIGHT: 66 IN

## 2024-11-26 DIAGNOSIS — M23.51 RECURRENT RIGHT KNEE INSTABILITY: ICD-10-CM

## 2024-11-26 DIAGNOSIS — M71.21 POPLITEAL CYST, RIGHT: ICD-10-CM

## 2024-11-26 DIAGNOSIS — M25.561 CHRONIC PAIN OF RIGHT KNEE: Primary | ICD-10-CM

## 2024-11-26 DIAGNOSIS — M17.11 PRIMARY OSTEOARTHRITIS OF RIGHT KNEE: ICD-10-CM

## 2024-11-26 DIAGNOSIS — G89.29 CHRONIC PAIN OF RIGHT KNEE: Primary | ICD-10-CM

## 2024-11-26 PROCEDURE — 20611 DRAIN/INJ JOINT/BURSA W/US: CPT | Mod: PBBFAC | Performed by: ORTHOPAEDIC SURGERY

## 2024-11-26 PROCEDURE — 99999PBSHW PR PBB SHADOW TECHNICAL ONLY FILED TO HB: Mod: PBBFAC,,,

## 2024-11-26 PROCEDURE — 99213 OFFICE O/P EST LOW 20 MIN: CPT | Mod: PBBFAC | Performed by: ORTHOPAEDIC SURGERY

## 2024-11-26 PROCEDURE — 99999 PR PBB SHADOW E&M-EST. PATIENT-LVL III: CPT | Mod: PBBFAC,,, | Performed by: ORTHOPAEDIC SURGERY

## 2024-11-26 RX ORDER — TRIAMCINOLONE ACETONIDE 40 MG/ML
40 INJECTION, SUSPENSION INTRA-ARTICULAR; INTRAMUSCULAR
Status: COMPLETED | OUTPATIENT
Start: 2024-11-26 | End: 2024-11-26

## 2024-11-26 RX ADMIN — TRIAMCINOLONE ACETONIDE 40 MG: 40 INJECTION, SUSPENSION INTRA-ARTICULAR; INTRAMUSCULAR at 05:11

## 2024-11-26 NOTE — PROGRESS NOTES
CC: right knee pain    Verena is here today for follow up evaluation of her right knee pain. Patient reports her pain is 3/10 today. She had right knee aspiration/injection and popliteal cyst aspiration at visit 10/29/2024 and admits to appreciating approximately 2 weeks of improvement following. She states her pain has been intermittent and feels as though some of her swelling has returned. She has been attending physical therapy and appreciated improvement with her strength. She continues to appreciate instability and not feeling like she can totally trust her right knee when going up and down stairs.    Recall from visit on 10/29/2024  Verena is here today for follow up evaluation of her right knee pain. Patient reports her pain is 5/10 today. She had right knee popliteal cyst aspiration and labs ordered on the aspiration 10/22/2024. No evidence of crystals or infection. She felt as if her pain initially felt better after the aspiration. She had her initial physical therapy visit yesterday and noted an acute exacerbation of her pain when trying to get off an exam table. This pain was a sharp pain which only lasted 2 minutes before resolving and has since been hurting a bit more often. Her calf/posterior knee continues to intermittently bother her.     Recall from visit on 10/22/2024  69 y.o. Female presents today for evaluation of her right knee pain. Localized to the anterior and posterior aspect of the knee. She denies any inciting incidence or trauma. Pain does not wake her from sleep.  How long: Approximately 4 months of right knee pain and swelling  What makes it better: Pain is better with rest  What makes it worse: Walking, ascending and descending stairs  Does it radiate: Radiates into the mid calf  Attempted treatments: Hyaluronic acid injections, Tylenol, Voltaren gel  Pain score: 6-8/10 with activity, 0/10 at rest  Any mechanical symptoms: Denies  Feelings of instability: Denies  Affect on ADLs:  Yes    Occupation: clinical psychologist       PAST MEDICAL HISTORY:   Past Medical History:   Diagnosis Date    GERD (gastroesophageal reflux disease)     Hypertension     Therapy        PAST SURGICAL HISTORY:   Past Surgical History:   Procedure Laterality Date    ADENOIDECTOMY      APPENDECTOMY      breast reduction       SECTION      HYSTERECTOMY      LAMINECTOMY      L4 and 5    TONSILLECTOMY         FAMILY HISTORY:   Family History   Problem Relation Name Age of Onset    Allergies Son      Allergic rhinitis Son      Allergic rhinitis Daughter      Allergies Daughter      Eczema Daughter      Hypertension Mother      Stroke Father      Cancer Father      Hypertension Father      Angioedema Neg Hx      Asthma Neg Hx      Immunodeficiency Neg Hx         SOCIAL HISTORY:   Social History     Socioeconomic History    Marital status:      Spouse name: Nakul    Number of children: 2   Tobacco Use    Smoking status: Never    Smokeless tobacco: Never   Substance and Sexual Activity    Alcohol use: Yes     Alcohol/week: 1.0 standard drink of alcohol     Types: 1 Glasses of wine per week     Comment: beer, none today    Drug use: No   Social History Narrative    [employer] (30+ years)    2 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    psychologist     Social Drivers of Health     Financial Resource Strain: Medium Risk (10/21/2024)    Overall Financial Resource Strain (CARDIA)     Difficulty of Paying Living Expenses: Somewhat hard   Food Insecurity: No Food Insecurity (10/21/2024)    Hunger Vital Sign     Worried About Running Out of Food in the Last Year: Never true     Ran Out of Food in the Last Year: Never true    Received from [facility]    PRAPARE - Transportation   Physical Activity: Insufficiently Active (10/21/2024)    Exercise Vital Sign     Days of Exercise per Week: 2 days     Minutes of Exercise per Session: 20 min   Stress: No Stress Concern Present (10/21/2024)    [employer] - Occupational  "Stress Questionnaire     Feeling of Stress : Only a little   Housing Stability: Unknown (10/21/2024)    Housing Stability Vital Sign     Unable to Pay for Housing in the Last Year: No       MEDICATIONS:     Current Outpatient Medications:     acetaminophen (TYLENOL) 500 MG tablet, Take 1,000 mg by mouth every 6 (six) hours as needed for Pain., Disp: , Rfl:     ammonium lactate 12 % Crea, Apply 1 g topically Daily., Disp: 140 g, Rfl: 1    econazole nitrate 1 % cream, , Disp: , Rfl:     estradiol (ESTRACE) 1 MG tablet, , Disp: , Rfl:     loratadine (CLARITIN) 10 mg tablet, Take 10 mg by mouth once daily., Disp: , Rfl:     methocarbamoL (ROBAXIN) 750 MG Tab, , Disp: , Rfl:     nystatin (MYCOSTATIN) cream, APPLY TO AFFECTED AREA TWICE A DAY IF NEEDED FOR ITCHING, Disp: , Rfl:     omeprazole (PRILOSEC) 10 MG capsule, Take 10 mg by mouth once daily., Disp: , Rfl:     triamterene-hydrochlorothiazide 37.5-25 mg (DYAZIDE) 37.5-25 mg per capsule, Take 1 capsule by mouth every morning., Disp: , Rfl:     hydroCHLOROthiazide (HYDRODIURIL) 25 MG tablet, Take 1 tablet by mouth once daily., Disp: , Rfl:     naproxen (NAPROSYN) 500 MG tablet, Take 500 mg by mouth 2 (two) times daily as needed. (Patient not taking: Reported on 10/16/2024), Disp: , Rfl:   No current facility-administered medications for this visit.    ALLERGIES:   Review of patient's allergies indicates:   Allergen Reactions    Synvisc [hylan g-f 20] Other (See Comments)     Personal history of synviscitis following intra-articular Synvisc injection    Bactrim [sulfamethoxazole-trimethoprim] Other (See Comments)     Severe headaches. crystaline baldder    Demerol [meperidine] Nausea And Vomiting    Sulfa (sulfonamide antibiotics) Hives    Codeine Nausea And Vomiting    Morphine Nausea And Vomiting and Rash        PHYSICAL EXAMINATION:  /78   Pulse 70   Ht 5' 6" (1.676 m)   Wt 87.7 kg (193 lb 5.5 oz)   BMI 31.21 kg/m²   Vitals signs and nursing note have been " reviewed.  General: In no acute distress, well developed, well nourished, no diaphoresis  Eyes: EOM full and smooth, no eye redness or discharge  HENT: normocephalic and atraumatic, neck supple, trachea midline, no nasal discharge, no external ear redness or discharge  Cardiovascular: 2+ and symmetric DP pulses bilaterally, no LE edema  Lungs: respirations non-labored, no conversational dyspnea   Abd: non-distended, no rigidity  MSK: no amputation or deformity, no swelling of extremities  Neuro: AAOx3, CN2-12 grossly intact  Skin: No rashes, warm and dry  Psychiatric: cooperative, pleasant, mood and affect appropriate for age    MUSCULOSKELETAL EXAM:    RIGHT KNEE EXAMINATION   Affected side is compared to contralateral knee     Observation:  No erythema or ecchymosis noted.  Right knee effusion noted (suprapatellar, popliteal, and into medial right calf)  No muscle atrophy of the thighs and calves noted.  No obvious bony deformities noted.   No genu valgus/varum noted. No recurvatum noted.    No tibial internal/external torsion.    No pes planus/cavus.    Tenderness:   Patella - none    Lateral joint line - TTP  Quad tendon - none   Medial joint line - TTP  Patellar tendon - none  Medial plica - none  Tibial tubercle - none   Lateral plica - none  Pes anserine - none   MCL prox - none  Distal ITB - none   MCL distal -TTP  MFC - TTP    LCL prox - none  LFC - none    LCL distal - TTP  Tibia - none                   Fibula - none    Palpable popliteal cyst of the left knee          ROM:  Active extension to 0° on left without hyperextension, lag, crepitus, or patellar J sign.   Active extension to 0° on right without hyperextension, lag, crepitus, or patellar J sign.   Active flexion to 135° on left and 135° on right.    Strength: (bilaterally) (*pain)  Knee Flexion - 5/5 on left and 5/5 on right  Knee Extension - 5/5 on left and 5/5 on right  Hip Flexion - 5/5 on left and 5/5 on right  Hip Extension - 5/5 on left and  5/5 on right  Ankle dorsiflexion - 5/5 on left and 5/5 on right  Ankle Plantarflexion - 5/5 on left and 5/5 on right    Patellofemoral Exam:  Patellar ballottement - positive  Bulge sign - negative  Patellar grind - positive  No patellar laxity with medial and lateral translation   No apprehension with medial and lateral patellar translation.     Meniscus Testing:     No pain with terminal extension and flexion.  Johnnys test - negative   Bounce home test - negative    Ligament Testing:  Lachman's test - negative  No laxity with varus testing at 0 and 30 degrees.  No laxity with valgus testing at 0 and 30 degrees.    Neurovascular Examination:   Normal gait without antalgia.  Sensation intact to light touch in the obturator, lateral/intermediate/medial/posterior femoral cutaneous, saphenous, and common peroneal nerves bilaterally.  Pulses intact at the DP and PT arteries bilaterally.    Capillary refill intact <2 seconds in all toes bilaterally.      IMAGIN. X-ray obtained 10/16/2024 due to right knee pain   2. X-ray images were reviewed personally by me and then directly with patient.  3. FINDINGS: X-ray images obtained demonstrate mild moderate DJD right lateral compartment with subcortical lucent sclerotic change lateral condyle epiphysis articular surface, spur would remote cleavage plane medial tibial plateau spinous process. Less prominent DJD right patellofemoral joint with mild size suprapatellar joint effusion. Low-grade sclerotic opacity distal marrow space right femoral shaft, possible bone infarction. Tiny subcutaneous calcification left pretibial, asymmetrical subcutaneous edema left pretibial, spur with remote cleavage plane superior left patella. Foreign body posterior left tibial plateau above tibia-fibular joint.   4. IMPRESSION: As above.      Large Joint Aspiration/Injection  Popliteal cyst, right  Performed by: CHANDRAKANT AVILA.  Authorized by: CHANDRAKANT AVILA.  Consent Done?: Yes  "(Verbal)  Indications: Pain and swelling from cyst  Site marked: The procedure site was marked   Timeout: Prior to procedure the correct patient, procedure, and site was verified    Location: posterior knee cyst, right  Prep: Patient was prepped with Chlorhexidine and alcohol.  Ultrasound Guidance for needle placement: yes  Procedure: Ethyl Chloride spray was used prior to skin puncture. After cold spray was applied, 2-4 cc's of 0.2% ropivacaine was injected into the skin and superficial tissue at the injection site using a 25G, 1.5" needle to form an anesthetic tunnel and ensure proper placement of the needle into the cyst. After local anesthetic was applied an 18G, 1.5 needle was used to enter the cyst under US guidance. 14 cc of clear synovial fluid was then aspirated. Following aspiration, a 3 cc mixture of 1 cc of 40 mg/ml triamcinolone acetonide and 2 cc of 0.2% Naropin was injected into the popliteal cyst.   Approach: posterior  Medications: 40 mg triamcinolone acetonide 40 mg/mL  Patient tolerance: Patient tolerated the procedure well with no immediate complications  Dressing: Band-aid was placed over injection site following the procedure and a compression dressing was placed around knee.      Ultrasound guidance was used for needle localization with SonDefend Your Headte Edge 2, 15-6 MHz (L)probe(s). Images were saved and stored for documentation. Short and long axis images of the anterior knee were taken prior to injection confirming presence of joint effusion. The knee joint well visualized. Dynamic visualization of the needles was continuous throughout the procedure and maintained good position and correct needle placement.     Triamcinolone:  NDC: [address]-7  LOT: [id number]  EXP: 2026-07       ASSESSMENT:      ICD-10-CM ICD-9-CM   1. Chronic pain of right knee  M25.561 719.46    G89.29 338.29   2. Primary osteoarthritis of right knee  M17.11 715.16   3. Popliteal cyst, right  M71.21 727.51   4. Recurrent right " knee instability  M23.51 718.86         PLAN:  1-2. Right knee pain/OA -   3. Popliteal cyst - improved initially, then recurred  4. Recurrent right knee instability     - Verena presents today for follow-up assessment of her posterior knee swelling and consideration for US guided aspiration/injection.    - She had right knee aspiration/injection and popliteal cyst aspiration at visit 10/29/2024 and admits to appreciating approximately 2 weeks of improvement following. She has been attending physical therapy and notes improvement in her strength with this.     - We reviewed the natural history of osteoarthritis and a multipronged treatment approach. We reviewed the importance of addressing three different aspects to best manage this condition. Controlling the intra-articular immune reaction through medications and/or injections, improving local stability through bracing and/or injection, and improving functional stability through hip, core, and ankle strength, stability and mobility which may benefit from formal physical therapy.    - After discussing options she elects to proceed with ultrasound guided popliteal cyst aspiration and CSI. See above for procedure detail.     - She was fitted for a lateral  brace today by DME.       Future planning includes - pending response to popliteal cyst aspiration, possible tib/fib MRI    All questions were answered to the best of my ability and all concerns were addressed at this time.    Follow up as needed.      This note is dictated using the M*Modal Fluency Direct word recognition program. There are word recognition mistakes that are occasionally missed on review.

## 2024-12-04 ENCOUNTER — CLINICAL SUPPORT (OUTPATIENT)
Dept: REHABILITATION | Facility: HOSPITAL | Age: 69
End: 2024-12-04
Payer: MEDICARE

## 2024-12-04 DIAGNOSIS — M25.561 RIGHT KNEE PAIN, UNSPECIFIED CHRONICITY: Primary | ICD-10-CM

## 2024-12-04 PROCEDURE — 97112 NEUROMUSCULAR REEDUCATION: CPT | Mod: KX

## 2024-12-04 PROCEDURE — 97530 THERAPEUTIC ACTIVITIES: CPT | Mod: KX

## 2024-12-04 NOTE — PROGRESS NOTES
[facility]   Physical Therapy Treatment Note      Name: Verena Toscano  Clinic Number: [id number]    Therapy Diagnosis:   Encounter Diagnosis   Name Primary?    Right knee pain, unspecified chronicity Yes     Physician: Navi Fernandez III, *    Visit Date: 12/4/2024    Physician Orders: PT Eval and Treat   Medical Diagnosis from Referral: M71.21 (ICD-10-CM) - Synovial cyst of popliteal space ([place]), right knee M17.11 (ICD-10-CM) - Unilateral primary osteoarthritis, right knee M25.561 (ICD-10-CM) - Pain in right knee  Evaluation Date: 10/28/2024  Authorization Period Expiration: 10/16/2025  Plan of Care Expiration: 01/31/2025  Progress Note Due: 10th visit  Date of Surgery: N/A  Visit # / Visits authorized: 6/20 + eval  FOTO: 1/ 3     Precautions: Standard      Time In: 15:25  Time Out: 16:15  Total Billable Time: 40 minutes    PTA Visit #: 0/5       Subjective     Patient reports: visit to MD went well with knee aspiration. She reports increased right hip pain, especially when she is lying on right side. She also mentioned ordering a brace for right knee valgus.   She was compliant with home exercise program.  Response to previous treatment: No adverse response to exercise.  Functional change: Ongoing    Pain: 4/10  Location: right knee      Objective      Objective Measures updated at progress report unless specified.     Treatment     Verena received the treatments listed below:      therapeutic exercises to develop strength, endurance, ROM, flexibility, posture, and core stabilization for 00 minutes including:    manual therapy techniques: Joint mobilizations were applied to the: right glenohumeral joint for 00 minutes, including:  -knee distraction with knee flexion  -superior/inferior patella mobilizations    neuromuscular re-education activities to improve: Balance, Coordination, Kinesthetic, Proprioception, Posture, and Motor Control for 30 minutes. The following  "activities were included:  -Straight leg raises 2x10 each leg  -lateral band walks x3 laps  -knee fall outs + PPT 2x20, alternating (GreenTB)  -heel raises (standing) 3x10    NP  -long arc quads x30, 5 second hold (4 lb AW on right, 2 lb AW on left)  -alternating cone taps 3x1 min  -short arc quads x30, 5 second hold (4 lb AW on right, 2 lb AW on left)  -SL balance with handheld assistance 5x30 each leg (Airex)  -DL glute bridges 2x10  -heel prop x3 min  -quad sets x30, 5 second hold  -Dynamic marching over hurdles fwd x5 laps    therapeutic activities to improve functional performance for 10 minutes, including:  -NuStep x8 minutes, Level 1.0  -Sit to stand 1x5    NP  -Stair navigation 2x7 laps (4" steps)  -DL shuttle press 2x10 (2 black)  -mini squats with handheld assistance 2x7    Patient Education and Home Exercises       Education provided:   - home exercise program progression and technique  - involved anatomical structures  - prognosis and plan of care  - pain management strategies    Written Home Exercises Provided: Pt instructed to continue prior HEP. Exercises were reviewed and Verena was able to demonstrate them prior to the end of the session.  Verena demonstrated good  understanding of the education provided. See Electronic Medical Record under Patient Instructions for exercises provided during therapy sessions    Assessment     Verena presented to physical therapy today with improved knee pain and increased right hip pain. Patient demonstrated improved activity tolerance. Initially demonstrated quad lag on right and left legs; able to correct with verbal and tactile cues. Practice stair navigation upon next visit. Continue to progress as tolerated.     Verena Is progressing well towards her goals.   Patient prognosis is Good.     Patient will continue to benefit from skilled outpatient physical therapy to address the deficits listed in the problem list box on initial evaluation, provide " pt/family education and to maximize pt's level of independence in the home and community environment.     Patient's spiritual, cultural and educational needs considered and pt agreeable to plan of care and goals.     Anticipated barriers to physical therapy: Work schedule    Goals:  Short Term Goals: 4 weeks   1. Patient will reduce maximal pain rating to < 3/10 pain to facilitate ability to sleep through the night and recover from PT interventions.  2. Patient will be able to ambulate for at least 10 minutes with < 3/10 pain to improve walking tolerance.   3. Patient will improve knee range of motion to at least 135 degrees for improved functional performance.      Long Term Goals: 8-10 weeks   1. Patient will be able to complete the TUG in </= 10 seconds with the least restrictive assistive device to decrease fall risk.   2. Patient will demonstrate >/= 4/5 lower quarter strength to facilitate transfers from sit to stand from various surfaces without restriction.  3. Patient will improve 30 second sit to stand to at least 10x for improvement with transfer tasks.      Plan     Plan of care Certification: 10/28/2024 to 01/31/2025.     Outpatient Physical Therapy 2 times weekly for 10 weeks to include the following interventions: Aquatic Therapy, Gait Training, Manual Therapy, Moist Heat/ Ice, Neuromuscular Re-ed, Patient Education, Self Care, Therapeutic Activities, and Therapeutic Exercise.      Lois Hebert, PT, DPT

## 2024-12-09 ENCOUNTER — CLINICAL SUPPORT (OUTPATIENT)
Dept: REHABILITATION | Facility: HOSPITAL | Age: 69
End: 2024-12-09
Payer: MEDICARE

## 2024-12-09 DIAGNOSIS — M25.561 RIGHT KNEE PAIN, UNSPECIFIED CHRONICITY: Primary | ICD-10-CM

## 2024-12-09 PROCEDURE — 97530 THERAPEUTIC ACTIVITIES: CPT | Mod: KX

## 2024-12-10 ENCOUNTER — TELEPHONE (OUTPATIENT)
Dept: PSYCHIATRY | Facility: CLINIC | Age: 69
End: 2024-12-10
Payer: MEDICARE

## 2024-12-10 NOTE — PROGRESS NOTES
[facility]   Physical Therapy Treatment Note      Name: Verena Toscano  Clinic Number: [id number]    Therapy Diagnosis:   Encounter Diagnosis   Name Primary?    Right knee pain, unspecified chronicity Yes     Physician: Navi Fernandez III, *    Visit Date: 12/9/2024    Physician Orders: PT Eval and Treat   Medical Diagnosis from Referral: M71.21 (ICD-10-CM) - Synovial cyst of popliteal space (Baker), right knee M17.11 (ICD-10-CM) - Unilateral primary osteoarthritis, right knee M25.561 (ICD-10-CM) - Pain in right knee  Evaluation Date: 10/28/2024  Authorization Period Expiration: 10/16/2025  Plan of Care Expiration: 01/31/2025  Progress Note Due: 10th visit  Date of Surgery: N/A  Visit # / Visits authorized: 7/20 + eval  FOTO: 2/ 3     Precautions: Standard      Time In: 15:32  Time Out: 16:28  Total Billable Time: 30 minutes    PTA Visit #: 0/5       Subjective     Patient reports: having difficulty when carrying a 12-pack of beer from grocery store to home. She also reports decorating her house for [place] and feeling tired from that. She was able to ask her son for help.   She was compliant with home exercise program.  Response to previous treatment: No adverse response to exercise.  Functional change: Ongoing    Pain: 4/10  Location: right knee      Objective      Objective Measures updated at progress report unless specified.     Treatment     Verena received the treatments listed below:      therapeutic exercises to develop strength, endurance, ROM, flexibility, posture, and core stabilization for 00 minutes including:    manual therapy techniques: Joint mobilizations were applied to the: right glenohumeral joint for 00 minutes, including:  -knee distraction with knee flexion  -superior/inferior patella mobilizations    neuromuscular re-education activities to improve: Balance, Coordination, Kinesthetic, Proprioception, Posture, and Motor Control for 08 minutes. The  "following activities were included:  -long arc quads x30, 5 second hold (4 lb AW on right and left)    NP  -Straight leg raises 2x10 each leg  -lateral band walks x3 laps  -knee fall outs + PPT 2x20, alternating (GreenTB)  -heel raises (standing) 3x10  -alternating cone taps 3x1 min  -short arc quads x30, 5 second hold (4 lb AW on right, 2 lb AW on left)  -SL balance with handheld assistance 5x30 each leg (Airex)  -DL glute bridges 2x10  -heel prop x3 min  -quad sets x30, 5 second hold  -Dynamic marching over hurdles fwd x5 laps    therapeutic activities to improve functional performance for 46 minutes, including:  -NuStep x10 minutes, Level 1.0  -Sit to stand 3x8 (5 lbs DB)  -Stair navigation (4" step, bilateral upper extremities support) 2x10 laps   -Fwd mari step overs x10 laps (8" and 6" hurdles, UUE support)    NP  -DL shuttle press 2x10 (2 black) - discontinued  -mini squats with handheld assistance 2x7    Patient Education and Home Exercises       Education provided:   - home exercise program progression and technique  - involved anatomical structures  - prognosis and plan of care  - pain management strategies    Written Home Exercises Provided: Pt instructed to continue prior HEP. Exercises were reviewed and Verena was able to demonstrate them prior to the end of the session.  Verena demonstrated good  understanding of the education provided. See Electronic Medical Record under Patient Instructions for exercises provided during therapy sessions    Assessment     Verena presented to physical therapy today with reports of fatigue and soreness from decorating house for [place] over the weekend. She reports improved functional mobility, but she is limited with only being able to lift 15 lbs due to back surgery several years ago. She demonstrated adequate strength in ascending and descending stairs despite using bilateral upper extremities for assistance. Continue to progress as tolerated. "     Verena Is progressing well towards her goals.   Patient prognosis is Good.     Patient will continue to benefit from skilled outpatient physical therapy to address the deficits listed in the problem list box on initial evaluation, provide pt/family education and to maximize pt's level of independence in the home and community environment.     Patient's spiritual, cultural and educational needs considered and pt agreeable to plan of care and goals.     Anticipated barriers to physical therapy: Work schedule    Goals:  Short Term Goals: 4 weeks   1. Patient will reduce maximal pain rating to < 3/10 pain to facilitate ability to sleep through the night and recover from PT interventions.  2. Patient will be able to ambulate for at least 10 minutes with < 3/10 pain to improve walking tolerance.   3. Patient will improve knee range of motion to at least 135 degrees for improved functional performance.      Long Term Goals: 8-10 weeks   1. Patient will be able to complete the TUG in </= 10 seconds with the least restrictive assistive device to decrease fall risk.   2. Patient will demonstrate >/= 4/5 lower quarter strength to facilitate transfers from sit to stand from various surfaces without restriction.  3. Patient will improve 30 second sit to stand to at least 10x for improvement with transfer tasks.      Plan     Plan of care Certification: 10/28/2024 to 01/31/2025.     Outpatient Physical Therapy 2 times weekly for 10 weeks to include the following interventions: Aquatic Therapy, Gait Training, Manual Therapy, Moist Heat/ Ice, Neuromuscular Re-ed, Patient Education, Self Care, Therapeutic Activities, and Therapeutic Exercise.      Lois Hebert, PT, DPT

## 2024-12-11 ENCOUNTER — CLINICAL SUPPORT (OUTPATIENT)
Dept: REHABILITATION | Facility: HOSPITAL | Age: 69
End: 2024-12-11
Payer: MEDICARE

## 2024-12-11 ENCOUNTER — PATIENT MESSAGE (OUTPATIENT)
Dept: SPORTS MEDICINE | Facility: CLINIC | Age: 69
End: 2024-12-11
Payer: MEDICARE

## 2024-12-11 ENCOUNTER — TELEPHONE (OUTPATIENT)
Dept: PSYCHIATRY | Facility: CLINIC | Age: 69
End: 2024-12-11
Payer: MEDICARE

## 2024-12-11 DIAGNOSIS — M25.561 RIGHT KNEE PAIN, UNSPECIFIED CHRONICITY: Primary | ICD-10-CM

## 2024-12-11 PROCEDURE — 97112 NEUROMUSCULAR REEDUCATION: CPT | Mod: KX

## 2024-12-11 NOTE — PROGRESS NOTES
[facility]   Physical Therapy Treatment Note      Name: Verean Toscano  Clinic Number: [id number]    Therapy Diagnosis:   Encounter Diagnosis   Name Primary?    Right knee pain, unspecified chronicity Yes     Physician: Navi Fernandez III, *    Visit Date: 12/11/2024    Physician Orders: PT Eval and Treat   Medical Diagnosis from Referral: M71.21 (ICD-10-CM) - Synovial cyst of popliteal space (Baker), right knee M17.11 (ICD-10-CM) - Unilateral primary osteoarthritis, right knee M25.561 (ICD-10-CM) - Pain in right knee  Evaluation Date: 10/28/2024  Authorization Period Expiration: 10/16/2025  Plan of Care Expiration: 01/31/2025  Progress Note Due: 10th visit  Date of Surgery: N/A  Visit # / Visits authorized: 7/20 + eval  FOTO: 2/ 3     Precautions: Standard      Time In: 15:40  Time Out: 16:28  Total Billable Time: 30 minutes    PTA Visit #: 0/5       Subjective     Patient reports: having difficulty when carrying a 12-pack of beer from grocery store to home. She also reports decorating her house for [place] and feeling tired from that. She was able to ask her son for help.   She was compliant with home exercise program.  Response to previous treatment: No adverse response to exercise.  Functional change: Ongoing    Pain: 4/10  Location: right knee      Objective      Objective Measures updated at progress report unless specified.     Treatment     Verena received the treatments listed below:      therapeutic exercises to develop strength, endurance, ROM, flexibility, posture, and core stabilization for 00 minutes including:    manual therapy techniques: Joint mobilizations were applied to the: right glenohumeral joint for 00 minutes, including:  -knee distraction with knee flexion  -superior/inferior patella mobilizations    neuromuscular re-education activities to improve: Balance, Coordination, Kinesthetic, Proprioception, Posture, and Motor Control for 08 minutes. The  "following activities were included:  -long arc quads x30, 5 second hold (4 lb AW on right and left)    NP  -Straight leg raises 2x10 each leg  -lateral band walks x3 laps  -knee fall outs + PPT 2x20, alternating (GreenTB)  -heel raises (standing) 3x10  -alternating cone taps 3x1 min  -short arc quads x30, 5 second hold (4 lb AW on right, 2 lb AW on left)  -SL balance with handheld assistance 5x30 each leg (Airex)  -DL glute bridges 2x10  -heel prop x3 min  -quad sets x30, 5 second hold  -Dynamic marching over hurdles fwd x5 laps    therapeutic activities to improve functional performance for 46 minutes, including:  -NuStep x10 minutes, Level 1.0  -Sit to stand 3x8 (5 lbs DB)  -Stair navigation (4" step, bilateral upper extremities support) 2x10 laps   -Fwd mari step overs x10 laps (8" and 6" hurdles, UUE support)    NP  -DL shuttle press 2x10 (2 black) - discontinued  -mini squats with handheld assistance 2x7    Patient Education and Home Exercises       Education provided:   - home exercise program progression and technique  - involved anatomical structures  - prognosis and plan of care  - pain management strategies    Written Home Exercises Provided: Pt instructed to continue prior HEP. Exercises were reviewed and Verena was able to demonstrate them prior to the end of the session.  Verena demonstrated good  understanding of the education provided. See Electronic Medical Record under Patient Instructions for exercises provided during therapy sessions    Assessment     Verena presented to physical therapy today with reports of fatigue and soreness from decorating house for [place] over the weekend. She reports improved functional mobility, but she is limited with only being able to lift 15 lbs due to back surgery several years ago. She demonstrated adequate strength in ascending and descending stairs despite using bilateral upper extremities for assistance. Continue to progress as tolerated. "     Verena Is progressing well towards her goals.   Patient prognosis is Good.     Patient will continue to benefit from skilled outpatient physical therapy to address the deficits listed in the problem list box on initial evaluation, provide pt/family education and to maximize pt's level of independence in the home and community environment.     Patient's spiritual, cultural and educational needs considered and pt agreeable to plan of care and goals.     Anticipated barriers to physical therapy: Work schedule    Goals:  Short Term Goals: 4 weeks   1. Patient will reduce maximal pain rating to < 3/10 pain to facilitate ability to sleep through the night and recover from PT interventions.  2. Patient will be able to ambulate for at least 10 minutes with < 3/10 pain to improve walking tolerance.   3. Patient will improve knee range of motion to at least 135 degrees for improved functional performance.      Long Term Goals: 8-10 weeks   1. Patient will be able to complete the TUG in </= 10 seconds with the least restrictive assistive device to decrease fall risk.   2. Patient will demonstrate >/= 4/5 lower quarter strength to facilitate transfers from sit to stand from various surfaces without restriction.  3. Patient will improve 30 second sit to stand to at least 10x for improvement with transfer tasks.      Plan     Plan of care Certification: 10/28/2024 to 01/31/2025.     Outpatient Physical Therapy 2 times weekly for 10 weeks to include the following interventions: Aquatic Therapy, Gait Training, Manual Therapy, Moist Heat/ Ice, Neuromuscular Re-ed, Patient Education, Self Care, Therapeutic Activities, and Therapeutic Exercise.      Lois Hebert, PT, DPT

## 2024-12-12 NOTE — PROGRESS NOTES
[facility] OUTPATIENT THERAPY AND WELLNESS   Physical Therapy Treatment Note      Name: Verena Toscano  Clinic Number: [id number]    Therapy Diagnosis:   Encounter Diagnosis   Name Primary?    Right knee pain, unspecified chronicity Yes     Physician: Navi Fernandez III, *    Visit Date: 12/11/2024    Physician Orders: PT Eval and Treat   Medical Diagnosis from Referral: M71.21 (ICD-10-CM) - Synovial cyst of popliteal space (Baker), right knee M17.11 (ICD-10-CM) - Unilateral primary osteoarthritis, right knee M25.561 (ICD-10-CM) - Pain in right knee  Evaluation Date: 10/28/2024  Authorization Period Expiration: 10/16/2025  Plan of Care Expiration: 01/31/2025  Progress Note Due: 10th visit  Date of Surgery: N/A  Visit # / Visits authorized: 8/20 + eval  FOTO: 2/ 3     Precautions: Standard      Time In: 15:40  Time Out: 16:28  Total Billable Time: 30 minutes    PTA Visit #: 0/5       Subjective     Patient reports: increased swelling in right knee. No new reports of pain.  She was compliant with home exercise program.  Response to previous treatment: No adverse response to exercise.  Functional change: Ongoing    Pain: 4/10  Location: right knee      Objective      Objective Measures updated at progress report unless specified.     Treatment     Verena received the treatments listed below:      therapeutic exercises to develop strength, endurance, ROM, flexibility, posture, and core stabilization for 00 minutes including:    manual therapy techniques: Joint mobilizations were applied to the: right glenohumeral joint for 00 minutes, including:  -knee distraction with knee flexion  -superior/inferior patella mobilizations    neuromuscular re-education activities to improve: Balance, Coordination, Kinesthetic, Proprioception, Posture, and Motor Control for 40 minutes. The following activities were included:  -long arc quads x30, 5 second hold (4 lb AW on right and left)  -alternating cone taps 3x1 min  -lateral  "band walks x3 laps  Education on swelling management of knee post PT visits  Education on activity modification    NP  -Straight leg raises 2x10 each leg  -knee fall outs + PPT 2x20, alternating (GreenTB)  -heel raises (standing) 3x10  -short arc quads x30, 5 second hold (4 lb AW on right, 2 lb AW on left)  -SL balance with handheld assistance 5x30 each leg (Airex)  -DL glute bridges 2x10  -Dynamic marching over hurdles fwd x5 laps    therapeutic activities to improve functional performance for 08 minutes, including:  -NuStep x 8 minutes, Level 1.0    NP  -Sit to stand 3x8 (5 lbs DB)  -Stair navigation (4" step, bilateral upper extremities support) 2x10 laps   -Fwd mari step overs x10 laps (8" and 6" hurdles, UUE support)  -DL shuttle press 2x10 (2 black) - discontinued  -mini squats with handheld assistance 2x7    Patient Education and Home Exercises       Education provided:   - home exercise program progression and technique  - involved anatomical structures  - prognosis and plan of care  - pain management strategies    Written Home Exercises Provided: Pt instructed to continue prior HEP. Exercises were reviewed and Verena was able to demonstrate them prior to the end of the session.  Verena demonstrated good  understanding of the education provided. See Electronic Medical Record under Patient Instructions for exercises provided during therapy sessions    Assessment     Verena presented to physical therapy today with no new reports of pain. She tolerated today's session well with minimal reports of pain with exercises. She demonstrates improved activity tolerance. PT educated patient on swelling management of knee after PT sessions. Continue to progress as tolerated.     Verena Is progressing well towards her goals.   Patient prognosis is Good.     Patient will continue to benefit from skilled outpatient physical therapy to address the deficits listed in the problem list box on initial evaluation, " provide pt/family education and to maximize pt's level of independence in the home and community environment.     Patient's spiritual, cultural and educational needs considered and pt agreeable to plan of care and goals.     Anticipated barriers to physical therapy: Work schedule    Goals:  Short Term Goals: 4 weeks   1. Patient will reduce maximal pain rating to < 3/10 pain to facilitate ability to sleep through the night and recover from PT interventions.  2. Patient will be able to ambulate for at least 10 minutes with < 3/10 pain to improve walking tolerance.   3. Patient will improve knee range of motion to at least 135 degrees for improved functional performance.      Long Term Goals: 8-10 weeks   1. Patient will be able to complete the TUG in </= 10 seconds with the least restrictive assistive device to decrease fall risk.   2. Patient will demonstrate >/= 4/5 lower quarter strength to facilitate transfers from sit to stand from various surfaces without restriction.  3. Patient will improve 30 second sit to stand to at least 10x for improvement with transfer tasks.      Plan     Plan of care Certification: 10/28/2024 to 01/31/2025.     Outpatient Physical Therapy 2 times weekly for 10 weeks to include the following interventions: Aquatic Therapy, Gait Training, Manual Therapy, Moist Heat/ Ice, Neuromuscular Re-ed, Patient Education, Self Care, Therapeutic Activities, and Therapeutic Exercise.      Lois Hebert, PT, DPT

## 2024-12-16 ENCOUNTER — CLINICAL SUPPORT (OUTPATIENT)
Dept: REHABILITATION | Facility: HOSPITAL | Age: 69
End: 2024-12-16
Payer: MEDICARE

## 2024-12-16 DIAGNOSIS — M25.561 RIGHT KNEE PAIN, UNSPECIFIED CHRONICITY: Primary | ICD-10-CM

## 2024-12-16 PROCEDURE — 97112 NEUROMUSCULAR REEDUCATION: CPT | Mod: KX

## 2024-12-16 PROCEDURE — 97530 THERAPEUTIC ACTIVITIES: CPT | Mod: KX

## 2024-12-16 NOTE — PROGRESS NOTES
[facility]PAT[facility] THERAPY AND [facility]   Physical Therapy Treatment Note      Name: Verena Toscano  Clinic Number: [id number]    Therapy Diagnosis:   Encounter Diagnosis   Name Primary?    Right knee pain, unspecified chronicity Yes     Physician: Navi Fernandez III, *    Visit Date: 12/16/2024    Physician Orders: PT Eval and Treat   Medical Diagnosis from Referral: M71.21 (ICD-10-CM) - Synovial cyst of popliteal space (Baker), right knee M17.11 (ICD-10-CM) - Unilateral primary osteoarthritis, right knee M25.561 (ICD-10-CM) - Pain in right knee  Evaluation Date: 10/28/2024  Authorization Period Expiration: 10/16/2025  Plan of Care Expiration: 01/31/2025  Progress Note Due: 10th visit  Date of Surgery: N/A  Visit # / Visits authorized: 9/20 + eval  FOTO: 2/ 3     Precautions: Standard      Time In: 09:04  Time Out: 09:58  Total Billable Time: 30 minutes    PTA Visit #: 0/5       Subjective     Patient reports: working in yard over the weekend, she had some difficulty with the uneven surfaces and reports increased pain in right knee. She also reports pain has decreased pain today.  She was compliant with home exercise program.  Response to previous treatment: No adverse response to exercise.  Functional change: Ongoing    Pain: 4/10  Location: right knee      Objective      Objective Measures updated at progress report unless specified.     Treatment     Verena received the treatments listed below:      therapeutic exercises to develop strength, endurance, ROM, flexibility, posture, and core stabilization for 00 minutes including:    manual therapy techniques: Joint mobilizations were applied to the: right glenohumeral joint for 00 minutes, including:  -knee distraction with knee flexion  -superior/inferior patella mobilizations    neuromuscular re-education activities to improve: Balance, Coordination, Kinesthetic, Proprioception, Posture, and Motor Control for 39 minutes. The following activities were  "included:  -long arc quads x30, 5 second hold (3 lb AW on right and left)  -straight leg raises 3x10 each leg  -lateral band walks (YellowTB) x3 laps  Education on swelling management of knee post PT visits  Education on activity modification    NP  -Straight leg raises 2x10 each leg  -knee fall outs + PPT 2x20, alternating (GreenTB)  -heel raises (standing) 3x10  -short arc quads x30, 5 second hold (4 lb AW on right, 2 lb AW on left)  -SL balance with handheld assistance 5x30 each leg (Airex)  -DL glute bridges 2x10  -Dynamic marching over hurdles fwd x5 laps  -alternating cone taps 3x1 min    therapeutic activities to improve functional performance for 15 minutes, including:  -NuStep x 7 minutes, Level 1.0  -2" step downs 3x10 reps each leg    NP  -Sit to stand 3x8 (5 lbs DB)  -Stair navigation (4" step, bilateral upper extremities support) 2x10 laps   -Fwd mari step overs x10 laps (8" and 6" hurdles, UUE support)  -DL shuttle press 2x10 (2 black) - discontinued  -mini squats with handheld assistance 2x7    Patient Education and Home Exercises       Education provided:   - home exercise program progression and technique  - involved anatomical structures  - prognosis and plan of care  - pain management strategies    Written Home Exercises Provided: Pt instructed to continue prior HEP. Exercises were reviewed and Verena was able to demonstrate them prior to the end of the session.  Verena demonstrated good  understanding of the education provided. See Electronic Medical Record under Patient Instructions for exercises provided during therapy sessions    Assessment     Verena presented to physical therapy today even with increased symptoms over the weekend with yard work. She demonstrates improved activity tolerance and improved quad strength throughout session. She continues to express fear avoidance with increased activity outside of PT. Overall, continue to progress as tolerated.     Verena Is " progressing well towards her goals.   Patient prognosis is Good.     Patient will continue to benefit from skilled outpatient physical therapy to address the deficits listed in the problem list box on initial evaluation, provide pt/family education and to maximize pt's level of independence in the home and community environment.     Patient's spiritual, cultural and educational needs considered and pt agreeable to plan of care and goals.     Anticipated barriers to physical therapy: Work schedule    Goals:  Short Term Goals: 4 weeks   1. Patient will reduce maximal pain rating to < 3/10 pain to facilitate ability to sleep through the night and recover from PT interventions.  2. Patient will be able to ambulate for at least 10 minutes with < 3/10 pain to improve walking tolerance.   3. Patient will improve knee range of motion to at least 135 degrees for improved functional performance.      Long Term Goals: 8-10 weeks   1. Patient will be able to complete the TUG in </= 10 seconds with the least restrictive assistive device to decrease fall risk.   2. Patient will demonstrate >/= 4/5 lower quarter strength to facilitate transfers from sit to stand from various surfaces without restriction.  3. Patient will improve 30 second sit to stand to at least 10x for improvement with transfer tasks.      Plan     Plan of care Certification: 10/28/2024 to 01/31/2025.     Outpatient Physical Therapy 2 times weekly for 10 weeks to include the following interventions: Aquatic Therapy, Gait Training, Manual Therapy, Moist Heat/ Ice, Neuromuscular Re-ed, Patient Education, Self Care, Therapeutic Activities, and Therapeutic Exercise.      Lois Hebert, PT, DPT

## 2024-12-18 ENCOUNTER — CLINICAL SUPPORT (OUTPATIENT)
Dept: REHABILITATION | Facility: HOSPITAL | Age: 69
End: 2024-12-18
Payer: MEDICARE

## 2024-12-18 DIAGNOSIS — M25.561 RIGHT KNEE PAIN, UNSPECIFIED CHRONICITY: Primary | ICD-10-CM

## 2024-12-18 PROCEDURE — 97530 THERAPEUTIC ACTIVITIES: CPT | Mod: KX

## 2024-12-18 PROCEDURE — 97112 NEUROMUSCULAR REEDUCATION: CPT | Mod: KX

## 2024-12-18 NOTE — PROGRESS NOTES
[facility]   Physical Therapy Treatment Note      Name: Verena Toscano  Clinic Number: [id number]    Therapy Diagnosis:   Encounter Diagnosis   Name Primary?    Right knee pain, unspecified chronicity Yes     Physician: Navi Fernandez III, *    Visit Date: 12/18/2024    Physician Orders: PT Eval and Treat   Medical Diagnosis from Referral: M71.21 (ICD-10-CM) - Synovial cyst of popliteal space (Baker), right knee M17.11 (ICD-10-CM) - Unilateral primary osteoarthritis, right knee M25.561 (ICD-10-CM) - Pain in right knee  Evaluation Date: 10/28/2024  Authorization Period Expiration: 10/16/2025  Plan of Care Expiration: 01/31/2025  Progress Note Due: 10th visit  Date of Surgery: N/A  Visit # / Visits authorized: 10/20 + eval  FOTO: 2/ 3     Precautions: Standard      Time In: 14:45  Time Out: 15:25  Total Billable Time: 25 minutes    PTA Visit #: 0/5       Subjective     Patient reports: no new reports of pain. She has several errands to run before the holidays.    She was compliant with home exercise program.  Response to previous treatment: No adverse response to exercise.  Functional change: Ongoing    Pain: 4/10  Location: right knee      Objective      Objective Measures updated at progress report unless specified.     Treatment     Verena received the treatments listed below:      therapeutic exercises to develop strength, endurance, ROM, flexibility, posture, and core stabilization for 00 minutes including:    manual therapy techniques: Joint mobilizations were applied to the: right glenohumeral joint for 00 minutes, including:  -knee distraction with knee flexion  -superior/inferior patella mobilizations    neuromuscular re-education activities to improve: Balance, Coordination, Kinesthetic, Proprioception, Posture, and Motor Control for 15 minutes. The following activities were included:  -lateral band walks (YellowTB) x3 laps  -heel raises (standing) 3x10    NP  -long arc  "quads x30, 5 second hold (3 lb AW on right and left)  -straight leg raises 3x10 each leg  Education on swelling management of knee post PT visits  Education on activity modification  -Straight leg raises 2x10 each leg  -knee fall outs + PPT 2x20, alternating (GreenTB)  -short arc quads x30, 5 second hold (4 lb AW on right, 2 lb AW on left)  -SL balance with handheld assistance 5x30 each leg (Airex)  -DL glute bridges 2x10  -Dynamic marching over hurdles fwd x5 laps  -alternating cone taps 3x1 min    therapeutic activities to improve functional performance for 25 minutes, including:  -NuStep x 8 minutes, Level 1.0  -Stair navigation (4" step, bilateral upper extremities support) 2x10 laps   -2" step downs 3x10 reps each leg    NP  -Sit to stand 3x8 (5 lbs DB)  -Fwd mari step overs x10 laps (8" and 6" hurdles, UUE support)  -DL shuttle press 2x10 (2 black) - discontinued  -mini squats with handheld assistance 2x7    Patient Education and Home Exercises       Education provided:   - home exercise program progression and technique  - involved anatomical structures  - prognosis and plan of care  - pain management strategies    Written Home Exercises Provided: Pt instructed to continue prior HEP. Exercises were reviewed and Verena was able to demonstrate them prior to the end of the session.  Verena demonstrated good  understanding of the education provided. See Electronic Medical Record under Patient Instructions for exercises provided during therapy sessions    Assessment     Verena presented to physical therapy today with no new reports of pain. Patient had pupils dilated after visit to MD and arrived with sunglasses initially for light sensitivity. She tolerated session well with increased functional activities. She reports minimal knee pain when performing exercise without any rest breaks. Once patient was encouraged to rest between sets, pain improved. Overall, continue to progress as tolerated. Consider " reassessment upon next visit.     Verena Is progressing well towards her goals.   Patient prognosis is Good.     Patient will continue to benefit from skilled outpatient physical therapy to address the deficits listed in the problem list box on initial evaluation, provide pt/family education and to maximize pt's level of independence in the home and community environment.     Patient's spiritual, cultural and educational needs considered and pt agreeable to plan of care and goals.     Anticipated barriers to physical therapy: Work schedule    Goals:  Short Term Goals: 4 weeks   1. Patient will reduce maximal pain rating to < 3/10 pain to facilitate ability to sleep through the night and recover from PT interventions.  2. Patient will be able to ambulate for at least 10 minutes with < 3/10 pain to improve walking tolerance.   3. Patient will improve knee range of motion to at least 135 degrees for improved functional performance.      Long Term Goals: 8-10 weeks   1. Patient will be able to complete the TUG in </= 10 seconds with the least restrictive assistive device to decrease fall risk.   2. Patient will demonstrate >/= 4/5 lower quarter strength to facilitate transfers from sit to stand from various surfaces without restriction.  3. Patient will improve 30 second sit to stand to at least 10x for improvement with transfer tasks.      Plan     Plan of care Certification: 10/28/2024 to 01/31/2025.     Outpatient Physical Therapy 2 times weekly for 10 weeks to include the following interventions: Aquatic Therapy, Gait Training, Manual Therapy, Moist Heat/ Ice, Neuromuscular Re-ed, Patient Education, Self Care, Therapeutic Activities, and Therapeutic Exercise.      Lois Hebert, PT, DPT

## 2024-12-26 ENCOUNTER — TELEPHONE (OUTPATIENT)
Dept: SPORTS MEDICINE | Facility: CLINIC | Age: 69
End: 2024-12-26
Payer: MEDICARE

## 2024-12-26 NOTE — TELEPHONE ENCOUNTER
Spoke to the Pt about her concerns about her brace. after she informed me of what was discussed to her about her brace not being covered by insurance and how the people who called her didn't really introduce themselves or give a reason to why insurance did not cover or what else can be done about coverage.  I informed her that I would send a message to the correct person for this info about he coverage of the brace.

## 2024-12-26 NOTE — TELEPHONE ENCOUNTER
----- Message from Mars sent at 12/26/2024  3:53 PM CST -----  Regarding: pty advice  Contact: [phone]  Pt is calling top speak with someone in your office about why pt leg brace wasn't approved. Please contact pt with information .    Verena Toscano  [phone]

## 2025-01-02 ENCOUNTER — TELEPHONE (OUTPATIENT)
Dept: SPORTS MEDICINE | Facility: CLINIC | Age: 70
End: 2025-01-02
Payer: MEDICARE

## 2025-01-02 NOTE — TELEPHONE ENCOUNTER
----- Message from Jennifer sent at 12/31/2024 12:13 PM CST -----  Pt Requesting Call Back    Who called: pt  Who called for pt:  Best call back #:  [phone]  Add notes: pt said she wants to know when (date) an orthopedic brace was ordered for her

## 2025-01-02 NOTE — TELEPHONE ENCOUNTER
Spoke to the pt about her concerns for the brace that was ordered for her.  I had informed her the last time she called about this, the DME is looking into this matter and will reach out to her once they have some info.  Pt expressed some concern for the phone call about the denial and did not understand why her insurance denied it or it will not be covered as she has a secondary insurance.

## 2025-01-02 NOTE — TELEPHONE ENCOUNTER
----- Message from Fern sent at 1/2/2025  9:47 AM CST -----  Type:  Pt Advice     Who Called: Pt   Who Left Message for Patient: Kathia  Does the patient know what this is regarding?: pt has more info about discussion from earlier - personal  Would the patient rather a call back or a response via [facility]? Call   Best Call Back Number:[phone]   Additional Information:

## 2025-01-02 NOTE — TELEPHONE ENCOUNTER
Spoke to the Pt about her questions for the brace again as well as other questions she has. I informed her that we can get her an appt with the Dr so that we can discuss it all.  She confirmed the details to the appt.

## 2025-01-03 ENCOUNTER — OFFICE VISIT (OUTPATIENT)
Dept: PSYCHIATRY | Facility: CLINIC | Age: 70
End: 2025-01-03
Payer: MEDICARE

## 2025-01-03 DIAGNOSIS — F43.22 ADJUSTMENT DISORDER WITH ANXIOUS MOOD: Primary | ICD-10-CM

## 2025-01-03 PROCEDURE — 99212 OFFICE O/P EST SF 10 MIN: CPT | Mod: PBBFAC | Performed by: PSYCHOLOGIST

## 2025-01-03 PROCEDURE — 99999 PR PBB SHADOW E&M-EST. PATIENT-LVL II: CPT | Mod: PBBFAC,,, | Performed by: PSYCHOLOGIST

## 2025-01-03 NOTE — PROGRESS NOTES
PSYCHO-ONCOLOGY NOTE/ Individual Psychotherapy       Date: 1/3/2025   [facility] therapist:  [facility]          Therapeutic Intervention: Met with patient.  Outpatient - Behavior modifying psychotherapy 45 min - CPT code 05392  The patient's last visit with me was on 11/20/2024.    Problem list  Patient Active Problem List   Diagnosis    Cough    Cognitive complaints    Adjustment disorder with anxious mood    Cervical vertigo    Dyslipidemia    Essential hypertension    Hormone replacement therapy    Intertrigo    Neck pain    Vitamin D deficiency    Vulvitis    Caregiver stress    Right knee pain       Chief complaint/reason for encounter: anxiety   Met with patient to evaluate psychosocial adaptation to caregiving    Current Medications  Current Outpatient Medications   Medication    acetaminophen (TYLENOL) 500 MG tablet    ammonium lactate 12 % Crea    econazole nitrate 1 % cream    estradiol (ESTRACE) 1 MG tablet    hydroCHLOROthiazide (HYDRODIURIL) 25 MG tablet    loratadine (CLARITIN) 10 mg tablet    methocarbamoL (ROBAXIN) 750 MG Tab    naproxen (NAPROSYN) 500 MG tablet    nystatin (MYCOSTATIN) cream    omeprazole (PRILOSEC) 10 MG capsule    triamterene-hydrochlorothiazide 37.5-25 mg (DYAZIDE) 37.5-25 mg per capsule     No current facility-administered medications for this visit.       Objective:  Verena Toscano arrived promptly for the session.  Ms. Toscano was independently ambulatory at the time of session. The patient was fully cooperative throughout the session.  Appearance: age appropriate, casually  dressed, well groomed  Behavior/Cooperation: friendly and cooperative  Speech: normal in rate, volume, and tone and appropriate quality, quantity and organization of sentences  Mood: dysthymic  Affect: tearful  Thought Process: goal-directed, logical  Thought Content: normal,  No delusions or paranoia; did not appear to be responding to internal stimuli during the session  Orientation: grossly  intact  Memory: Grossly intact  Attention Span/Concentration: Attends to session without distraction; reports no difficulty  Fund of Knowledge: average  Estimate of Intelligence: average from verbal skills and history  Cognition: grossly intact  Insight: patient has awareness of illness; good insight into own behavior and behavior of others  Judgment: the patient's behavior is adequate to circumstances    Interval history and content of current session: Patient discussed events and activities since the time of last visit. She discussed her own health challenges and her emotional  reaction to her physical limitations.  She discussed changes to Holiday traditions and her acceptance of these changes.     She has experienced challenges getting her orthopedic needs met. Discussed appropriate assertiveness.    Prior: discussion of sexual changes: Enjoyable and fulfilling sex life prior to 's illness. Historical use of Viagra, Cialis. 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 Since Padcev, his ability to have an erection (or ejaculate without an erection) is almost gone. She feels his interest is low (although they still cuddle and show other physical affection). She wonders about his thinking about their change in interaction and whether he is interested in other potential assistive devices for intercourse. (Her drive has not changed, is comfortable masturbating, unsure how he thinks about her sexual interest).     Risk parameters:   Patient reports no suicidal ideation  Patient reports no homicidal ideation  Patient reports no self-injurious behavior  Patient reports no violent behavior   Safety needs:  None at this time      Verbal deficits: None     Patient's response to intervention:The patient's response to intervention is accepting, motivated.     Progress toward goals: Progress as Expected        Patient reported outcomes:       Distress thermometer:       1/2/2025    10:59 AM 11/20/2024    11:14 AM 11/6/2024     9:35 AM 10/15/2024    11:21 AM 9/17/2024     8:53 AM 9/4/2024     4:37 PM 8/20/2024     5:53 PM   DISTRESS SCREENING   Distress Score 7  2 6 3 7 4 9    Practical Concerns Taking care of myself;Taking care of others;Work;Finances;Access to medicine;Treatment decisions;None of these  Taking care of others;Work;Finances Taking care of myself;Taking care of others;Work;Finances Taking care of myself;Taking care of others;Work;Finances;Treatment decisions Taking care of myself;Taking care of others;Work;Finances;Treatment decisions  Taking care of myself;Taking care of others;Work;Finances    Social Concerns Communication with health care team  Relationship with spouse or partner;Relationship with children None of these None of these None of these  None of these    Emotional Concerns Worry or anxiety;Fear;Changes in appearance  Worry or anxiety;Sadness or depression Worry or anxiety;Grief or loss;Fear Worry or anxiety;Fear Worry or anxiety;Grief or loss;Feelings of worthlessness or being a burden  Worry or anxiety;Sadness or depression;Fear;Feelings of worthlessness or being a burden    Spiritual or Cheondoism Concerns None of these  None of these None of these None of these None of these  None of these    Physical Concerns Pain;Fatigue;Loss or change of physical abilities  Pain;Fatigue;Memory or concentration;Loss or change of physical abilities Pain;Fatigue;Loss or change of physical abilities Pain;Fatigue;Loss or change of physical abilities Pain;Fatigue;Loss or change of physical abilities  Pain;Sleep;Fatigue;Loss or change of physical abilities        Patient-reported           PHQ-9=PHQ ANSWERS    Q1. Little interest or pleasure in doing things: (Patient-Rptd) (P) Several days (01/02/25 1101)  Q2. Feeling down, depressed, or hopeless: (Patient-Rptd) (P) Several days (01/02/25 1101)  Q3. Trouble falling or staying asleep, or  sleeping too much: (Patient-Rptd) (P) Not at all (01/02/25 1101)  Q4. Feeling tired or having little energy: (Patient-Rptd) (P) Several days (01/02/25 1101)  Q5. Poor appetite or overeating: (Patient-Rptd) (P) Not at all (01/02/25 1101)  Q6. Feeling bad about yourself - or that you are a failure or have let yourself or your family down: (Patient-Rptd) (P) Several days (01/02/25 1101)  Q7. Trouble concentrating on things, such as reading the newspaper or watching television: (Patient-Rptd) (P) Not at all (01/02/25 1101)  Q8. Moving or speaking so slowly that other people could have noticed. Or the opposite - being so fidgety or restless that you have been moving around a lot more than usual: (Patient-Rptd) (P) Not at all (01/02/25 1101)  Q9.      PHQ-9 Total Score: (Patient-Rptd) (P) 4 (01/02/25 1101)  ; Initial visit: 7       DEANNA-7=5 Initial visit:6       1/2/2025    11:00 AM 11/20/2024    11:15 AM 11/6/2024     9:36 AM   GAD7   1. Feeling nervous, anxious, or on edge? 1  1 1   2. Not being able to stop or control worrying? 0  0 1   3. Worrying too much about different things? 1  1 1   4. Trouble relaxing? 0  0 0   5. Being so restless that it is hard to sit still? 0  0 0   6. Becoming easily annoyed or irritable? 1  1 1   7. Feeling afraid as if something awful might happen? 1  0 1   8. If you checked off any problems, how difficult have these problems made it for you to do your work, take care of things at home, or get along with other people? 1  1 1   DEANNA-7 Score 4  3 5       Patient-reported          Client Strengths: verbal, intelligent, successful, good social support, good insight, commitment to wellness      Treatment Plan:individual psychotherapy  Target symptoms: adjustment  Why chosen therapy is appropriate versus another modality: relevant to diagnosis, patient responds to this modality, evidence based practice  Outcome monitoring methods: self-report, checklist/rating scale  Therapeutic intervention  "type: behavior modifying psychotherapy  Prognosis: Good    Goals from last visit: Met   Behavioral goals prior to next visit:    Exercise: check senior exercise   Stress management:regular fun activities    "Live in Confidence"   Social engagement:    Nutrition:    Smoking Cessation:   Therapy: appropriate assertiveness    Self-talk around others' judgments of her          Return to clinic: 2 weeks     Length of Service (minutes direct face-to-face contact): 45    Diagnosis:     ICD-10-CM ICD-9-CM   1. Adjustment disorder with anxious mood  F43.22 309.24         Xavier Núñez, PhD  [place] License #[id number]  [place] License #[id number]          "

## 2025-01-09 ENCOUNTER — CLINICAL SUPPORT (OUTPATIENT)
Dept: REHABILITATION | Facility: HOSPITAL | Age: 70
End: 2025-01-09
Payer: MEDICARE

## 2025-01-09 DIAGNOSIS — M25.561 RIGHT KNEE PAIN, UNSPECIFIED CHRONICITY: Primary | ICD-10-CM

## 2025-01-09 PROCEDURE — 97530 THERAPEUTIC ACTIVITIES: CPT

## 2025-01-09 PROCEDURE — 97112 NEUROMUSCULAR REEDUCATION: CPT

## 2025-01-10 NOTE — PROGRESS NOTES
[facility]   Physical Therapy Treatment Note      Name: Verena Toscano  Clinic Number: [id number]    Therapy Diagnosis:   Encounter Diagnosis   Name Primary?    Right knee pain, unspecified chronicity Yes     Physician: Navi Fernandez III, *    Visit Date: 1/9/2025    Physician Orders: PT Eval and Treat   Medical Diagnosis from Referral: M71.21 (ICD-10-CM) - Synovial cyst of popliteal space (Baker), right knee M17.11 (ICD-10-CM) - Unilateral primary osteoarthritis, right knee M25.561 (ICD-10-CM) - Pain in right knee  Evaluation Date: 10/28/2024  Authorization Period Expiration: 10/16/2025  Plan of Care Expiration: 01/31/2025  Progress Note Due: 10th visit  Date of Surgery: N/A  Visit # / Visits authorized: 1/20  Episode Visit: 12  FOTO: 2/ 3     Precautions: Standard      Time In: 15:02  Time Out: 15:54  Total Billable Time: 52 minutes    PTA Visit #: 0/5       Subjective     Patient reports: no new reports of pain. She is unsure if she will be able to get the knee valgus brace.     She was compliant with home exercise program.  Response to previous treatment: No adverse response to exercise.  Functional change: Ongoing    Pain: 4/10  Location: right knee      Objective      Objective Measures updated at progress report unless specified.     Treatment     Verena received the treatments listed below:      therapeutic exercises to develop strength, endurance, ROM, flexibility, posture, and core stabilization for 00 minutes including:    manual therapy techniques: Joint mobilizations were applied to the: right glenohumeral joint for 00 minutes, including: NP  -knee distraction with knee flexion  -superior/inferior patella mobilizations    neuromuscular re-education activities to improve: Balance, Coordination, Kinesthetic, Proprioception, Posture, and Motor Control for 30 minutes. The following activities were included:  -lateral band walks (YellowTB) x5 laps  -long arc quads x30, 5  "second hold (3 lb AW on right and left)  -straight leg raises 3x10 each leg    NP  -heel raises (standing) 3x10  -Straight leg raises 2x10 each leg  -knee fall outs + PPT 2x20, alternating (GreenTB)  -short arc quads x30, 5 second hold (4 lb AW on right, 2 lb AW on left)  -SL balance with handheld assistance 5x30 each leg (Airex)  -DL glute bridges 2x10  -alternating cone taps 3x1 min    therapeutic activities to improve functional performance for 22 minutes, including:  -NuStep x 10 minutes, Level 1.0  -Dynamic marching over hurdles fwd x5 laps    NP  -Stair navigation (4" step, bilateral upper extremities support) 2x10 laps   -2" step downs 3x10 reps each leg  -Sit to stand 3x8 (5 lbs DB)  -Fwd mari step overs x10 laps (8" and 6" hurdles, UUE support)  -DL shuttle press 2x10 (2 black) - discontinued  -mini squats with handheld assistance 2x7    Patient Education and Home Exercises       Education provided:   - home exercise program progression and technique  - involved anatomical structures  - prognosis and plan of care  - pain management strategies    Written Home Exercises Provided: Pt instructed to continue prior HEP. Exercises were reviewed and Verena was able to demonstrate them prior to the end of the session.  Verena demonstrated good  understanding of the education provided. See Electronic Medical Record under Patient Instructions for exercises provided during therapy sessions    Assessment     Verena presented to physical therapy today with no new reports of pain. She reports some pain in right knee after hosting and household activities over the holidays. She is not sure if she will be able to obtain knee valgus brace from [facility]. She has follow up with MD this month to discuss knee pain. She tolerated today's session well with focus on quadriceps strengthening. Overall, continue to progress as tolerated. Consider reassessment upon next visit.     Verena Is progressing well towards her goals. "   Patient prognosis is Good.     Patient will continue to benefit from skilled outpatient physical therapy to address the deficits listed in the problem list box on initial evaluation, provide pt/family education and to maximize pt's level of independence in the home and community environment.     Patient's spiritual, cultural and educational needs considered and pt agreeable to plan of care and goals.     Anticipated barriers to physical therapy: Work schedule    Goals:  Short Term Goals: 4 weeks   1. Patient will reduce maximal pain rating to < 3/10 pain to facilitate ability to sleep through the night and recover from PT interventions.  2. Patient will be able to ambulate for at least 10 minutes with < 3/10 pain to improve walking tolerance.   3. Patient will improve knee range of motion to at least 135 degrees for improved functional performance.      Long Term Goals: 8-10 weeks   1. Patient will be able to complete the TUG in </= 10 seconds with the least restrictive assistive device to decrease fall risk.   2. Patient will demonstrate >/= 4/5 lower quarter strength to facilitate transfers from sit to stand from various surfaces without restriction.  3. Patient will improve 30 second sit to stand to at least 10x for improvement with transfer tasks.      Plan     Plan of care Certification: 10/28/2024 to 01/31/2025.     Outpatient Physical Therapy 2 times weekly for 10 weeks to include the following interventions: Aquatic Therapy, Gait Training, Manual Therapy, Moist Heat/ Ice, Neuromuscular Re-ed, Patient Education, Self Care, Therapeutic Activities, and Therapeutic Exercise.      Lois Hebert, PT, DPT

## 2025-01-14 ENCOUNTER — OFFICE VISIT (OUTPATIENT)
Dept: SPORTS MEDICINE | Facility: CLINIC | Age: 70
End: 2025-01-14
Payer: MEDICARE

## 2025-01-14 VITALS — HEART RATE: 65 BPM | DIASTOLIC BLOOD PRESSURE: 81 MMHG | SYSTOLIC BLOOD PRESSURE: 152 MMHG

## 2025-01-14 DIAGNOSIS — M23.51 RECURRENT RIGHT KNEE INSTABILITY: ICD-10-CM

## 2025-01-14 DIAGNOSIS — M71.21 POPLITEAL CYST, RIGHT: ICD-10-CM

## 2025-01-14 DIAGNOSIS — M25.561 CHRONIC PAIN OF RIGHT KNEE: Primary | ICD-10-CM

## 2025-01-14 DIAGNOSIS — G89.29 CHRONIC PAIN OF RIGHT KNEE: Primary | ICD-10-CM

## 2025-01-14 DIAGNOSIS — M17.11 PRIMARY OSTEOARTHRITIS OF RIGHT KNEE: ICD-10-CM

## 2025-01-14 PROCEDURE — 99214 OFFICE O/P EST MOD 30 MIN: CPT | Mod: S$PBB,,, | Performed by: ORTHOPAEDIC SURGERY

## 2025-01-14 PROCEDURE — 99999 PR PBB SHADOW E&M-EST. PATIENT-LVL III: CPT | Mod: PBBFAC,,, | Performed by: ORTHOPAEDIC SURGERY

## 2025-01-14 PROCEDURE — 99213 OFFICE O/P EST LOW 20 MIN: CPT | Mod: PBBFAC | Performed by: ORTHOPAEDIC SURGERY

## 2025-01-14 NOTE — PROGRESS NOTES
CC: right knee pain    Verena is here today for follow up evaluation of her right knee pain. Patient reports her pain is 5/10 today. She had right knee aspiration/injection and popliteal cyst aspiration at visit 11/26/24 and admits to appreciating intermittent relief for a few weeks following. She has been compliant with PT and states she feels this is helping her functionally with her ADLs.  She continues to endorse medial, lateral and popliteal pain,  periodic swelling and has begun to notice compensatory pain in her left knee. She reports being unable to get the  brace discussed at her previous appointment due to insurance complications.    Recall from visit on 11/26/2024  Verena is here today for follow up evaluation of her right knee pain. Patient reports her pain is 3/10 today. She had right knee aspiration/injection and popliteal cyst aspiration at visit 10/29/2024 and admits to appreciating approximately 2 weeks of improvement following. She states her pain has been intermittent and feels as though some of her swelling has returned. She has been attending physical therapy and appreciated improvement with her strength. She continues to appreciate instability and not feeling like she can totally trust her right knee when going up and down stairs.    Recall from visit on 10/29/2024  Verena is here today for follow up evaluation of her right knee pain. Patient reports her pain is 5/10 today. She had right knee popliteal cyst aspiration and labs ordered on the aspiration 10/22/2024. No evidence of crystals or infection. She felt as if her pain initially felt better after the aspiration. She had her initial physical therapy visit yesterday and noted an acute exacerbation of her pain when trying to get off an exam table. This pain was a sharp pain which only lasted 2 minutes before resolving and has since been hurting a bit more often. Her calf/posterior knee continues to intermittently bother her.      Recall from visit on 10/22/2024  69 y.o. Female presents today for evaluation of her right knee pain. Localized to the anterior and posterior aspect of the knee. She denies any inciting incidence or trauma. Pain does not wake her from sleep.  How long: Approximately 4 months of right knee pain and swelling  What makes it better: Pain is better with rest  What makes it worse: Walking, ascending and descending stairs  Does it radiate: Radiates into the mid calf  Attempted treatments: Hyaluronic acid injections, Tylenol, Voltaren gel  Pain score: 6-8/10 with activity, 0/10 at rest  Any mechanical symptoms: Denies  Feelings of instability: Denies  Affect on ADLs: Yes    Occupation: clinical psychologist       PAST MEDICAL HISTORY:   Past Medical History:   Diagnosis Date    GERD (gastroesophageal reflux disease)     Hypertension     Therapy        PAST SURGICAL HISTORY:   Past Surgical History:   Procedure Laterality Date    ADENOIDECTOMY      APPENDECTOMY      breast reduction       SECTION      HYSTERECTOMY      LAMINECTOMY      L4 and 5    TONSILLECTOMY         FAMILY HISTORY:   Family History   Problem Relation Name Age of Onset    Allergies Son      Allergic rhinitis Son      Allergic rhinitis Daughter      Allergies Daughter      Eczema Daughter      Hypertension Mother      Stroke Father      Cancer Father      Hypertension Father      Angioedema Neg Hx      Asthma Neg Hx      Immunodeficiency Neg Hx         SOCIAL HISTORY:   Social History     Socioeconomic History    Marital status:      Spouse name: Nakul    Number of children: 2   Tobacco Use    Smoking status: Never    Smokeless tobacco: Never   Substance and Sexual Activity    Alcohol use: Yes     Alcohol/week: 1.0 standard drink of alcohol     Types: 1 Glasses of wine per week     Comment: beer, none today    Drug use: No   Social History Narrative    [employer] (30+ years)    2 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    psychologist     Social Drivers of Health      Financial Resource Strain: Medium Risk (10/21/2024)    Overall Financial Resource Strain (CARDIA)     Difficulty of Paying Living Expenses: Somewhat hard   Food Insecurity: No Food Insecurity (10/21/2024)    Hunger Vital Sign     Worried About Running Out of Food in the Last Year: Never true     Ran Out of Food in the Last Year: Never true    Received from [facility]    PRAPARE - Transportation   Physical Activity: Insufficiently Active (10/21/2024)    Exercise Vital Sign     Days of Exercise per Week: 2 days     Minutes of Exercise per Session: 20 min   Stress: No Stress Concern Present (10/21/2024)    [employer] - Occupational Stress Questionnaire     Feeling of Stress : Only a little   Housing Stability: Unknown (10/21/2024)    Housing Stability Vital Sign     Unable to Pay for Housing in the Last Year: No       MEDICATIONS:     Current Outpatient Medications:     acetaminophen (TYLENOL) 500 MG tablet, Take 1,000 mg by mouth every 6 (six) hours as needed for Pain., Disp: , Rfl:     ammonium lactate 12 % Crea, Apply 1 g topically Daily., Disp: 140 g, Rfl: 1    econazole nitrate 1 % cream, , Disp: , Rfl:     estradiol (ESTRACE) 1 MG tablet, , Disp: , Rfl:     loratadine (CLARITIN) 10 mg tablet, Take 10 mg by mouth once daily., Disp: , Rfl:     methocarbamoL (ROBAXIN) 750 MG Tab, , Disp: , Rfl:     nystatin (MYCOSTATIN) cream, APPLY TO AFFECTED AREA TWICE A DAY IF NEEDED FOR ITCHING, Disp: , Rfl:     omeprazole (PRILOSEC) 10 MG capsule, Take 10 mg by mouth once daily., Disp: , Rfl:     triamterene-hydrochlorothiazide 37.5-25 mg (DYAZIDE) 37.5-25 mg per capsule, Take 1 capsule by mouth every morning., Disp: , Rfl:     hydroCHLOROthiazide (HYDRODIURIL) 25 MG tablet, Take 1 tablet by mouth once daily., Disp: , Rfl:     naproxen (NAPROSYN) 500 MG tablet, Take 500 mg by mouth 2 (two) times daily as needed. (Patient not taking: Reported on 10/16/2024), Disp: , Rfl:     ALLERGIES:    Review of patient's allergies indicates:   Allergen Reactions    Synvisc [hylan g-f 20] Other (See Comments)     Personal history of synviscitis following intra-articular Synvisc injection    Bactrim [sulfamethoxazole-trimethoprim] Other (See Comments)     Severe headaches. crystaline baldder    Demerol [meperidine] Nausea And Vomiting    Sulfa (sulfonamide antibiotics) Hives    Codeine Nausea And Vomiting    Morphine Nausea And Vomiting and Rash        PHYSICAL EXAMINATION:  BP (!) 152/81   Pulse 65   Vitals signs and nursing note have been reviewed.  General: In no acute distress, well developed, well nourished, no diaphoresis  Eyes: EOM full and smooth, no eye redness or discharge  HENT: normocephalic and atraumatic, neck supple, trachea midline, no nasal discharge, no external ear redness or discharge  Cardiovascular: 2+ and symmetric DP pulses bilaterally, no LE edema  Lungs: respirations non-labored, no conversational dyspnea   Abd: non-distended, no rigidity  MSK: no amputation or deformity, no swelling of extremities  Neuro: AAOx3, CN2-12 grossly intact  Skin: No rashes, warm and dry  Psychiatric: cooperative, pleasant, mood and affect appropriate for age    MUSCULOSKELETAL EXAM:  RIGHT KNEE EXAMINATION   Affected side is compared to contralateral knee     Observation:  No erythema or ecchymosis noted.  Right knee effusion noted (suprapatellar, popliteal, and into medial right calf)  No muscle atrophy of the thighs and calves noted.  No obvious bony deformities noted.   No genu valgus/varum noted. No recurvatum noted.    No tibial internal/external torsion.    No pes planus/cavus.    Tenderness:   Patella - none    Lateral joint line - TTP  Quad tendon - none   Medial joint line - TTP  Patellar tendon - none  Medial plica - none  Tibial tubercle - none   Lateral plica - none  Pes anserine - none   MCL prox - none  Distal ITB - none   MCL distal -TTP  MFC - TTP    LCL prox - none  LFC - none    LCL distal  - TTP  Tibia - none                   Fibula - none    Palpable popliteal cyst of the left knee          ROM:  Active extension to 0° on left without hyperextension, lag, crepitus, or patellar J sign.   Active extension to 0° on right without hyperextension, lag, crepitus, or patellar J sign.   Active flexion to 135° on left and 135° on right.    Strength: (bilaterally) (*pain)  Knee Flexion - 5/5 on left and 5/5 on right  Knee Extension - 5/5 on left and 5/5 on right  Hip Flexion - 5/5 on left and 5/5 on right  Hip Extension - 5/5 on left and 5/5 on right  Ankle dorsiflexion - 5/5 on left and 5/5 on right  Ankle Plantarflexion - 5/5 on left and 5/5 on right    Patellofemoral Exam:  Patellar ballottement - positive  Bulge sign - negative  Patellar grind - positive  No patellar laxity with medial and lateral translation   No apprehension with medial and lateral patellar translation.     Meniscus Testing:     No pain with terminal extension and flexion.  Johnnys test - negative   Bounce home test - negative    Ligament Testing:  Lachman's test - negative  No laxity with varus testing at 0 and 30 degrees.  No laxity with valgus testing at 0 and 30 degrees.    Neurovascular Examination:   Normal gait without antalgia.  Sensation intact to light touch in the obturator, lateral/intermediate/medial/posterior femoral cutaneous, saphenous, and common peroneal nerves bilaterally.  Pulses intact at the DP and PT arteries bilaterally.    Capillary refill intact <2 seconds in all toes bilaterally.      IMAGIN. X-ray obtained 10/16/2024 due to right knee pain   2. X-ray images were reviewed personally by me and then directly with patient.  3. FINDINGS: X-ray images obtained demonstrate mild moderate DJD right lateral compartment with subcortical lucent sclerotic change lateral condyle epiphysis articular surface, spur would remote cleavage plane medial tibial plateau spinous process. Less prominent DJD right  patellofemoral joint with mild size suprapatellar joint effusion. Low-grade sclerotic opacity distal marrow space right femoral shaft, possible bone infarction. Tiny subcutaneous calcification left pretibial, asymmetrical subcutaneous edema left pretibial, spur with remote cleavage plane superior left patella. Foreign body posterior left tibial plateau above tibia-fibular joint.   4. IMPRESSION: As above.       ASSESSMENT:      ICD-10-CM ICD-9-CM   1. Chronic pain of right knee  M25.561 719.46    G89.29 338.29   2. Primary osteoarthritis of right knee  M17.11 715.16   3. Popliteal cyst, right  M71.21 727.51   4. Recurrent right knee instability  M23.51 718.86       PLAN:  1-2. Right knee pain/OA - stable  3. Popliteal cyst - improved initially, then recurred  4. Recurrent right knee instability     - Verena presents today for follow-up assessment of her posterior knee swelling and to discuss bracing and possible next steps.    - She had right knee aspiration/injection and popliteal cyst aspiration at visit 11/26/2024 and admits to appreciating intermittent relief for a few weeks following. She has been attending physical therapy and notes improvement in her strength and ADLs with this.     - We reviewed the natural history of osteoarthritis and a multipronged treatment approach. We reviewed the importance of addressing three different aspects to best manage this condition. Controlling the intra-articular immune reaction through medications and/or injections, improving local stability through bracing and/or injection, and improving functional stability through hip, core, and ankle strength, stability and mobility which may benefit from formal physical therapy.    - EDUCATION FOR PRP: Education provided on the benefits, adverse effects, likely course of treatment and improvement, and post-injection expectations from PRP. Handout given to patient. Reviewed that it is a non-covered cash service. It may takes several  treatments to have long standing resolution. The PRP injection may include tenotomy, and this was explained to the patient. We reviewed that initially after treatment pain may become more intense and this is an anticipated response. We instructed that improvement may be experienced within the first two weeks, and that most improvement will come between weeks 6 and 12. If there is no improvement, we would not likely repeat. If less than 90% improvement is experienced at 12 weeks, we would consider and discuss repeating.     - After discussing options she elects to proceed with scheduling an intra-articular ACP injection.     - DME met with her again today to discuss the lateral . Insurance approval is still pending.    - New PT referral placed so that she can continue. She's finding benefit from going regularly.      Future planning includes - pending response to ACP injection    All questions were answered to the best of my ability and all concerns were addressed at this time.    Follow up on 1/27/2024 for ACP injection.      This note is dictated using the M*Modal Fluency Direct word recognition program. There are word recognition mistakes that are occasionally missed on review.      Total time spent face-to face with patient counseling or coordinating care including prognosis, differential diagnosis, risks and benefits of treatment, instructions, compliance risk reductions as well as non-face-to-face time personally spent reviewing medial record, medical documentation, and coordination of care.     EST MINUTES X   [id number] 10-19    [id number] 20-29    [id number] 30-39 32   [id number] 40-54    NEW     [id number] 15-29    31049 30-44    [id number] 45-59    21853 60-74    PHONE      5-10    [id number] 11-20    03427 21-30

## 2025-01-16 ENCOUNTER — CLINICAL SUPPORT (OUTPATIENT)
Dept: REHABILITATION | Facility: HOSPITAL | Age: 70
End: 2025-01-16
Payer: MEDICARE

## 2025-01-16 DIAGNOSIS — M25.561 RIGHT KNEE PAIN, UNSPECIFIED CHRONICITY: Primary | ICD-10-CM

## 2025-01-16 PROCEDURE — 97112 NEUROMUSCULAR REEDUCATION: CPT

## 2025-01-16 PROCEDURE — 97530 THERAPEUTIC ACTIVITIES: CPT

## 2025-01-16 NOTE — PROGRESS NOTES
[facility]   Physical Therapy Treatment Note      Name: Verena Toscano  Clinic Number: [id number]    Therapy Diagnosis:   Encounter Diagnosis   Name Primary?    Right knee pain, unspecified chronicity Yes     Physician: Navi Fernandez III, *    Visit Date: 1/16/2025    Physician Orders: PT Eval and Treat   Medical Diagnosis from Referral: M71.21 (ICD-10-CM) - Synovial cyst of popliteal space ([place]), right knee M17.11 (ICD-10-CM) - Unilateral primary osteoarthritis, right knee M25.561 (ICD-10-CM) - Pain in right knee  Evaluation Date: 10/28/2024  Authorization Period Expiration: 10/16/2025  Plan of Care Expiration: 01/31/2025  Progress Note Due: 10th visit  Date of Surgery: N/A  Visit # / Visits authorized: 2/20  Episode Visit: 13  FOTO: 2/ 3     Precautions: Standard      Time In: 8:05  Time Out: 8:59  Total Billable Time: 54 minutes    PTA Visit #: 0/5       Subjective     Patient reports: no new reports of pain. She reports after discussion with MD that she will most probably get the knee brace. Also scheduled for PRP injections.     She was compliant with home exercise program.  Response to previous treatment: No adverse response to exercise.  Functional change: Ongoing    Pain: 4/10  Location: right knee      Objective      Objective Measures updated at progress report unless specified.     Treatment     Verena received the treatments listed below:      therapeutic exercises to develop strength, endurance, ROM, flexibility, posture, and core stabilization for 00 minutes including:    manual therapy techniques: Joint mobilizations were applied to the: right glenohumeral joint for 00 minutes, including: NP  -knee distraction with knee flexion  -superior/inferior patella mobilizations    neuromuscular re-education activities to improve: Balance, Coordination, Kinesthetic, Proprioception, Posture, and Motor Control for 40 minutes. The following activities were included:  -long  "arc quads x30, 5 second hold (5 lb AW on right and left)  -SAQ on small bolster 3 x 10 (right lower extremity only)  -straight leg raises 3x10 each leg  -lateral band walks (YellowTB) x5 laps    NP  -heel raises (standing) 3x10  -knee fall outs + PPT 2x20, alternating (GreenTB)  -short arc quads x30, 5 second hold (4 lb AW on right, 2 lb AW on left)  -SL balance with handheld assistance 5x30 each leg (Airex)  -DL glute bridges 2x10  -alternating cone taps 3x1 min    therapeutic activities to improve functional performance for 14 minutes, including:  -Stair navigation (4" step, bilateral upper extremities support) 2x10 laps    NP   -Dynamic marching over hurdles fwd x5 laps  -NuStep x 10 minutes, Level 1.0  -2" step downs 3x10 reps each leg  -Sit to stand 3x8 (5 lbs DB)  -DL shuttle press 2x10 (2 black) - discontinued  -mini squats with handheld assistance 2x7    Patient Education and Home Exercises       Education provided:   - home exercise program progression and technique  - involved anatomical structures  - prognosis and plan of care  - pain management strategies    Written Home Exercises Provided: Pt instructed to continue prior HEP. Exercises were reviewed and Verena was able to demonstrate them prior to the end of the session.  Verena demonstrated good  understanding of the education provided. See Electronic Medical Record under Patient Instructions for exercises provided during therapy sessions    Assessment     Verena presented to physical therapy today with no new reports of pain. She demonstrates increased fear with navigating stairs, but she is able to perform stair navigation with bilateral upper extremity assistance. She tolerated increased resistance for quad exercises well. Overall, continue to progress as tolerated. Consider reassessment upon next visit.     Verena Is progressing well towards her goals.   Patient prognosis is Good.     Patient will continue to benefit from skilled " outpatient physical therapy to address the deficits listed in the problem list box on initial evaluation, provide pt/family education and to maximize pt's level of independence in the home and community environment.     Patient's spiritual, cultural and educational needs considered and pt agreeable to plan of care and goals.     Anticipated barriers to physical therapy: Work schedule    Goals:  Short Term Goals: 4 weeks   1. Patient will reduce maximal pain rating to < 3/10 pain to facilitate ability to sleep through the night and recover from PT interventions.  2. Patient will be able to ambulate for at least 10 minutes with < 3/10 pain to improve walking tolerance.   3. Patient will improve knee range of motion to at least 135 degrees for improved functional performance.      Long Term Goals: 8-10 weeks   1. Patient will be able to complete the TUG in </= 10 seconds with the least restrictive assistive device to decrease fall risk.   2. Patient will demonstrate >/= 4/5 lower quarter strength to facilitate transfers from sit to stand from various surfaces without restriction.  3. Patient will improve 30 second sit to stand to at least 10x for improvement with transfer tasks.      Plan     Plan of care Certification: 10/28/2024 to 01/31/2025.     Outpatient Physical Therapy 2 times weekly for 10 weeks to include the following interventions: Aquatic Therapy, Gait Training, Manual Therapy, Moist Heat/ Ice, Neuromuscular Re-ed, Patient Education, Self Care, Therapeutic Activities, and Therapeutic Exercise.      Lois Hebert, PT, DPT

## 2025-01-17 ENCOUNTER — PATIENT MESSAGE (OUTPATIENT)
Dept: SPORTS MEDICINE | Facility: CLINIC | Age: 70
End: 2025-01-17
Payer: MEDICARE

## 2025-01-22 ENCOUNTER — OFFICE VISIT (OUTPATIENT)
Dept: PSYCHIATRY | Facility: CLINIC | Age: 70
End: 2025-01-22
Payer: MEDICARE

## 2025-01-22 DIAGNOSIS — Z63.6 CAREGIVER STRESS: ICD-10-CM

## 2025-01-22 DIAGNOSIS — F43.22 ADJUSTMENT DISORDER WITH ANXIOUS MOOD: Primary | ICD-10-CM

## 2025-01-22 PROCEDURE — 90834 PSYTX W PT 45 MINUTES: CPT | Mod: 95,,, | Performed by: PSYCHOLOGIST

## 2025-01-22 SDOH — SOCIAL DETERMINANTS OF HEALTH (SDOH): DEPENDENT RELATIVE NEEDING CARE AT HOME: Z63.6
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
## 2025-01-27 ENCOUNTER — OFFICE VISIT (OUTPATIENT)
Dept: SPORTS MEDICINE | Facility: CLINIC | Age: 70
End: 2025-01-27
Payer: MEDICARE

## 2025-01-27 ENCOUNTER — CLINICAL SUPPORT (OUTPATIENT)
Dept: REHABILITATION | Facility: HOSPITAL | Age: 70
End: 2025-01-27
Payer: MEDICARE

## 2025-01-27 VITALS
HEIGHT: 66 IN | SYSTOLIC BLOOD PRESSURE: 130 MMHG | DIASTOLIC BLOOD PRESSURE: 83 MMHG | BODY MASS INDEX: 31.07 KG/M2 | WEIGHT: 193.31 LBS

## 2025-01-27 DIAGNOSIS — M25.561 CHRONIC PAIN OF RIGHT KNEE: Primary | ICD-10-CM

## 2025-01-27 DIAGNOSIS — G89.29 CHRONIC PAIN OF RIGHT KNEE: ICD-10-CM

## 2025-01-27 DIAGNOSIS — M17.11 PRIMARY OSTEOARTHRITIS OF RIGHT KNEE: ICD-10-CM

## 2025-01-27 DIAGNOSIS — M25.561 CHRONIC PAIN OF RIGHT KNEE: ICD-10-CM

## 2025-01-27 DIAGNOSIS — G89.29 CHRONIC PAIN OF RIGHT KNEE: Primary | ICD-10-CM

## 2025-01-27 PROCEDURE — 99499 UNLISTED E&M SERVICE: CPT | Mod: CSM,,, | Performed by: ORTHOPAEDIC SURGERY

## 2025-01-27 PROCEDURE — 99213 OFFICE O/P EST LOW 20 MIN: CPT | Mod: PBBFAC | Performed by: ORTHOPAEDIC SURGERY

## 2025-01-27 PROCEDURE — 97530 THERAPEUTIC ACTIVITIES: CPT

## 2025-01-27 PROCEDURE — 0232T NJX PLATELET PLASMA: CPT | Mod: CSM,,, | Performed by: ORTHOPAEDIC SURGERY

## 2025-01-27 PROCEDURE — 99999 PR PBB SHADOW E&M-EST. PATIENT-LVL III: CPT | Mod: PBBFAC,,, | Performed by: ORTHOPAEDIC SURGERY

## 2025-01-27 NOTE — PROGRESS NOTES
Outpatient Rehab    Physical Therapy Progress Note : Updated Plan of Care    Patient Name: Verena Toscano  MRN: [id number]  YOB: 1955  Today's Date: 2025    Therapy Diagnosis:   Encounter Diagnoses   Name Primary?    Chronic pain of right knee     Primary osteoarthritis of right knee      Physician: Navi Fernandez III, *    Physician Orders: Eval and Treat  Medical Diagnosis:  Synovial cyst of popliteal space (Baker), right knee [M71.21], Unilateral primary osteoarthritis, right knee [M17.11], Pain in right knee [M25.561]     Visit # / Visits Authorized:  3 / 20   Date of Evaluation: 10/28/2024  Insurance Authorization Period: 2025 to 2025  Plan of Care Certification:  2025 to 3/31/2025      Time In: 0905   Time Out: 1000  Total Time: 55 minutes  Total Billable Time: 28 minutes         Subjective    Patient visits MD today for PRP injection.     Pain     Patient reports a current pain level of 4/10.             Past Medical History/Physical Systems Review:   Verena Toscano  has a past medical history of GERD (gastroesophageal reflux disease), Hypertension, and Therapy.    Verena Toscano  has a past surgical history that includes Tonsillectomy; Laminectomy;  section; Hysterectomy; breast reduction; Appendectomy; and Adenoidectomy.    Verena has a current medication list which includes the following prescription(s): acetaminophen, ammonium lactate, econazole nitrate, estradiol, loratadine, methocarbamol, nystatin, omeprazole, and triamterene-hydrochlorothiazide 37.5-25 mg.    Review of patient's allergies indicates:   Allergen Reactions    Synvisc [hylan g-f 20] Other (See Comments)     Personal history of synviscitis following intra-articular Synvisc injection    Bactrim [sulfamethoxazole-trimethoprim] Other (See Comments)     Severe headaches. crystaline baldder    Demerol [meperidine] Nausea And Vomiting    Sulfa (sulfonamide antibiotics) Hives     Codeine Nausea And Vomiting    Morphine Nausea And Vomiting and Rash        Objective            Treatment:  Therapeutic Exercise  Therapeutic Exercise Activity 1: NuStep for endogenous release of opioids: 5 minutes  Therapeutic Exercise Activity 2: Education on activity modification of activities to decrease knee pain    Balance/Neuromuscular Re-Education  Balance/Neuromuscular Re-Education Activity 1: Quad series- Straight leg raises, long arc quas (3#): 3 x 10 reps  Balance/Neuromuscular Re-Education Activity 2: Lateral band walks (YellowTB): 5 laps with HHA  Balance/Neuromuscular Re-Education Activity 3: Standing hip extensions (3#): 3 x 10 reps    Therapeutic Activity  Therapeutic Activity 1: Lateral step overs (medium mari): 3 x 10 rounds    Patient's spiritual, cultural, and educational needs considered and patient agreeable to plan of care and goals.     Assessment & Plan    Verena presents to physical therapy with no new reports of pain. She feels discouraged due to feeling limited in functional mobility. She demonstrated improved tolerance to exercise. PT educated eddie on activity modification and managing expectations of outcomes from osteoarthritis. Noted increased pain in left knee with clsoed chain exercises. Will progress as tolerated.     Goals:   Active       Long Term Goals       Patient will be able to complete the TUG in </= 10 seconds with the least restrictive assistive device to decrease fall risk.  (Progressing)       Start:  01/27/25    Expected End:  03/31/25            Patient will demonstrate >/= 4/5 lower quarter strength to facilitate transfers from sit to stand from various surfaces without restriction. (Progressing)       Start:  01/27/25    Expected End:  03/31/25            Patient will improve 30 second sit to stand to at least 10x for improvement with transfer tasks. (Progressing)       Start:  01/27/25    Expected End:  03/31/25               Short Term Goals        Patient will reduce maximal pain rating to < 3/10 pain to facilitate ability to sleep through the night and recover from PT interventions. (Met)       Start:  01/27/25    Expected End:  02/28/25    Resolved:  01/27/25         Patient will be able to ambulate for at least 10 minutes with < 3/10 pain to improve walking tolerance.  (Progressing)       Start:  01/27/25    Expected End:  02/28/25            Patient will improve knee range of motion to at least 135 degrees for improved functional performance.  (Progressing)       Start:  01/27/25    Expected End:  02/28/25

## 2025-01-27 NOTE — PROGRESS NOTES
PRE-PROCEDURE DIAGNOSIS:   1. Chronic pain of right knee  Sports Medicine US - Guidance for Needle Placement      2. Primary osteoarthritis of right knee  Sports Medicine US - Guidance for Needle Placement          PROCEDURE TYPE: ACP under ultrasound guidance     RISKS, BENEFITS, ADVERSE EFFECTS: ACP : We reviewed that this is a medical procedure involving the harvesting of some of the patient's own blood. This tissue will be minimally manipulated and prepared for injection, and after, the tissue will be injected into damaged ligaments, tendons, muscle and/or joints for the purpose of strengthening, healing, and/or lessening pain at the damaged area(s). The diagnosis, the nature of the treatment, and the theoretical basis of regenerative medicine has been explained. The patient was given the opportunity to ask any questions concerning this specific procedure. We further reviewed that in many medical circles and by insurance companies, this is still considered experimental and/or investigational. Further, it is not standard of care in many medical communities and, as such, it is not covered by insurance. We further reviewed that this procedure is part of a treatment program, and all parts of the program are essential to a successful outcome, including but not limited to post-injection physical therapy/exercises at home and/or in office. As part of a treatment program, this procedure may involve several injections given at each treatment session AND may require multiple treatment sessions. We also reviewed alternative or conventional methods of treatment and the probability that the proposed treatment will or will not be successful. No guarantee or assurance has been made relative to results that may be expected from these techniques. Furthermore, the patient understands that they may withdraw from the treatment or treatment plan at any time.     We reviewed that, while this is inherently a safe procedure, there are  "risks involved. The specific risks depend on the tissue being aspirated and the reinjection site of treatment.      RISKS INCLUDE, but not be limited to:   COMMON: Soreness and/or moderately increased pain with bruising for several days after each treatment and decreased range of motion from what may be normal for the patient on most days. Improvement in pain and function may take up to 12 weeks, occasionally longer in some cases.  RARE: I understand these to include, but not be limited to infection, partial or complete tendon rupture, worsening pain, headache, bleeding (bruising or hematoma) allergic reaction (itching, rash, shortness of breath, seizures, and even death).     POTENTIAL BENEFITS include: reduction or elimination of pain and greater function.      PROCEDURE DETAILS:       Timeout: Prior to procedure the correct patient, procedure, and site was verified.     Pure ACP Arthrex Slidely  Preparation: Phlebotomy, platelet harvest, and ACP  preparation  Sterile technique was used to phlebotomize 15 cc of the patients blood from a peripheral vein. This blood was  into density-divided layers using an [employer]  centrifuge. The layer of leukocyte poor ACP , a volume of 6 cc, was placed into a sterile syringe in preparation for injection.    Joint Injection: Using ultrasound guidance, the tip of the 18 G x 1.5" needle was seen to directly enter the joint space and 27 cc or clear synovial fluid was aspirated. Using a hemostat, the syringe was exchanged with the ACP syringe and 6 cc of ACP was injected into the right knee. Care was taken to stay within the involved joint for all ACP  injected.      DISCHARGE CONDITION: The patient tolerated the procedure well and there were no immediate complications. The patient was instructed to call the clinic immediately for any mild to moderate adverse side effects, or to call 911 in the event of an emergency.     Complications: none     Estimated blood loss " from injection procedure: none     Joint aspirate volume: 27 cc     POST PROCEDURE INSTRUCTIONS: Post-procedure care handout was provided and explained to the patient before leaving clinic.  Following the procedure, a sterile bandage was placed over the injection site.    Description of ultrasound utilization for needle guidance:    Ultrasound guidance was used for needle localization with SonoSite Edge 2, 15-6 MHz (L) probe(s). Images were saved and stored for documentation. The right knee joint was well visualized. Dynamic visualization of the needle was continuous throughout the procedure and maintained good position and correct needle placement.

## 2025-01-29 ENCOUNTER — CLINICAL SUPPORT (OUTPATIENT)
Dept: REHABILITATION | Facility: HOSPITAL | Age: 70
End: 2025-01-29
Payer: MEDICARE

## 2025-01-29 DIAGNOSIS — M25.561 RIGHT KNEE PAIN, UNSPECIFIED CHRONICITY: Primary | ICD-10-CM

## 2025-01-29 PROCEDURE — 97530 THERAPEUTIC ACTIVITIES: CPT

## 2025-01-29 PROCEDURE — 97112 NEUROMUSCULAR REEDUCATION: CPT

## 2025-01-30 ENCOUNTER — PATIENT MESSAGE (OUTPATIENT)
Dept: SPORTS MEDICINE | Facility: CLINIC | Age: 70
End: 2025-01-30
Payer: MEDICARE

## 2025-01-30 NOTE — PROGRESS NOTES
Outpatient Rehab    Physical Therapy Visit    Patient Name: Verena Toscano  MRN: [id number]  YOB: 1955  Today's Date: 1/30/2025    Therapy Diagnosis:   Encounter Diagnosis   Name Primary?    Right knee pain, unspecified chronicity Yes     Physician: Navi Fernandez III, *    Physician Orders: Eval and Treat  Medical Diagnosis:  Synovial cyst of popliteal space (Baker), right knee [M71.21], Unilateral primary osteoarthritis, right knee [M17.11], Pain in right knee [M25.561]      Visit # / Visits Authorized:  3 / 20   Date of Evaluation: 10/28/2024  Insurance Authorization Period: 1/1/2025 to 12/31/2025  Plan of Care Certification:  1/27/2025 to 3/31/2025       Time In: 0840   Time Out: 0935  Total Time: 55 minutes  Total Billable Time: 45 minutes         Subjective   Patient reports no new reports of right knee pain..  Pain reported as 4.      Objective            Treatment:  Balance/Neuromuscular Re-Education  Balance/Neuromuscular Re-Education Activity 1: Quad series- Straight leg raises, long arc quas (3#): 3 x 10 reps  Balance/Neuromuscular Re-Education Activity 3: Standing hip extensions (3#): 3 x 10 reps  Balance/Neuromuscular Re-Education Activity 4: NuStep for gait mechanics: 10 minutes. Level 1    Therapeutic Activity  Therapeutic Activity 2: Ambulating in PT clinic with cues for knee extension and dorsiflexion on heel strike; education on proper gait mechanics.    Patient's spiritual, cultural, and educational needs considered and patient agreeable to plan of care and goals.     Assessment & Plan   Assessment: Verena presents to physical therapy with no new reports of pain. Today's session focused on proper gait mechanics with increased quad strength and range of motion. She demonstrates improved gait with moderate cueing for knee extension with heel strike. She tolerated session well today. Will progress as tolerated.  Evaluation/Treatment Tolerance: Patient tolerated treatment  well        Plan: Continue POC toward patient's goals.    Goals:   Active       Long Term Goals       Patient will be able to complete the TUG in </= 10 seconds with the least restrictive assistive device to decrease fall risk.  (Progressing)       Start:  01/27/25    Expected End:  03/31/25            Patient will demonstrate >/= 4/5 lower quarter strength to facilitate transfers from sit to stand from various surfaces without restriction. (Progressing)       Start:  01/27/25    Expected End:  03/31/25            Patient will improve 30 second sit to stand to at least 10x for improvement with transfer tasks. (Progressing)       Start:  01/27/25    Expected End:  03/31/25               Short Term Goals       Patient will reduce maximal pain rating to < 3/10 pain to facilitate ability to sleep through the night and recover from PT interventions. (Met)       Start:  01/27/25    Expected End:  02/28/25    Resolved:  01/27/25         Patient will be able to ambulate for at least 10 minutes with < 3/10 pain to improve walking tolerance.  (Progressing)       Start:  01/27/25    Expected End:  02/28/25            Patient will improve knee range of motion to at least 135 degrees for improved functional performance.  (Progressing)       Start:  01/27/25    Expected End:  02/28/25

## 2025-01-31 ENCOUNTER — PATIENT MESSAGE (OUTPATIENT)
Dept: SPORTS MEDICINE | Facility: CLINIC | Age: 70
End: 2025-01-31
Payer: MEDICARE

## 2025-02-03 ENCOUNTER — CLINICAL SUPPORT (OUTPATIENT)
Dept: REHABILITATION | Facility: HOSPITAL | Age: 70
End: 2025-02-03
Payer: MEDICARE

## 2025-02-03 DIAGNOSIS — M25.561 RIGHT KNEE PAIN, UNSPECIFIED CHRONICITY: Primary | ICD-10-CM

## 2025-02-03 PROCEDURE — 97530 THERAPEUTIC ACTIVITIES: CPT

## 2025-02-03 PROCEDURE — 97112 NEUROMUSCULAR REEDUCATION: CPT

## 2025-02-03 NOTE — PROGRESS NOTES
Outpatient Rehab    Physical Therapy Progress Note/Treatment    Patient Name: Verena Toscano  MRN: [id number]  YOB: 1955  Today's Date: 2/3/2025    Therapy Diagnosis:   Encounter Diagnosis   Name Primary?    Right knee pain, unspecified chronicity Yes     Physician: Navi Fernandez III, *    Physician Orders: Eval and Treat  Medical Diagnosis:  Synovial cyst of popliteal space (Baker), right knee [M71.21], Unilateral primary osteoarthritis, right knee [M17.11], Pain in right knee [M25.561]      Visit # / Visits Authorized:  3 / 20   Date of Evaluation: 10/28/2024  Insurance Authorization Period: 1/1/2025 to 12/31/2025  Plan of Care Certification:  1/27/2025 to 3/31/2025      Time In: 0803   Time Out: 0903  Total Time: 60 minutes  Total Billable Time: 30 minutes         Subjective      Pain     Patient reports a current pain level of 4/10.                 Objective         Sit to Stand Testing      The patient completed 8 repetitions of a sit to stand transfer in 30 seconds. no UE support             Intake Outcome Measure for FOTO Survey    Therapist reviewed FOTO scores for Verena Toscano on 2/3/2025.   FOTO report - see Media section or FOTO account episode details.     Intake Score:  %  Survey Score 1:  %  Survey Score 2:  %    Treatment:  Balance/Neuromuscular Re-Education  Balance/Neuromuscular Re-Education Activity 1: Quad series- Straight leg raises, long arc quas (3#): 3 x 10 reps  Balance/Neuromuscular Re-Education Activity 2: Lateral band walks (YellowTB): 5 laps with HHA  Balance/Neuromuscular Re-Education Activity 4: NuStep for gait mechanics: 10 minutes. Level 1  Balance/Neuromuscular Re-Education Activity 5: Reassess: 30S STS    Therapeutic Activity  Therapeutic Activity 1: Lateral step overs (large mari): 3 x 10 rounds  Therapeutic Activity 3: Sit to stands (5#): 3 x 10 reps    Patient's spiritual, cultural, and educational needs considered and patient agreeable to plan of  care and goals.     Assessment & Plan   Assessment: Verena presents to physical therapy with increased reports of anterior leg soreness from improving dorsiflexion in ambulation. PT discussed options for gym membership to promote physical activity and improve strengthening for BLE. Patient is agreeable to decreasing to 1x/week in upcoming weeks. Demonstrates decreased UE support in sit to stands upon resassessment. She tolerated session well today. Will progress as tolerated.  Evaluation/Treatment Tolerance: Patient tolerated treatment well        Plan: [place] POC toward patient's goals.    Goals:   Active       Long Term Goals       Patient will be able to complete the TUG in </= 10 seconds with the least restrictive assistive device to decrease fall risk.  (Progressing)       Start:  01/27/25    Expected End:  03/31/25            Patient will demonstrate >/= 4/5 lower quarter strength to facilitate transfers from sit to stand from various surfaces without restriction. (Progressing)       Start:  01/27/25    Expected End:  03/31/25            Patient will improve 30 second sit to stand to at least 10x for improvement with transfer tasks. (Progressing)       Start:  01/27/25    Expected End:  03/31/25               Short Term Goals       Patient will reduce maximal pain rating to < 3/10 pain to facilitate ability to sleep through the night and recover from PT interventions. (Met)       Start:  01/27/25    Expected End:  02/28/25    Resolved:  01/27/25         Patient will be able to ambulate for at least 10 minutes with < 3/10 pain to improve walking tolerance.  (Progressing)       Start:  01/27/25    Expected End:  02/28/25            Patient will improve knee range of motion to at least 135 degrees for improved functional performance.  (Progressing)       Start:  01/27/25    Expected End:  02/28/25

## 2025-02-05 ENCOUNTER — OFFICE VISIT (OUTPATIENT)
Dept: PSYCHIATRY | Facility: CLINIC | Age: 70
End: 2025-02-05
Payer: MEDICARE

## 2025-02-05 ENCOUNTER — CLINICAL SUPPORT (OUTPATIENT)
Dept: REHABILITATION | Facility: HOSPITAL | Age: 70
End: 2025-02-05
Payer: MEDICARE

## 2025-02-05 DIAGNOSIS — F43.22 ADJUSTMENT DISORDER WITH ANXIOUS MOOD: Primary | ICD-10-CM

## 2025-02-05 DIAGNOSIS — M25.561 RIGHT KNEE PAIN, UNSPECIFIED CHRONICITY: Primary | ICD-10-CM

## 2025-02-05 PROCEDURE — 97112 NEUROMUSCULAR REEDUCATION: CPT

## 2025-02-05 PROCEDURE — 99999 PR PBB SHADOW E&M-EST. PATIENT-LVL I: CPT | Mod: PBBFAC,,, | Performed by: PSYCHOLOGIST

## 2025-02-05 PROCEDURE — 97530 THERAPEUTIC ACTIVITIES: CPT

## 2025-02-05 PROCEDURE — 90837 PSYTX W PT 60 MINUTES: CPT | Mod: ,,, | Performed by: PSYCHOLOGIST

## 2025-02-05 PROCEDURE — 99211 OFF/OP EST MAY X REQ PHY/QHP: CPT | Mod: PBBFAC | Performed by: PSYCHOLOGIST
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
## 2025-02-05 NOTE — PROGRESS NOTES
Outpatient Rehab    Physical Therapy Visit    Patient Name: Verena Toscano  MRN: [id number]  YOB: 1955  Today's Date: 2/5/2025    Therapy Diagnosis:   Encounter Diagnosis   Name Primary?    Right knee pain, unspecified chronicity Yes     Physician: Navi Fernandez III, *    Physician Orders: Eval and Treat  Medical Diagnosis:  Synovial cyst of popliteal space (Baker), right knee [M71.21], Unilateral primary osteoarthritis, right knee [M17.11], Pain in right knee [M25.561]      Visit # / Visits Authorized:  6 / 20   Date of Evaluation: 10/28/2024  Insurance Authorization Period: 1/1/2025 to 12/31/2025  Plan of Care Certification:  1/27/2025 to 3/31/2025      Time In: 1240   Time Out: 1330  Total Time: 50 minutes  Total Billable Time: 24 minutes         Subjective   Patient reports no new reports of pain since last visit..  Pain reported as 4/10.      Objective            Treatment:  Balance/Neuromuscular Re-Education  Balance/Neuromuscular Re-Education Activity 1: Quad series- Straight leg raises, long arc quas (4#): 3 x 10 reps  Balance/Neuromuscular Re-Education Activity 2: Lateral band walks (GreenTB): 5 laps with HHA, band around knees  Balance/Neuromuscular Re-Education Activity 4: NuStep for gait mechanics: 10 minutes. Level 1    Therapeutic Activity  Therapeutic Activity 1: Fwd step overs (large mari): 10 laps each alternating sides    Patient's spiritual, cultural, and educational needs considered and patient agreeable to plan of care and goals.     Assessment & Plan   Assessment: Verena presents to physical therapy with no new reports of pain. She reported aggravation in right ankle with green theraband around her ankle. Pain resolved with band adjusted around knees. She tolerated session well and demonstrates improved tolerance to exercises, especially with mari navigation. Continue to progress as tolerated.           Plan: Continue plan of care toward patient's goals.    Goals:    Active       Long Term Goals       Patient will be able to complete the TUG in </= 10 seconds with the least restrictive assistive device to decrease fall risk.  (Progressing)       Start:  01/27/25    Expected End:  03/31/25            Patient will demonstrate >/= 4/5 lower quarter strength to facilitate transfers from sit to stand from various surfaces without restriction. (Progressing)       Start:  01/27/25    Expected End:  03/31/25            Patient will improve 30 second sit to stand to at least 10x for improvement with transfer tasks. (Progressing)       Start:  01/27/25    Expected End:  03/31/25               Short Term Goals       Patient will reduce maximal pain rating to < 3/10 pain to facilitate ability to sleep through the night and recover from PT interventions. (Met)       Start:  01/27/25    Expected End:  02/28/25    Resolved:  01/27/25         Patient will be able to ambulate for at least 10 minutes with < 3/10 pain to improve walking tolerance.  (Progressing)       Start:  01/27/25    Expected End:  02/28/25            Patient will improve knee range of motion to at least 135 degrees for improved functional performance.  (Progressing)       Start:  01/27/25    Expected End:  02/28/25                Lois Hebert, PT, DPT

## 2025-02-11 ENCOUNTER — CLINICAL SUPPORT (OUTPATIENT)
Dept: REHABILITATION | Facility: HOSPITAL | Age: 70
End: 2025-02-11
Payer: MEDICARE

## 2025-02-11 DIAGNOSIS — M25.561 RIGHT KNEE PAIN, UNSPECIFIED CHRONICITY: Primary | ICD-10-CM

## 2025-02-11 PROCEDURE — 97112 NEUROMUSCULAR REEDUCATION: CPT

## 2025-02-11 PROCEDURE — 97530 THERAPEUTIC ACTIVITIES: CPT

## 2025-02-12 ENCOUNTER — OFFICE VISIT (OUTPATIENT)
Dept: CARDIOLOGY | Facility: CLINIC | Age: 70
End: 2025-02-12
Payer: MEDICARE

## 2025-02-12 VITALS
HEART RATE: 69 BPM | SYSTOLIC BLOOD PRESSURE: 118 MMHG | DIASTOLIC BLOOD PRESSURE: 73 MMHG | BODY MASS INDEX: 32.42 KG/M2 | HEIGHT: 66 IN | WEIGHT: 201.75 LBS

## 2025-02-12 DIAGNOSIS — E78.5 DYSLIPIDEMIA: ICD-10-CM

## 2025-02-12 DIAGNOSIS — I10 ESSENTIAL HYPERTENSION: Primary | ICD-10-CM

## 2025-02-12 DIAGNOSIS — Z13.6 ENCOUNTER FOR SCREENING FOR CARDIOVASCULAR DISORDERS: ICD-10-CM

## 2025-02-12 PROCEDURE — 99204 OFFICE O/P NEW MOD 45 MIN: CPT | Mod: S$PBB,,, | Performed by: INTERNAL MEDICINE

## 2025-02-12 PROCEDURE — 99213 OFFICE O/P EST LOW 20 MIN: CPT | Mod: PBBFAC | Performed by: INTERNAL MEDICINE

## 2025-02-12 PROCEDURE — 99999 PR PBB SHADOW E&M-EST. PATIENT-LVL III: CPT | Mod: PBBFAC,,, | Performed by: INTERNAL MEDICINE

## 2025-02-12 NOTE — PROGRESS NOTES
Subjective:   02/12/2025     Patient ID:  Verena Toscano is a 69 y.o. female who presents for evaulation of Hypertension and Dyslipidemia      Patient comes in, she does not have a history of heart problems.  She does have hypertension.  Her LDL cholesterol has been 120 in the past, no recent values.  She does have some recently done at [facility], results unavailable.      She does have hypertension, blood pressure today is normal, she will check her blood pressures at home.  She does not have diabetes.  She never smoked cigarettes.      She does have a family history of stroke.      She has not having exertional chest pains or tightness, no PND or orthopnea.              Past Medical History:   Diagnosis Date    GERD (gastroesophageal reflux disease)     Hypertension     Therapy        Review of patient's allergies indicates:   Allergen Reactions    Synvisc [hylan g-f 20] Other (See Comments)     Personal history of synviscitis following intra-articular Synvisc injection    Bactrim [sulfamethoxazole-trimethoprim] Other (See Comments)     Severe headaches. crystaline baldder    Demerol [meperidine] Nausea And Vomiting    Sulfa (sulfonamide antibiotics) Hives    Codeine Nausea And Vomiting    Morphine Nausea And Vomiting and Rash         Current Outpatient Medications:     acetaminophen (TYLENOL) 500 MG tablet, Take 1,000 mg by mouth every 6 (six) hours as needed for Pain., Disp: , Rfl:     ammonium lactate 12 % Crea, Apply 1 g topically Daily., Disp: 140 g, Rfl: 1    econazole nitrate 1 % cream, Daily., Disp: , Rfl:     estradiol (ESTRACE) 1 MG tablet, Take 1 mg by mouth once daily., Disp: , Rfl:     loratadine (CLARITIN) 10 mg tablet, Take 10 mg by mouth once daily., Disp: , Rfl:     methocarbamoL (ROBAXIN) 750 MG Tab, Take 750 mg by mouth once a week., Disp: , Rfl:     nystatin (MYCOSTATIN) cream, APPLY TO AFFECTED AREA TWICE A DAY IF NEEDED FOR ITCHING, Disp: , Rfl:     omeprazole (PRILOSEC) 10 MG capsule,  Take 10 mg by mouth once daily., Disp: , Rfl:     triamterene-hydrochlorothiazide 37.5-25 mg (DYAZIDE) 37.5-25 mg per capsule, Take 1 capsule by mouth every morning., Disp: , Rfl:      Objective:   Review of Systems   Cardiovascular:  Negative for chest pain, claudication, cyanosis, dyspnea on exertion, irregular heartbeat, leg swelling, near-syncope, orthopnea, palpitations, paroxysmal nocturnal dyspnea and syncope.         Vitals:    02/12/25 0820   BP: 118/73   Pulse: 69     Wt Readings from Last 3 Encounters:   02/12/25 91.5 kg (201 lb 11.5 oz)   01/27/25 87.7 kg (193 lb 5.5 oz)   11/26/24 87.7 kg (193 lb 5.5 oz)     Temp Readings from Last 3 Encounters:   04/25/24 98 °F (36.7 °C) (Oral)   01/14/24 98.2 °F (36.8 °C) (Oral)   01/14/24 98.3 °F (36.8 °C) (Oral)     BP Readings from Last 3 Encounters:   02/12/25 118/73   01/27/25 130/83   01/14/25 (!) 152/81     Pulse Readings from Last 3 Encounters:   02/12/25 69   01/14/25 65   11/26/24 70             Physical Exam  Vitals reviewed.   Constitutional:       General: She is not in acute distress.     Appearance: She is well-developed.   HENT:      Head: Normocephalic and atraumatic.      Nose: Nose normal.   Eyes:      Conjunctiva/sclera: Conjunctivae normal.      Pupils: Pupils are equal, round, and reactive to light.   Neck:      Vascular: No carotid bruit or JVD.   Cardiovascular:      Rate and Rhythm: Normal rate and regular rhythm.      Pulses: Intact distal pulses.      Heart sounds: Normal heart sounds. No murmur heard.     No friction rub. No gallop.   Pulmonary:      Effort: Pulmonary effort is normal. No respiratory distress.      Breath sounds: Normal breath sounds. No wheezing or rales.   Chest:      Chest wall: No tenderness.   Abdominal:      General: Bowel sounds are normal. There is no distension.      Palpations: Abdomen is soft.      Tenderness: There is no abdominal tenderness.   Musculoskeletal:         General: No tenderness or deformity. Normal  "range of motion.      Cervical back: Normal range of motion and neck supple.      Right lower leg: No edema.      Left lower leg: No edema.   Skin:     General: Skin is warm and dry.      Findings: No erythema or rash.   Neurological:      Mental Status: She is alert and oriented to person, place, and time.      Cranial Nerves: No cranial nerve deficit.      Motor: No abnormal muscle tone.      Coordination: Coordination normal.   Psychiatric:         Behavior: Behavior normal.         Thought Content: Thought content normal.         Judgment: Judgment normal.           Lab Results   Component Value Date    CHOL 187 12/21/2022     Lab Results   Component Value Date    HDL 53 12/21/2022     Lab Results   Component Value Date    LDLCALC 120 12/21/2022     No results found for: "ALT", "AST"  No results found for: "CREATININE", "BUN", "NA", "K", "CO2"  No results found for: "HGB", "HCT"                EKG shows normal sinus rhythm normal EKG      Assessment and Plan:     Essential hypertension  Comments:  I asked her to check blood pressures at home  Orders:  -     IN OFFICE EKG 12-LEAD (to [facility])    Dyslipidemia  Comments:  She will obtain recent laboratory results   Calcium score to be obtained to further define risk    Encounter for screening for cardiovascular disorders  -     CT Cardiac Scoring; Future; Expected date: 02/12/2025         No follow-ups on file.          Future Appointments   Date Time Provider Department Center   2/13/2025  8:00 AM Lois Hebert, PT [facility] [place] 2 fl   2/17/2025  8:00 AM Lois Hebert, PT [facility] [place] 2 fl   2/20/2025  8:00 AM Lois Hebert, PT [facility] [place] 2 fl   2/20/2025  2:30 PM Xavier Núñez, PhD [facility]   3/6/2025  8:00 AM Xavier Núñez, PhD [facility]   4/24/2025 11:40 AM [facility]             "

## 2025-02-13 ENCOUNTER — CLINICAL SUPPORT (OUTPATIENT)
Dept: REHABILITATION | Facility: HOSPITAL | Age: 70
End: 2025-02-13
Payer: MEDICARE

## 2025-02-13 DIAGNOSIS — M25.561 RIGHT KNEE PAIN, UNSPECIFIED CHRONICITY: Primary | ICD-10-CM

## 2025-02-13 PROCEDURE — 97112 NEUROMUSCULAR REEDUCATION: CPT

## 2025-02-13 PROCEDURE — 97530 THERAPEUTIC ACTIVITIES: CPT

## 2025-02-13 NOTE — PROGRESS NOTES
Outpatient Rehab    Physical Therapy Visit    Patient Name: Verena Toscano  MRN: [id number]  YOB: 1955  Today's Date: 2/13/2025    Therapy Diagnosis:   Encounter Diagnosis   Name Primary?    Right knee pain, unspecified chronicity Yes     Physician: Navi Fernandez III, *    Physician Orders: Eval and Treat  Medical Diagnosis:  Synovial cyst of popliteal space (Baker), right knee [M71.21], Unilateral primary osteoarthritis, right knee [M17.11], Pain in right knee [M25.561]      Visit # / Visits Authorized:  3 / 20   Date of Evaluation: 10/28/2024  Insurance Authorization Period: 1/1/2025 to 12/31/2025  Plan of Care Certification:  1/27/2025 to 3/31/2025      Time In: 1103   Time Out: 1159  Total Time: 56 minutes  Total Billable Time: 30 minutes         Subjective   Patient was able to get in touch with Medicare rep about lateral release brace..  Pain reported as 4/10.      Objective            Treatment:  Balance/Neuromuscular Re-Education  Balance/Neuromuscular Re-Education Activity 1: Quad series- Straight leg raises, long arc quas (4#): 3 x 10 reps  Balance/Neuromuscular Re-Education Activity 2: Lateral band walks (GreenTB): 5 laps with HHA, band around knees  Balance/Neuromuscular Re-Education Activity 4: NuStep for gait mechanics: 10 minutes. Level 1    Therapeutic Activity  Therapeutic Activity 1: Fwd step overs (large mari): 10 laps each alternating sides  Therapeutic Activity 3: Sit to stands (4#): 3 x 10 reps    Assessment & Plan   Assessment: Verena presents to physical therapy with no new reports of pain. She demonstrates improved mari navigation and decreased UE support for dynamic balance tasks. Continue to increase resistance with LE strengthening. Continue to progress as tolerated.  Evaluation/Treatment Tolerance: Patient tolerated treatment well    Patient will continue to benefit from skilled outpatient physical therapy to address the deficits listed in the problem list  box on initial evaluation, provide pt/family education and to maximize pt's level of independence in the home and community environment.     Patient's spiritual, cultural, and educational needs considered and patient agreeable to plan of care and goals.           Plan: Continue plan of care toward patient's goals.    Goals:   Active       Long Term Goals       Patient will be able to complete the TUG in </= 10 seconds with the least restrictive assistive device to decrease fall risk.  (Progressing)       Start:  01/27/25    Expected End:  03/31/25            Patient will demonstrate >/= 4/5 lower quarter strength to facilitate transfers from sit to stand from various surfaces without restriction. (Progressing)       Start:  01/27/25    Expected End:  03/31/25            Patient will improve 30 second sit to stand to at least 10x for improvement with transfer tasks. (Progressing)       Start:  01/27/25    Expected End:  03/31/25               Short Term Goals       Patient will reduce maximal pain rating to < 3/10 pain to facilitate ability to sleep through the night and recover from PT interventions. (Met)       Start:  01/27/25    Expected End:  02/28/25    Resolved:  01/27/25         Patient will be able to ambulate for at least 10 minutes with < 3/10 pain to improve walking tolerance.  (Progressing)       Start:  01/27/25    Expected End:  02/28/25            Patient will improve knee range of motion to at least 135 degrees for improved functional performance.  (Progressing)       Start:  01/27/25    Expected End:  02/28/25                Lois Hebert, PT, DPT

## 2025-02-17 ENCOUNTER — CLINICAL SUPPORT (OUTPATIENT)
Dept: REHABILITATION | Facility: HOSPITAL | Age: 70
End: 2025-02-17
Payer: MEDICARE

## 2025-02-17 DIAGNOSIS — M25.561 RIGHT KNEE PAIN, UNSPECIFIED CHRONICITY: Primary | ICD-10-CM

## 2025-02-17 PROCEDURE — 97112 NEUROMUSCULAR REEDUCATION: CPT

## 2025-02-17 PROCEDURE — 97530 THERAPEUTIC ACTIVITIES: CPT

## 2025-02-17 NOTE — PROGRESS NOTES
"  Outpatient Rehab    Physical Therapy Visit    Patient Name: Verena Toscano  MRN: [id number]  YOB: 1955  Today's Date: 2/17/2025    Therapy Diagnosis:   Encounter Diagnosis   Name Primary?    Right knee pain, unspecified chronicity Yes     Physician: Navi Fernandez III, *    Physician Orders: Eval and Treat  Medical Diagnosis:  Synovial cyst of popliteal space (Baker), right knee [M71.21], Unilateral primary osteoarthritis, right knee [M17.11], Pain in right knee [M25.561]      Visit # / Visits Authorized:  9 / 20   Date of Evaluation: 10/28/2024  Insurance Authorization Period: 1/1/2025 to 12/31/2025  Plan of Care Certification:  1/27/2025 to 3/31/2025     Time In: 0805   Time Out: 0902  Total Time: 57 minutes  Total Billable Time: 57 minutes         Subjective   Patient has increased reports of pain in right knee. She reports sitting on barstool over the weekend that was no comfortable..  Pain reported as 4/10.      Objective            Treatment:  Balance/Neuromuscular Re-Education  Balance/Neuromuscular Re-Education Activity 1: Quad series- Straight leg raises, long arc quas (5#), eccrentric: 3 x 10 reps  Balance/Neuromuscular Re-Education Activity 2: Lateral band walks (GreenTB): 3 laps with HHA, band around knees  Balance/Neuromuscular Re-Education Activity 4: NuStep for gait mechanics: 10 minutes. Level 1    Therapeutic Activity  Therapeutic Activity 3: Sit to stands (4#) with airex pad: 3 x 10 reps  Therapeutic Activity 4: Lateral step ups (4" step): 3 x 10 reps    Assessment & Plan   Assessment: Verena presents to physical therapy with reports of increased right knee pain. She tolerated today's session well with only a few reports of knee aches or sharp pinches in right knee. She tolerated increased resistance with quad and hip exercises. Will continue to progress as tolerated.  Evaluation/Treatment Tolerance: Patient tolerated treatment well    Patient will continue to benefit from " skilled outpatient physical therapy to address the deficits listed in the problem list box on initial evaluation, provide pt/family education and to maximize pt's level of independence in the home and community environment.     Patient's spiritual, cultural, and educational needs considered and patient agreeable to plan of care and goals.           Plan: Continue plan of care toward patient's goals.    Goals:   Active       Long Term Goals       Patient will be able to complete the TUG in </= 10 seconds with the least restrictive assistive device to decrease fall risk.  (Progressing)       Start:  01/27/25    Expected End:  03/31/25            Patient will demonstrate >/= 4/5 lower quarter strength to facilitate transfers from sit to stand from various surfaces without restriction. (Progressing)       Start:  01/27/25    Expected End:  03/31/25            Patient will improve 30 second sit to stand to at least 10x for improvement with transfer tasks. (Progressing)       Start:  01/27/25    Expected End:  03/31/25               Short Term Goals       Patient will reduce maximal pain rating to < 3/10 pain to facilitate ability to sleep through the night and recover from PT interventions. (Met)       Start:  01/27/25    Expected End:  02/28/25    Resolved:  01/27/25         Patient will be able to ambulate for at least 10 minutes with < 3/10 pain to improve walking tolerance.  (Progressing)       Start:  01/27/25    Expected End:  02/28/25            Patient will improve knee range of motion to at least 135 degrees for improved functional performance.  (Progressing)       Start:  01/27/25    Expected End:  02/28/25                Lois Hebert, PT, DPT

## 2025-02-17 NOTE — PROGRESS NOTES
Outpatient Rehab    Physical Therapy Visit    Patient Name: Verena Toscano  MRN: [id number]  YOB: 1955  Today's Date: 2/17/2025    Therapy Diagnosis:   Encounter Diagnosis   Name Primary?    Right knee pain, unspecified chronicity Yes     Physician: Navi Fernandez III, *    Physician Orders: Eval and Treat  Medical Diagnosis:  Synovial cyst of popliteal space (Baker), right knee [M71.21], Unilateral primary osteoarthritis, right knee [M17.11], Pain in right knee [M25.561]      Visit # / Visits Authorized:  9 / 20   Date of Evaluation: 10/28/2024  Insurance Authorization Period: 1/1/2025 to 12/31/2025  Plan of Care Certification:  1/27/2025 to 3/31/2025     Time In: 0826   Time Out: 0903  Total Time: 37 minutes  Total Billable Time: 24 minutes         Subjective   Patient late on arrival after taking a wrong turn to get here. No new reports of pain.         Objective            Treatment:  Balance/Neuromuscular Re-Education  Balance/Neuromuscular Re-Education Activity 1: Quad series- Straight leg raises, long arc quas (5#), eccrentric: 3 x 10 reps  Balance/Neuromuscular Re-Education Activity 4: NuStep for gait mechanics: 8 minutes. Level 1    Therapeutic Activity  Therapeutic Activity 1: Fwd step overs (large mari): 10 laps each alternating sides    Assessment & Plan   Assessment: Patient presents to physical therapy with no new reports of pain today. She tolerated today's session well. Demonstrated improved autonomy and independence with exercises. Continues to demonstrates signs of knee OA. She endorses that she and her  are signing up at their neighborhood gym to maintain physical activity. Will progress as tolerated.       Patient will continue to benefit from skilled outpatient physical therapy to address the deficits listed in the problem list box on initial evaluation, provide pt/family education and to maximize pt's level of independence in the home and community environment.      Patient's spiritual, cultural, and educational needs considered and patient agreeable to plan of care and goals.           Plan: Continue plan of care toward patient's goals.    Goals:   Active       Long Term Goals       Patient will be able to complete the TUG in </= 10 seconds with the least restrictive assistive device to decrease fall risk.  (Progressing)       Start:  01/27/25    Expected End:  03/31/25            Patient will demonstrate >/= 4/5 lower quarter strength to facilitate transfers from sit to stand from various surfaces without restriction. (Progressing)       Start:  01/27/25    Expected End:  03/31/25            Patient will improve 30 second sit to stand to at least 10x for improvement with transfer tasks. (Progressing)       Start:  01/27/25    Expected End:  03/31/25               Short Term Goals       Patient will reduce maximal pain rating to < 3/10 pain to facilitate ability to sleep through the night and recover from PT interventions. (Met)       Start:  01/27/25    Expected End:  02/28/25    Resolved:  01/27/25         Patient will be able to ambulate for at least 10 minutes with < 3/10 pain to improve walking tolerance.  (Progressing)       Start:  01/27/25    Expected End:  02/28/25            Patient will improve knee range of motion to at least 135 degrees for improved functional performance.  (Progressing)       Start:  01/27/25    Expected End:  02/28/25                Lois Hebert, PT, DPT

## 2025-02-20 ENCOUNTER — OFFICE VISIT (OUTPATIENT)
Dept: PSYCHIATRY | Facility: CLINIC | Age: 70
End: 2025-02-20
Payer: MEDICARE

## 2025-02-20 ENCOUNTER — CLINICAL SUPPORT (OUTPATIENT)
Dept: REHABILITATION | Facility: HOSPITAL | Age: 70
End: 2025-02-20
Payer: MEDICARE

## 2025-02-20 ENCOUNTER — RESULTS FOLLOW-UP (OUTPATIENT)
Dept: CARDIOLOGY | Facility: CLINIC | Age: 70
End: 2025-02-20
Payer: MEDICARE

## 2025-02-20 ENCOUNTER — HOSPITAL ENCOUNTER (OUTPATIENT)
Dept: RADIOLOGY | Facility: HOSPITAL | Age: 70
Discharge: HOME | End: 2025-02-20
Attending: INTERNAL MEDICINE
Payer: MEDICARE

## 2025-02-20 DIAGNOSIS — Z13.6 ENCOUNTER FOR SCREENING FOR CARDIOVASCULAR DISORDERS: ICD-10-CM

## 2025-02-20 DIAGNOSIS — F43.23 ADJUSTMENT DISORDER WITH MIXED ANXIETY AND DEPRESSED MOOD: Primary | ICD-10-CM

## 2025-02-20 DIAGNOSIS — M25.561 RIGHT KNEE PAIN, UNSPECIFIED CHRONICITY: Primary | ICD-10-CM

## 2025-02-20 PROCEDURE — 97112 NEUROMUSCULAR REEDUCATION: CPT

## 2025-02-20 PROCEDURE — 75571 CT HRT W/O DYE W/CA TEST: CPT | Mod: TC

## 2025-02-20 PROCEDURE — 97530 THERAPEUTIC ACTIVITIES: CPT

## 2025-02-20 PROCEDURE — 99211 OFF/OP EST MAY X REQ PHY/QHP: CPT | Mod: PBBFAC,25 | Performed by: PSYCHOLOGIST

## 2025-02-20 NOTE — PROGRESS NOTES
PSYCHO-ONCOLOGY NOTE/ Individual Psychotherapy     Date: 2/20/2025   Site of therapist:  [facility]          Therapeutic Intervention: Met with patient.  Outpatient - Behavior modifying psychotherapy 60 min - CPT code 84736  The patient's last visit with me was on 2/5/2025.      Problem list  Patient Active Problem List   Diagnosis    Cough    Cognitive complaints    Adjustment disorder with mixed anxiety and depressed mood    Cervical vertigo    Dyslipidemia    Essential hypertension    Hormone replacement therapy    Intertrigo    Neck pain    Vitamin D deficiency    Vulvitis    Caregiver stress    Right knee pain       Chief complaint/reason for encounter: anxiety, depression  Met with patient to evaluate psychosocial adaptation to decreased mobility, caregiving    Current Medications  Current Outpatient Medications   Medication    acetaminophen (TYLENOL) 500 MG tablet    ammonium lactate 12 % Crea    econazole nitrate 1 % cream    estradiol (ESTRACE) 1 MG tablet    loratadine (CLARITIN) 10 mg tablet    methocarbamoL (ROBAXIN) 750 MG Tab    nystatin (MYCOSTATIN) cream    omeprazole (PRILOSEC) 10 MG capsule    triamterene-hydrochlorothiazide 37.5-25 mg (DYAZIDE) 37.5-25 mg per capsule     No current facility-administered medications for this visit.       Objective:  Verena Toscano arrived promptly for the session.  Ms. Toscano was independently ambulatory at the time of session. The patient was fully cooperative throughout the session.  Appearance: age appropriate, casually  dressed, well groomed  Behavior/Cooperation: friendly and cooperative  Speech: normal in rate, volume, and tone and appropriate quality, quantity and organization of sentences  Mood: dysthymic  Affect: tearful  Thought Process: goal-directed, logical  Thought Content: normal,  No delusions or paranoia; did not appear to be responding to internal stimuli during the session  Orientation: grossly intact  Memory: Grossly  "intact  Attention Span/Concentration: Attends to session without distraction; reports no difficulty  Fund of Knowledge: average  Estimate of Intelligence: average from verbal skills and history  Cognition: grossly intact  Insight: patient has awareness of illness; good insight into own behavior and behavior of others  Judgment: the patient's behavior is adequate to circumstances    Interval history and content of current session: Patient discussed events and activities since the time of last visit. She discussed her own health challenges and her emotional  reaction to her physical limitations.  She discussed her automatic emotional reaction to  challenges in physical therapy ("I'm a failure... I'm not measuring up").  Discussed her negative automatic thoughts about poor health or mobility. Discussed acute pain coping v chronic pain coping.    Discussed financial decisions and anticipation of future house sale.     Prior: discussion of sexual changes: Enjoyable and fulfilling sex life prior to 's illness. Historical use of Viagra, Cialis. 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 Since Padcev, his ability to have an erection (or ejaculate without an erection) is almost gone. She feels his interest is low (although they still cuddle and show other physical affection). She wonders about his thinking about their change in interaction and whether he is interested in other potential assistive devices for intercourse. (Her drive has not changed, is comfortable masturbating, unsure how he thinks about her sexual interest).     Risk parameters:   Patient reports no suicidal ideation  Patient reports no homicidal ideation  Patient reports no self-injurious behavior  Patient reports no violent behavior   Safety needs:  None at this time      Verbal deficits: None     Patient's response to intervention:The patient's response to " intervention is accepting, motivated.     Progress toward goals: Progress as Expected        Patient reported outcomes:      Distress thermometer:       2/20/2025     5:23 PM 2/5/2025     8:02 PM 1/22/2025     2:43 PM 1/2/2025    10:59 AM 11/20/2024    11:14 AM 11/6/2024     9:35 AM 10/15/2024    11:21 AM   DISTRESS SCREENING   Distress Score 7 1 4 7 2 6 3   Practical Concerns Taking care of myself;Taking care of others;Work;Safety;Finances  Taking care of myself;Work;Finances  Taking care of myself;Taking care of others;Work;Finances;Access to medicine;Treatment decisions;None of these  Taking care of others;Work;Finances  Taking care of myself;Taking care of others;Work;Finances  Taking care of myself;Taking care of others;Work;Finances;Treatment decisions    Social Concerns None of these  Relationship with family members Communication with health care team Relationship with spouse or partner;Relationship with children None of these None of these   Emotional Concerns Worry or anxiety;Sadness or depression;Grief or loss;Fear;Feelings of worthlessness or being a burden  Worry or anxiety;Grief or loss;Feelings of worthlessness or being a burden Worry or anxiety;Fear;Changes in appearance Worry or anxiety;Sadness or depression Worry or anxiety;Grief or loss;Fear Worry or anxiety;Fear   Spiritual or Tenriism Concerns None of these  None of these None of these None of these None of these None of these   Physical Concerns Pain;Fatigue;Changes in eating;Loss or change of physical abilities  Pain;Fatigue;Changes in eating Pain;Fatigue;Loss or change of physical abilities Pain;Fatigue;Memory or concentration;Loss or change of physical abilities Pain;Fatigue;Loss or change of physical abilities Pain;Fatigue;Loss or change of physical abilities       Data saved with a previous flowsheet row definition           PHQ-9=7   ; Initial visit: 7       DEANNA-7=6; Initial visit:6       1/22/2025     2:49 PM 1/2/2025    11:00 AM  "11/20/2024    11:15 AM   GAD7   1. Feeling nervous, anxious, or on edge? 1 1 1   2. Not being able to stop or control worrying? 1 0 0   3. Worrying too much about different things? 1 1 1   4. Trouble relaxing? 0 0 0   5. Being so restless that it is hard to sit still? 0 0 0   6. Becoming easily annoyed or irritable? 1 1 1   7. Feeling afraid as if something awful might happen? 1 1 0   8. If you checked off any problems, how difficult have these problems made it for you to do your work, take care of things at home, or get along with other people? 1 1 1   DEANNA-7 Score 5 4  3       Patient-reported          Client Strengths: verbal, intelligent, successful, good social support, good insight, commitment to wellness      Treatment Plan:individual psychotherapy  Target symptoms: adjustment  Why chosen therapy is appropriate versus another modality: relevant to diagnosis, patient responds to this modality, evidence based practice  Outcome monitoring methods: self-report, checklist/rating scale  Therapeutic intervention type: behavior modifying psychotherapy  Prognosis: Good    Goals from last visit: Met   Behavioral goals prior to next visit:    Exercise: investigate gym/Silver [employer]neakers   Stress management: advocate for self on health    PMR, lie down more frequently, modify goals/activities (e.g., timer)   Social engagement:   Nutrition:    Smoking Cessation:   Therapy: positive self-statements when in pain ("I'm strong" "Hung helps me")          Return to clinic: 2 weeks     Length of Service (minutes direct face-to-face contact):60    Diagnosis:     ICD-10-CM ICD-9-CM   1. Adjustment disorder with mixed anxiety and depressed mood  F43.23 309.28         Xavier Núñez, PhD  [place] License #[id number]  [place] License #[id number]                "

## 2025-02-21 NOTE — PROGRESS NOTES
Outpatient Rehab    Physical Therapy Visit    Patient Name: Verena Toscano  MRN: [id number]  YOB: 1955  Today's Date: 2/21/2025    Therapy Diagnosis:   Encounter Diagnosis   Name Primary?    Right knee pain, unspecified chronicity Yes     Physician: Navi Fernandez III, *    Physician Orders: Eval and Treat  Medical Diagnosis:  Synovial cyst of popliteal space (Baker), right knee [M71.21], Unilateral primary osteoarthritis, right knee [M17.11], Pain in right knee [M25.561]      Visit # / Visits Authorized:  10 / 20   Date of Evaluation: 10/28/2024  Insurance Authorization Period: 1/1/2025 to 12/31/2025  Plan of Care Certification:  1/27/2025 to 3/31/2025     Time In: 0803   Time Out: 0858  Total Time: 55 minutes  Total Billable Time: 55 minutes       Subjective   Patient reports feeling like all her joint are aching. After performing knee extension exercise, patient became upset and reported pain in right knee..  Pain reported as 4/10.      Objective            Treatment:  Balance/Neuromuscular Re-Education  Balance/Neuromuscular Re-Education Activity 1: Quad series- Straight leg raises, long arc quas (3#), eccrentric: 3 x 10 reps  Balance/Neuromuscular Re-Education Activity 4: NuStep for gait mechanics: 8 minutes. Level 1  Balance/Neuromuscular Re-Education Activity 5: Knee extension machine: 1x10 reps - right knee pain reported  Balance/Neuromuscular Re-Education Activity 6: Education on activity modification, fear-based movement, osteoarthritis    Therapeutic Activity  Therapeutic Activity 3: Sit to stands (4#) with airex pad: 3 x 10 reps    Assessment & Plan   Assessment: Verena presents to physical therapy with reports of increased joint pain in hips and knees. After performing knee extension machine exercise, patient reported knee pain. Exercise terminated, and pain resolved after. Patient demonstrates decreased BLE strength. WIll continue to progress as tolerated.       Patient will  continue to benefit from skilled outpatient physical therapy to address the deficits listed in the problem list box on initial evaluation, provide pt/family education and to maximize pt's level of independence in the home and community environment.     Patient's spiritual, cultural, and educational needs considered and patient agreeable to plan of care and goals.           Plan: Continue plan of care toward patient's goals.    Goals:   Active       Long Term Goals       Patient will be able to complete the TUG in </= 10 seconds with the least restrictive assistive device to decrease fall risk.  (Progressing)       Start:  01/27/25    Expected End:  03/31/25            Patient will demonstrate >/= 4/5 lower quarter strength to facilitate transfers from sit to stand from various surfaces without restriction. (Progressing)       Start:  01/27/25    Expected End:  03/31/25            Patient will improve 30 second sit to stand to at least 10x for improvement with transfer tasks. (Progressing)       Start:  01/27/25    Expected End:  03/31/25               Short Term Goals       Patient will reduce maximal pain rating to < 3/10 pain to facilitate ability to sleep through the night and recover from PT interventions. (Met)       Start:  01/27/25    Expected End:  02/28/25    Resolved:  01/27/25         Patient will be able to ambulate for at least 10 minutes with < 3/10 pain to improve walking tolerance.  (Progressing)       Start:  01/27/25    Expected End:  02/28/25            Patient will improve knee range of motion to at least 135 degrees for improved functional performance.  (Progressing)       Start:  01/27/25    Expected End:  02/28/25                Lois Hebert, PT, DPT

## 2025-02-26 ENCOUNTER — CLINICAL SUPPORT (OUTPATIENT)
Dept: REHABILITATION | Facility: HOSPITAL | Age: 70
End: 2025-02-26
Payer: MEDICARE

## 2025-02-26 DIAGNOSIS — M25.561 RIGHT KNEE PAIN, UNSPECIFIED CHRONICITY: Primary | ICD-10-CM

## 2025-02-26 PROCEDURE — 97112 NEUROMUSCULAR REEDUCATION: CPT | Mod: KX

## 2025-02-26 PROCEDURE — 97530 THERAPEUTIC ACTIVITIES: CPT | Mod: KX

## 2025-02-26 NOTE — PROGRESS NOTES
Outpatient Rehab    Physical Therapy Visit    Patient Name: Verena Toscano  MRN: [id number]  YOB: 1955  Encounter Date: 2/26/2025    Therapy Diagnosis:   Encounter Diagnosis   Name Primary?    Right knee pain, unspecified chronicity Yes     Physician: Navi Fernandez III, *    Physician Orders: Eval and Treat  Medical Diagnosis:  Synovial cyst of popliteal space (Baker), right knee [M71.21], Unilateral primary osteoarthritis, right knee [M17.11], Pain in right knee [M25.561]      Visit # / Visits Authorized: 11 / 20   Date of Evaluation: 10/28/2024  Insurance Authorization Period: 1/1/2025 to 12/31/2025  Plan of Care Certification:  1/27/2025 to 3/31/2025      Time In: 1002   Time Out: 1056  Total Time: 54 minutes  Total Billable Time: 54 minutes    FOTO: See media section.        Subjective   She feels like she is having a good day and is deciding to not get down about her knee pain..         Objective            Treatment:  Balance/Neuromuscular Re-Education  Balance/Neuromuscular Re-Education Activity 1: Long arc quas 5#, eccrentric: 3 x 10 reps (green strap for right knee)  Balance/Neuromuscular Re-Education Activity 4: NuStep for gait mechanics: 10 minutes. Level 1  Education on activity modification and physical activity regimen    Therapeutic Activity  Therapeutic Activity 1: Lateral mari step overs (large mari): 3 x 1 minute rounds  Therapeutic Activity 3: Sit to stands (4#) : 3 x 10 reps    Assessment & Plan   Assessment: Verena presents to physical therapy with reports with no new reports of pain in right knee. She tolerated today's session well and performs best when not counting or focusing only on the exercise at hand.  WIll continue to progress as tolerated.       Patient will continue to benefit from skilled outpatient physical therapy to address the deficits listed in the problem list box on initial evaluation, provide pt/family education and to maximize pt's level of  independence in the home and community environment.     Patient's spiritual, cultural, and educational needs considered and patient agreeable to plan of care and goals.           Plan: Continue plan of care toward patient's goals.    Goals:   Active       Long Term Goals       Patient will be able to complete the TUG in </= 10 seconds with the least restrictive assistive device to decrease fall risk.  (Progressing)       Start:  01/27/25    Expected End:  03/31/25            Patient will demonstrate >/= 4/5 lower quarter strength to facilitate transfers from sit to stand from various surfaces without restriction. (Progressing)       Start:  01/27/25    Expected End:  03/31/25            Patient will improve 30 second sit to stand to at least 10x for improvement with transfer tasks. (Progressing)       Start:  01/27/25    Expected End:  03/31/25               Short Term Goals       Patient will reduce maximal pain rating to < 3/10 pain to facilitate ability to sleep through the night and recover from PT interventions. (Met)       Start:  01/27/25    Expected End:  02/28/25    Resolved:  01/27/25         Patient will be able to ambulate for at least 10 minutes with < 3/10 pain to improve walking tolerance.  (Progressing)       Start:  01/27/25    Expected End:  02/28/25            Patient will improve knee range of motion to at least 135 degrees for improved functional performance.  (Progressing)       Start:  01/27/25    Expected End:  02/28/25                Lois Hebert, PT, DPT

## 2025-03-05 ENCOUNTER — PATIENT MESSAGE (OUTPATIENT)
Dept: SPORTS MEDICINE | Facility: CLINIC | Age: 70
End: 2025-03-05
Payer: MEDICARE

## 2025-03-06 ENCOUNTER — OFFICE VISIT (OUTPATIENT)
Dept: PSYCHIATRY | Facility: CLINIC | Age: 70
End: 2025-03-06
Payer: MEDICARE

## 2025-03-06 DIAGNOSIS — F43.23 ADJUSTMENT DISORDER WITH MIXED ANXIETY AND DEPRESSED MOOD: Primary | ICD-10-CM

## 2025-03-06 DIAGNOSIS — M25.561 RIGHT KNEE PAIN, UNSPECIFIED CHRONICITY: ICD-10-CM

## 2025-03-06 PROCEDURE — 90834 PSYTX W PT 45 MINUTES: CPT | Mod: ,,, | Performed by: PSYCHOLOGIST

## 2025-03-06 NOTE — PROGRESS NOTES
PSYCHO-ONCOLOGY NOTE/ Individual Psychotherapy     Date: 3/6/2025   Site of therapist:  [facility]          Therapeutic Intervention: Met with patient.  Outpatient - Behavior modifying psychotherapy 45 min - CPT code 78246  The patient's last visit with me was on 2/20/2025.    Problem list  Patient Active Problem List   Diagnosis    Cough    Cognitive complaints    Adjustment disorder with mixed anxiety and depressed mood    Cervical vertigo    Dyslipidemia    Essential hypertension    Hormone replacement therapy    Intertrigo    Neck pain    Vitamin D deficiency    Vulvitis    Caregiver stress    Right knee pain       Chief complaint/reason for encounter: anxiety, depression  Met with patient to evaluate psychosocial adaptation to decreased mobility, caregiving    Current Medications  Current Outpatient Medications   Medication    acetaminophen (TYLENOL) 500 MG tablet    ammonium lactate 12 % Crea    econazole nitrate 1 % cream    estradiol (ESTRACE) 1 MG tablet    loratadine (CLARITIN) 10 mg tablet    methocarbamoL (ROBAXIN) 750 MG Tab    nystatin (MYCOSTATIN) cream    omeprazole (PRILOSEC) 10 MG capsule    triamterene-hydrochlorothiazide 37.5-25 mg (DYAZIDE) 37.5-25 mg per capsule     No current facility-administered medications for this visit.       Objective:  Verena Toscano arrived promptly for the session.  Ms. Toscano was independently ambulatory at the time of session. The patient was fully cooperative throughout the session.  Appearance: age appropriate, casually  dressed, well groomed  Behavior/Cooperation: friendly and cooperative  Speech: normal in rate, volume, and tone and appropriate quality, quantity and organization of sentences  Mood: dysthymic  Affect: tearful  Thought Process: goal-directed, logical  Thought Content: normal,  No delusions or paranoia; did not appear to be responding to internal stimuli during the session  Orientation: grossly intact  Memory: Grossly  intact  Attention Span/Concentration: Attends to session without distraction; reports no difficulty  Fund of Knowledge: average  Estimate of Intelligence: average from verbal skills and history  Cognition: grossly intact  Insight: patient has awareness of illness; good insight into own behavior and behavior of others  Judgment: the patient's behavior is adequate to circumstances    Interval history and content of current session: Patient discussed events and activities since the time of last visit. She discussed her own health challenges and her emotional  reaction to her physical limitations. Discussed her negative automatic thoughts about poor health or mobility. Discussed acute pain coping v chronic pain coping.  Encouraged patient to discuss options with orthopedics and begin planning for potential interventions.    Discussed pregnancy of daughter.    Discussed patient's hesitance to be assertive when others treat her rudely.    Prior: discussion of sexual changes: Enjoyable and fulfilling sex life prior to 's illness. Historical use of Viagra, Cialis. 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 Since Padcev, his ability to have an erection (or ejaculate without an erection) is almost gone. She feels his interest is low (although they still cuddle and show other physical affection). She wonders about his thinking about their change in interaction and whether he is interested in other potential assistive devices for intercourse. (Her drive has not changed, is comfortable masturbating, unsure how he thinks about her sexual interest).     Risk parameters:   Patient reports no suicidal ideation  Patient reports no homicidal ideation  Patient reports no self-injurious behavior  Patient reports no violent behavior   Safety needs:  None at this time      Verbal deficits: None     Patient's response to intervention:The patient's  response to intervention is accepting, motivated.     Progress toward goals: Progress as Expected        Patient reported outcomes:      Distress thermometer:       3/4/2025     3:10 PM 2/20/2025     5:23 PM 2/5/2025     8:02 PM 1/22/2025     2:43 PM 1/2/2025    10:59 AM 11/20/2024    11:14 AM 11/6/2024     9:35 AM   DISTRESS SCREENING   Distress Score 3 7 1 4 7 2 6   Practical Concerns Taking care of others;Finances;Safety Taking care of myself;Taking care of others;Work;Safety;Finances  Taking care of myself;Work;Finances  Taking care of myself;Taking care of others;Work;Finances;Access to medicine;Treatment decisions;None of these  Taking care of others;Work;Finances  Taking care of myself;Taking care of others;Work;Finances    Social Concerns Relationship with children None of these  Relationship with family members Communication with health care team Relationship with spouse or partner;Relationship with children None of these   Emotional Concerns Fear;Changes in appearance;Feelings of worthlessness or being a burden Worry or anxiety;Sadness or depression;Grief or loss;Fear;Feelings of worthlessness or being a burden  Worry or anxiety;Grief or loss;Feelings of worthlessness or being a burden Worry or anxiety;Fear;Changes in appearance Worry or anxiety;Sadness or depression Worry or anxiety;Grief or loss;Fear   Spiritual or Yazidism Concerns None of these None of these  None of these None of these None of these None of these   Physical Concerns Pain;Fatigue;Loss or change of physical abilities Pain;Fatigue;Changes in eating;Loss or change of physical abilities  Pain;Fatigue;Changes in eating Pain;Fatigue;Loss or change of physical abilities Pain;Fatigue;Memory or concentration;Loss or change of physical abilities Pain;Fatigue;Loss or change of physical abilities       Data saved with a previous flowsheet row definition           PHQ-9=7PHQ-9 Total Score: (Patient-Rptd) (P) 4 (03/04/25 9960)  ; Initial visit: 7        DEANNA-7=6; Initial visit:6       3/4/2025     4:27 PM 1/22/2025     2:49 PM 1/2/2025    11:00 AM   GAD7   1. Feeling nervous, anxious, or on edge? 1 1 1   2. Not being able to stop or control worrying? 0 1 0   3. Worrying too much about different things? 1 1 1   4. Trouble relaxing? 0 0 0   5. Being so restless that it is hard to sit still? 0 0 0   6. Becoming easily annoyed or irritable? 1 1 1   7. Feeling afraid as if something awful might happen? 1 1 1   8. If you checked off any problems, how difficult have these problems made it for you to do your work, take care of things at home, or get along with other people? 1 1 1   DEANNA-7 Score 4  5 4        Patient-reported          Client Strengths: verbal, intelligent, successful, good social support, good insight, commitment to wellness      Treatment Plan:individual psychotherapy  Target symptoms: adjustment  Why chosen therapy is appropriate versus another modality: relevant to diagnosis, patient responds to this modality, evidence based practice  Outcome monitoring methods: self-report, checklist/rating scale  Therapeutic intervention type: behavior modifying psychotherapy  Prognosis: Good    Goals from last visit: Met   Behavioral goals prior to next visit:    Exercise: investigate gym/Silver Sneakers   Stress management: advocate for self on health   Social engagement:  assertiveness with needs   Nutrition:    Smoking Cessation:   Therapy: investigate knee options with ortho          Return to clinic: 2 weeks     Length of Service (minutes direct face-to-face contact):45    Diagnosis:     ICD-10-CM ICD-9-CM   1. Adjustment disorder with mixed anxiety and depressed mood  F43.23 309.28   2. Right knee pain, unspecified chronicity  M25.561 719.46         Xavier Núñez, PhD  [place] License #[id number]  [place] License #[id number]

## 2025-03-09 ENCOUNTER — PATIENT MESSAGE (OUTPATIENT)
Dept: REHABILITATION | Facility: HOSPITAL | Age: 70
End: 2025-03-09
Payer: MEDICARE

## 2025-03-19 ENCOUNTER — CLINICAL SUPPORT (OUTPATIENT)
Dept: REHABILITATION | Facility: HOSPITAL | Age: 70
End: 2025-03-19
Payer: MEDICARE

## 2025-03-19 ENCOUNTER — OFFICE VISIT (OUTPATIENT)
Dept: PSYCHIATRY | Facility: CLINIC | Age: 70
End: 2025-03-19
Payer: MEDICARE

## 2025-03-19 DIAGNOSIS — F43.23 ADJUSTMENT DISORDER WITH MIXED ANXIETY AND DEPRESSED MOOD: Primary | ICD-10-CM

## 2025-03-19 DIAGNOSIS — M25.561 RIGHT KNEE PAIN, UNSPECIFIED CHRONICITY: Primary | ICD-10-CM

## 2025-03-19 PROCEDURE — 99999 PR PBB SHADOW E&M-EST. PATIENT-LVL I: CPT | Mod: PBBFAC,,, | Performed by: PSYCHOLOGIST

## 2025-03-19 PROCEDURE — 90834 PSYTX W PT 45 MINUTES: CPT | Mod: ,,, | Performed by: PSYCHOLOGIST

## 2025-03-19 PROCEDURE — 99211 OFF/OP EST MAY X REQ PHY/QHP: CPT | Mod: PBBFAC | Performed by: PSYCHOLOGIST

## 2025-03-19 NOTE — PROGRESS NOTES
Outpatient Rehab    Physical Therapy Discharge    Patient Name: Verena Toscano  MRN: [id number]  YOB: 1955  Encounter Date: 3/19/2025    Therapy Diagnosis:   Encounter Diagnosis   Name Primary?    Right knee pain, unspecified chronicity Yes     Physician: Navi Fernandez III, *    Physician Orders: Eval and Treat  Medical Diagnosis: Synovial cyst of popliteal space ([place]aker), right knee  Unilateral primary osteoarthritis, right knee  Pain in right knee    Visit # / Visits Authorized: 12 / 20  Date of Evaluation: 10/28/2024  Insurance Authorization Period: 1/1/2025 to 12/31/2025  Plan of Care Certification:  1/27/2025 to 3/31/2025      PT/PTA: PT   Number of PTA visits since last PT visit:0  Time In: 0855   Time Out: 0953  Total Time: 58 minutes  Total Billable Time: 58 minutes    FOTO: See media section.          Subjective   Patient is returning to MD to explore second opinion for knee surgeries. Patient is agreeable to discharge at this time..  Pain reported as 4/10.      Objective       Knee Range of Motion   Right Knee   Active (deg) Passive (deg) Pain   Flexion 112   Yes   Extension 0                       Treatment:  Balance/Neuromuscular Re-Education  NMR 2: Standing heel raises: 3 x 10 reps, 3 second holds  NMR 3: Standing hip extensions: 3 x 10 reps; standing hip abduction walks (RedTB): 3 x 5 reps each side  NMR 4: NuStep for gait mechanics: 10 minutes. Level 1  Therapeutic Activity  TA 3: Sit to stands : 3 x 10 reps  TA 4: Education on surgical and conservative care for knee pain    Time Entry(in minutes):       Assessment & Plan   Assessment: Verena presents to physical therapy with complaints of persistent right knee pain. She has scheduled MD appt to discuss possible surgical interventions for knee pain. PT and patient are agreeable to discharge at this time until after MD visit. Patient discharged with home exercise program to be performed every other day.  Evaluation/Treatment  Tolerance: Patient limited by pain    Patient's spiritual, cultural, and educational needs considered and patient agreeable to plan of care and goals.           Plan: Patient discharged from outpatient PT at this time.    Goals:   Active       Long Term Goals       Patient will be able to complete the TUG in </= 10 seconds with the least restrictive assistive device to decrease fall risk.  (Progressing)       Start:  01/27/25    Expected End:  03/31/25            Patient will demonstrate >/= 4/5 lower quarter strength to facilitate transfers from sit to stand from various surfaces without restriction. (Progressing)       Start:  01/27/25    Expected End:  03/31/25            Patient will improve 30 second sit to stand to at least 10x for improvement with transfer tasks. (Progressing)       Start:  01/27/25    Expected End:  03/31/25               Short Term Goals       Patient will reduce maximal pain rating to < 3/10 pain to facilitate ability to sleep through the night and recover from PT interventions. (Met)       Start:  01/27/25    Expected End:  02/28/25    Resolved:  01/27/25         Patient will be able to ambulate for at least 10 minutes with < 3/10 pain to improve walking tolerance.  (Progressing)       Start:  01/27/25    Expected End:  02/28/25            Patient will improve knee range of motion to at least 135 degrees for improved functional performance.  (Unable to Meet)       Start:  01/27/25    Expected End:  02/28/25                Lois Hebert, PT, DPT     3

## 2025-03-19 NOTE — PROGRESS NOTES
PSYCHO-ONCOLOGY NOTE/ Individual Psychotherapy     Date: 3/19/2025   Site of therapist:  [facility]          Therapeutic Intervention: Met with patient.  Outpatient - Behavior modifying psychotherapy 45 min - CPT code 44017  The patient's last visit with me was on 3/6/2025.      Problem list  Patient Active Problem List   Diagnosis    Cough    Cognitive complaints    Adjustment disorder with mixed anxiety and depressed mood    Cervical vertigo    Dyslipidemia    Essential hypertension    Hormone replacement therapy    Intertrigo    Neck pain    Vitamin D deficiency    Vulvitis    Caregiver stress    Right knee pain       Chief complaint/reason for encounter: anxiety, depression  Met with patient to evaluate psychosocial adaptation to decreased mobility, caregiving    Current Medications  Current Outpatient Medications   Medication    acetaminophen (TYLENOL) 500 MG tablet    ammonium lactate 12 % Crea    econazole nitrate 1 % cream    estradiol (ESTRACE) 1 MG tablet    loratadine (CLARITIN) 10 mg tablet    methocarbamoL (ROBAXIN) 750 MG Tab    nystatin (MYCOSTATIN) cream    omeprazole (PRILOSEC) 10 MG capsule    triamterene-hydrochlorothiazide 37.5-25 mg (DYAZIDE) 37.5-25 mg per capsule     No current facility-administered medications for this visit.       Objective:  Verena Toscano arrived promptly for the session.  Ms. Toscano was independently ambulatory at the time of session. The patient was fully cooperative throughout the session.  Appearance: age appropriate, casually  dressed, well groomed  Behavior/Cooperation: friendly and cooperative  Speech: normal in rate, volume, and tone and appropriate quality, quantity and organization of sentences  Mood: dysthymic  Affect: sad  Thought Process: goal-directed, logical  Thought Content: normal,  No delusions or paranoia; did not appear to be responding to internal stimuli during the session  Orientation: grossly intact  Memory: Grossly intact  Attention  "Span/Concentration: Attends to session without distraction; reports no difficulty  Fund of Knowledge: average  Estimate of Intelligence: average from verbal skills and history  Cognition: grossly intact  Insight: patient has awareness of illness; good insight into own behavior and behavior of others  Judgment: the patient's behavior is adequate to circumstances    Interval history and content of current session: Patient discussed events and activities since the time of last visit. She discussed her own health challenges and her emotional  reaction to her physical limitations. Discussed her negative automatic thoughts about being "lazy" when she does not complete as many tasks as desired.     Patient is seeking out 2nd opinion on knee intervention.    Sleep is difficult for her- waking at 4 AM when she typically sleeps later. Discussed sleep hygiene.    Prior: discussion of sexual changes: Enjoyable and fulfilling sex life prior to 's illness. Historical use of Viagra, Cialis. 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 Since Padcev, his ability to have an erection (or ejaculate without an erection) is almost gone. She feels his interest is low (although they still cuddle and show other physical affection). She wonders about his thinking about their change in interaction and whether he is interested in other potential assistive devices for intercourse. (Her drive has not changed, is comfortable masturbating, unsure how he thinks about her sexual interest).     Risk parameters:   Patient reports no suicidal ideation  Patient reports no homicidal ideation  Patient reports no self-injurious behavior  Patient reports no violent behavior   Safety needs:  None at this time      Verbal deficits: None     Patient's response to intervention:The patient's response to intervention is accepting, motivated.     Progress toward goals: Progress " as Expected        Patient reported outcomes:      Distress thermometer:       3/19/2025    12:57 PM 3/4/2025     3:10 PM 2/20/2025     5:23 PM 2/5/2025     8:02 PM 1/22/2025     2:43 PM 1/2/2025    10:59 AM 11/20/2024    11:14 AM   DISTRESS SCREENING   Distress Score 4 3 7 1 4 7 2   Practical Concerns Taking care of myself;Taking care of others;Work;Finances;Treatment decisions;Safety Taking care of others;Finances;Safety Taking care of myself;Taking care of others;Work;Safety;Finances  Taking care of myself;Work;Finances  Taking care of myself;Taking care of others;Work;Finances;Access to medicine;Treatment decisions;None of these  Taking care of others;Work;Finances    Social Concerns None of these Relationship with children None of these  Relationship with family members Communication with health care team Relationship with spouse or partner;Relationship with children   Emotional Concerns Worry or anxiety;Sadness or depression;Anger;Feelings of worthlessness or being a burden Fear;Changes in appearance;Feelings of worthlessness or being a burden Worry or anxiety;Sadness or depression;Grief or loss;Fear;Feelings of worthlessness or being a burden  Worry or anxiety;Grief or loss;Feelings of worthlessness or being a burden Worry or anxiety;Fear;Changes in appearance Worry or anxiety;Sadness or depression   Spiritual or Hindu Concerns None of these None of these None of these  None of these None of these None of these   Physical Concerns Pain;Sleep;Fatigue;None of these Pain;Fatigue;Loss or change of physical abilities Pain;Fatigue;Changes in eating;Loss or change of physical abilities  Pain;Fatigue;Changes in eating Pain;Fatigue;Loss or change of physical abilities Pain;Fatigue;Memory or concentration;Loss or change of physical abilities       Data saved with a previous flowsheet row definition           PHQ-9=7   ; Initial visit: 7       DEANNA-7=6; Initial visit:6       3/4/2025     4:27 PM 1/22/2025     2:49 PM  1/2/2025    11:00 AM   GAD7   1. Feeling nervous, anxious, or on edge? 1 1 1   2. Not being able to stop or control worrying? 0 1 0   3. Worrying too much about different things? 1 1 1   4. Trouble relaxing? 0 0 0   5. Being so restless that it is hard to sit still? 0 0 0   6. Becoming easily annoyed or irritable? 1 1 1   7. Feeling afraid as if something awful might happen? 1 1 1   8. If you checked off any problems, how difficult have these problems made it for you to do your work, take care of things at home, or get along with other people? 1 1 1   DEANNA-7 Score 4  5 4        Patient-reported          Client Strengths: verbal, intelligent, successful, good social support, good insight, commitment to wellness      Treatment Plan:individual psychotherapy  Target symptoms: adjustment  Why chosen therapy is appropriate versus another modality: relevant to diagnosis, patient responds to this modality, evidence based practice  Outcome monitoring methods: self-report, checklist/rating scale  Therapeutic intervention type: behavior modifying psychotherapy  Prognosis: Good    Goals from last visit: Met   Behavioral goals prior to next visit:    Exercise: pre-hab for knee   Stress management: investigate knee options with ortho- 2nd opinion   Social engagement: transfer telephone bill to [place]   Nutrition:    Smoking Cessation:   Therapy: ? Pain relief at night?-sleep hygiene      Return to clinic: 2 weeks     Length of Service (minutes direct face-to-face contact):45    Diagnosis:     ICD-10-CM ICD-9-CM   1. Adjustment disorder with mixed anxiety and depressed mood  F43.23 309.28           Xavier Núñez, PhD  [place] License #[id number]  [place] License #[id number]

## 2025-04-02 ENCOUNTER — OFFICE VISIT (OUTPATIENT)
Dept: PSYCHIATRY | Facility: CLINIC | Age: 70
End: 2025-04-02
Payer: MEDICARE

## 2025-04-02 DIAGNOSIS — F43.23 ADJUSTMENT DISORDER WITH MIXED ANXIETY AND DEPRESSED MOOD: Primary | ICD-10-CM

## 2025-04-02 PROCEDURE — 99999 PR PBB SHADOW E&M-EST. PATIENT-LVL I: CPT | Mod: PBBFAC,,, | Performed by: PSYCHOLOGIST

## 2025-04-02 PROCEDURE — 90834 PSYTX W PT 45 MINUTES: CPT | Mod: ,,, | Performed by: PSYCHOLOGIST

## 2025-04-02 PROCEDURE — 99211 OFF/OP EST MAY X REQ PHY/QHP: CPT | Mod: PBBFAC | Performed by: PSYCHOLOGIST

## 2025-04-02 NOTE — PROGRESS NOTES
PSYCHO-ONCOLOGY NOTE/ Individual Psychotherapy     Date: 4/2/2025   Site of therapist:  [facility]          Therapeutic Intervention: Met with patient.  Outpatient - Behavior modifying psychotherapy 45 min - CPT code 67124  The patient's last visit with me was on 3/19/2025.    Problem list  Patient Active Problem List   Diagnosis    Cough    Cognitive complaints    Adjustment disorder with mixed anxiety and depressed mood    Cervical vertigo    Dyslipidemia    Essential hypertension    Hormone replacement therapy    Intertrigo    Neck pain    Vitamin D deficiency    Vulvitis    Caregiver stress    Right knee pain       Chief complaint/reason for encounter: anxiety, depression  Met with patient to evaluate psychosocial adaptation to decreased mobility, caregiving    Current Medications  Current Outpatient Medications   Medication    acetaminophen (TYLENOL) 500 MG tablet    ammonium lactate 12 % Crea    econazole nitrate 1 % cream    estradiol (ESTRACE) 1 MG tablet    loratadine (CLARITIN) 10 mg tablet    methocarbamoL (ROBAXIN) 750 MG Tab    nystatin (MYCOSTATIN) cream    omeprazole (PRILOSEC) 10 MG capsule    triamterene-hydrochlorothiazide 37.5-25 mg (DYAZIDE) 37.5-25 mg per capsule     No current facility-administered medications for this visit.       Objective:  Verena Toscano arrived promptly for the session.  Ms. Toscano was independently ambulatory at the time of session. The patient was fully cooperative throughout the session.  Appearance: age appropriate, casually  dressed, well groomed  Behavior/Cooperation: friendly and cooperative  Speech: normal in rate, volume, and tone and appropriate quality, quantity and organization of sentences  Mood: euthymic  Affect: appropriate to content  Thought Process: goal-directed, logical  Thought Content: normal,  No delusions or paranoia; did not appear to be responding to internal stimuli during the session  Orientation: grossly intact  Memory: Grossly  intact  Attention Span/Concentration: Attends to session without distraction; reports no difficulty  Fund of Knowledge: average  Estimate of Intelligence: average from verbal skills and history  Cognition: grossly intact  Insight: patient has awareness of illness; good insight into own behavior and behavior of others  Judgment: the patient's behavior is adequate to circumstances    Interval history and content of current session: Patient discussed events and activities since the time of last visit. She discussed her own health challenges and her emotional  reaction to her physical limitations. Discussed her decision to proceed with knee replacement at [facility]. Some worry about post-surgical caregiving and pain management (due to her allergies).  Daughter is coming in for the first weekend. Son is around to assist.   Discussed importance ot taking adequate time off of work to heal. (2 weeks home PT, 4+ weeks outside PT, 6 weeks to drive again).    Sleep is improved with better sleep hygiene.    Prior: discussion of sexual changes: Enjoyable and fulfilling sex life prior to 's illness. Historical use of Viagra, Cialis. 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 Since Padcev, his ability to have an erection (or ejaculate without an erection) is almost gone. She feels his interest is low (although they still cuddle and show other physical affection). She wonders about his thinking about their change in interaction and whether he is interested in other potential assistive devices for intercourse. (Her drive has not changed, is comfortable masturbating, unsure how he thinks about her sexual interest).     Risk parameters:   Patient reports no suicidal ideation  Patient reports no homicidal ideation  Patient reports no self-injurious behavior  Patient reports no violent behavior   Safety needs:  None at this time      Verbal deficits:  None     Patient's response to intervention:The patient's response to intervention is accepting, motivated.     Progress toward goals: Progress as Expected        Patient reported outcomes:      Distress thermometer:       4/1/2025     8:06 PM 3/19/2025    12:57 PM 3/4/2025     3:10 PM 2/20/2025     5:23 PM 2/5/2025     8:02 PM 1/22/2025     2:43 PM 1/2/2025    10:59 AM   DISTRESS SCREENING   Distress Score 3 4 3 7 1 4 7   Practical Concerns Taking care of myself;Taking care of others;Finances Taking care of myself;Taking care of others;Work;Finances;Treatment decisions;Safety Taking care of others;Finances;Safety Taking care of myself;Taking care of others;Work;Safety;Finances  Taking care of myself;Work;Finances  Taking care of myself;Taking care of others;Work;Finances;Access to medicine;Treatment decisions;None of these    Social Concerns None of these None of these Relationship with children None of these  Relationship with family members Communication with health care team   Emotional Concerns Worry or anxiety;Changes in appearance Worry or anxiety;Sadness or depression;Anger;Feelings of worthlessness or being a burden Fear;Changes in appearance;Feelings of worthlessness or being a burden Worry or anxiety;Sadness or depression;Grief or loss;Fear;Feelings of worthlessness or being a burden  Worry or anxiety;Grief or loss;Feelings of worthlessness or being a burden Worry or anxiety;Fear;Changes in appearance   Spiritual or Episcopal Concerns None of these None of these None of these None of these  None of these None of these   Physical Concerns Pain;Fatigue;Loss or change of physical abilities Pain;Sleep;Fatigue;None of these Pain;Fatigue;Loss or change of physical abilities Pain;Fatigue;Changes in eating;Loss or change of physical abilities  Pain;Fatigue;Changes in eating Pain;Fatigue;Loss or change of physical abilities       Data saved with a previous flowsheet row definition           PHQ-9=4   ; Initial  visit: 7       DEANNA-7=4; Initial visit:6       4/1/2025     8:10 PM 3/4/2025     4:27 PM 1/22/2025     2:49 PM   GAD7   1. Feeling nervous, anxious, or on edge? 1 1 1   2. Not being able to stop or control worrying? 1 0 1   3. Worrying too much about different things? 0 1 1   4. Trouble relaxing? 0 0 0   5. Being so restless that it is hard to sit still? 0 0 0   6. Becoming easily annoyed or irritable? 1 1 1   7. Feeling afraid as if something awful might happen? 1 1 1   8. If you checked off any problems, how difficult have these problems made it for you to do your work, take care of things at home, or get along with other people? 1 1 1   DEANNA-7 Score 4 4  5       Patient-reported          Client Strengths: verbal, intelligent, successful, good social support, good insight, commitment to wellness      Treatment Plan:individual psychotherapy  Target symptoms: adjustment  Why chosen therapy is appropriate versus another modality: relevant to diagnosis, patient responds to this modality, evidence based practice  Outcome monitoring methods: self-report, checklist/rating scale  Therapeutic intervention type: behavior modifying psychotherapy  Prognosis: Good    Goals from last visit: Met   Behavioral goals prior to next visit:    Exercise: pre-hab for knee, daily gym   Stress management: plan time off   Social engagement: don't take Nakul's behavior personally   Nutrition:    Smoking Cessation:   Therapy:       Return to clinic: 1 month     Length of Service (minutes direct face-to-face contact):45    Diagnosis:   No diagnosis found.          Xavier Núñez, PhD  [place] License #[id number]  [place] License #[id number]

## 2025-04-10 ENCOUNTER — PATIENT MESSAGE (OUTPATIENT)
Dept: SPORTS MEDICINE | Facility: CLINIC | Age: 70
End: 2025-04-10
Payer: MEDICARE

## 2025-04-11 ENCOUNTER — TELEPHONE (OUTPATIENT)
Dept: CARDIOLOGY | Facility: CLINIC | Age: 70
End: 2025-04-11
Payer: MEDICARE

## 2025-04-17 ENCOUNTER — TELEPHONE (OUTPATIENT)
Dept: CARDIOLOGY | Facility: CLINIC | Age: 70
End: 2025-04-17
Payer: MEDICARE

## 2025-04-21 ENCOUNTER — PATIENT MESSAGE (OUTPATIENT)
Dept: CARDIOLOGY | Facility: CLINIC | Age: 70
End: 2025-04-21
Payer: MEDICARE

## 2025-04-30 ENCOUNTER — OFFICE VISIT (OUTPATIENT)
Dept: PSYCHIATRY | Facility: CLINIC | Age: 70
End: 2025-04-30
Payer: MEDICARE

## 2025-04-30 DIAGNOSIS — F43.23 ADJUSTMENT DISORDER WITH MIXED ANXIETY AND DEPRESSED MOOD: Primary | ICD-10-CM

## 2025-04-30 NOTE — PROGRESS NOTES
Telemedicine PSYCHO-ONCOLOGY NOTE/ Individual Psychotherapy     Consultation started: 4/30/2025 at 10:31 AM   The patient location is: Patient home in [place], [place] (Provider licensed in [place], [place], [place])  Virtual visit with synchronous audio and video  Patient alone at the time of consultation    Crisis Disclaimer: Patient was informed that due to the virtual nature of the visit, that if a crisis develops, protocols will be implemented to ensure patient safety, including but not limited to: 1) Initiating a welfare check with local Law Enforcement, 2) Calling 1/National Crisis Hotline, and/or 3) Initiating PEC/CEC procedures.       Each patient provided medical services by telemedicine is:  (1) informed of the relationship between the physician and patient and the respective role of any other health care provider with respect to management of the patient; and (2) notified that he or she may decline to receive medical services by telemedicine and may withdraw from such care at any time.    Date: 4/30/2025   [facility]              Therapeutic Intervention: Met with patient.  Outpatient - Behavior modifying psychotherapy 60 min - CPT code 50801  The patient's last visit with me was on 4/2/2025.    Problem list  Patient Active Problem List   Diagnosis    Cough    Cognitive complaints    Adjustment disorder with mixed anxiety and depressed mood    Cervical vertigo    Dyslipidemia    Essential hypertension    Hormone replacement therapy    Intertrigo    Neck pain    Vitamin D deficiency    Vulvitis    Caregiver stress    Right knee pain       Chief complaint/reason for encounter: anxiety, depression  Met with patient to evaluate psychosocial adaptation to decreased mobility, caregiving    Current Medications  Current Outpatient Medications   Medication    acetaminophen (TYLENOL) 500 MG tablet    ammonium lactate 12 % Crea    econazole nitrate 1 % cream    estradiol (ESTRACE) 1 MG tablet     loratadine (CLARITIN) 10 mg tablet    methocarbamoL (ROBAXIN) 750 MG Tab    nystatin (MYCOSTATIN) cream    omeprazole (PRILOSEC) 10 MG capsule    triamterene-hydrochlorothiazide 37.5-25 mg (DYAZIDE) 37.5-25 mg per capsule     No current facility-administered medications for this visit.       Objective:  Verena Toscano arrived promptly for the session.  Ms. Toscano was independently ambulatory at the time of session. The patient was fully cooperative throughout the session.  Appearance: age appropriate, casually  dressed, well groomed  Behavior/Cooperation: friendly and cooperative  Speech: normal in rate, volume, and tone and appropriate quality, quantity and organization of sentences  Mood: dysthymic  Affect: tearful  Thought Process: goal-directed, logical  Thought Content: normal,  No delusions or paranoia; did not appear to be responding to internal stimuli during the session  Orientation: grossly intact  Memory: Grossly intact  Attention Span/Concentration: Attends to session without distraction; reports no difficulty  Fund of Knowledge: average  Estimate of Intelligence: average from verbal skills and history  Cognition: grossly intact  Insight: patient has awareness of illness; good insight into own behavior and behavior of others  Judgment: the patient's behavior is adequate to circumstances    Interval history and content of current session: Patient discussed events and activities since the time of last visit. Her knee replacement went well (4/24/25). She discussed her emotional  reaction to her surgery/recovery. Discussed challenges in post-surgical caregiving and pain management.  Daughter came for the first weekend. Son is around to assist.  is primary, but is reluctant to help in the way she needs and expresses resentfulness over her requests for specific items/needs.     She has taken adequate time off of work to heal. (2 weeks home PT, 4+ weeks outside PT, 6 weeks to drive again).   Patient discussed her loneliness and wishing she had her close supports local to her.    Prior: discussion of sexual changes: Enjoyable and fulfilling sex life prior to 's illness. Historical use of Viagra, Cialis. 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 Since Padcev, his ability to have an erection (or ejaculate without an erection) is almost gone. She feels his interest is low (although they still cuddle and show other physical affection). She wonders about his thinking about their change in interaction and whether he is interested in other potential assistive devices for intercourse. (Her drive has not changed, is comfortable masturbating, unsure how he thinks about her sexual interest).     Risk parameters:   Patient reports no suicidal ideation  Patient reports no homicidal ideation  Patient reports no self-injurious behavior  Patient reports no violent behavior   Safety needs:  None at this time      Verbal deficits: None     Patient's response to intervention:The patient's response to intervention is accepting, motivated.     Progress toward goals: Progress as Expected        Patient reported outcomes:      Distress thermometer:       4/29/2025     3:37 PM 4/1/2025     8:06 PM 3/19/2025    12:57 PM 3/4/2025     3:10 PM 2/20/2025     5:23 PM 2/5/2025     8:02 PM 1/22/2025     2:43 PM   DISTRESS SCREENING   Distress Score 10 - Extreme Distress 3 4 3 7 1 4   Practical Concerns Taking care of myself;Taking care of others;Work;Housing/Utilities;Finances;Treatment decisions Taking care of myself;Taking care of others;Finances Taking care of myself;Taking care of others;Work;Finances;Treatment decisions;Safety Taking care of others;Finances;Safety Taking care of myself;Taking care of others;Work;Safety;Finances  Taking care of myself;Work;Finances    Social Concerns Relationship with spouse or partner;Relationship with  children None of these None of these Relationship with children None of these  Relationship with family members   Emotional Concerns Worry or anxiety;Fear;Loneliness;Anger;Feelings of worthlessness or being a burden Worry or anxiety;Changes in appearance Worry or anxiety;Sadness or depression;Anger;Feelings of worthlessness or being a burden Fear;Changes in appearance;Feelings of worthlessness or being a burden Worry or anxiety;Sadness or depression;Grief or loss;Fear;Feelings of worthlessness or being a burden  Worry or anxiety;Grief or loss;Feelings of worthlessness or being a burden   Spiritual or Presybeterian Concerns None of these None of these None of these None of these None of these  None of these   Physical Concerns Pain;Fatigue;Changes in eating;Loss or change of physical abilities Pain;Fatigue;Loss or change of physical abilities Pain;Sleep;Fatigue;None of these Pain;Fatigue;Loss or change of physical abilities Pain;Fatigue;Changes in eating;Loss or change of physical abilities  Pain;Fatigue;Changes in eating       Data saved with a previous flowsheet row definition           PHQ-9=PHQ ANSWERS    Q1. Little interest or pleasure in doing things: More than half the days (04/29/25 1546)  Q2. Feeling down, depressed, or hopeless: More than half the days (04/29/25 1546)  Q3. Trouble falling or staying asleep, or sleeping too much: Not at all (04/29/25 1546)  Q4. Feeling tired or having little energy: More than half the days (04/29/25 1546)  Q5. Poor appetite or overeating: Several days (04/29/25 1546)  Q6. Feeling bad about yourself - or that you are a failure or have let yourself or your family down: More than half the days (04/29/25 1546)  Q7. Trouble concentrating on things, such as reading the newspaper or watching television: Several days (04/29/25 1546)  Q8. Moving or speaking so slowly that other people could have noticed. Or the opposite - being so fidgety or restless that you have been moving around a  "lot more than usual: Several days (04/29/25 1546)  Q9.      PHQ8 Score : 11 (04/29/25 1546)  PHQ-9 Total Score: 11 (04/29/25 1546) ; Initial visit: 7       DEANNA-7= Initial visit:6       4/29/2025     3:43 PM 4/1/2025     8:10 PM 3/4/2025     4:27 PM   GAD7   1. Feeling nervous, anxious, or on edge? 3 1 1   2. Not being able to stop or control worrying? 2 1 0   3. Worrying too much about different things? 3 0 1   4. Trouble relaxing? 1 0 0   5. Being so restless that it is hard to sit still? 2 0 0   6. Becoming easily annoyed or irritable? 3 1 1   7. Feeling afraid as if something awful might happen? 2 1 1   8. If you checked off any problems, how difficult have these problems made it for you to do your work, take care of things at home, or get along with other people? 1 1 1   DEANNA-7 Score 16 4 4        Patient-reported          Client Strengths: verbal, intelligent, successful, good social support, good insight, commitment to wellness      Treatment Plan:individual psychotherapy  Target symptoms: adjustment  Why chosen therapy is appropriate versus another modality: relevant to diagnosis, patient responds to this modality, evidence based practice  Outcome monitoring methods: self-report, checklist/rating scale  Therapeutic intervention type: behavior modifying psychotherapy  Prognosis: Good    Goals from last visit: Met   Behavioral goals prior to next visit:    Exercise:    Stress management: increase prayer focus   Social engagement: ask Nakul more specifically from the beginning   Nutrition:    Smoking Cessation:   Therapy:  challenge self-talk around "weakness"      Return to clinic: 3 weeks     Length of Service (minutes direct face-to-face contact): 60    Diagnosis:     ICD-10-CM ICD-9-CM   1. Adjustment disorder with mixed anxiety and depressed mood  F43.23 309.28             Xavier Núñez, PhD  [place] License #[id number]  [place] License #[id number]              "

## 2025-05-08 ENCOUNTER — PATIENT MESSAGE (OUTPATIENT)
Dept: REHABILITATION | Facility: HOSPITAL | Age: 70
End: 2025-05-08
Payer: MEDICARE

## 2025-05-09 DIAGNOSIS — M17.11 UNILATERAL PRIMARY OSTEOARTHRITIS, RIGHT KNEE: Primary | ICD-10-CM

## 2025-05-14 ENCOUNTER — CLINICAL SUPPORT (OUTPATIENT)
Dept: REHABILITATION | Facility: HOSPITAL | Age: 70
End: 2025-05-14
Payer: MEDICARE

## 2025-05-14 ENCOUNTER — PATIENT MESSAGE (OUTPATIENT)
Dept: REHABILITATION | Facility: HOSPITAL | Age: 70
End: 2025-05-14

## 2025-05-14 DIAGNOSIS — Z96.651 STATUS POST TOTAL RIGHT KNEE REPLACEMENT: Primary | ICD-10-CM

## 2025-05-14 PROBLEM — Z96.659 S/P TOTAL KNEE ARTHROPLASTY: Status: ACTIVE | Noted: 2025-05-14

## 2025-05-14 PROCEDURE — 97161 PT EVAL LOW COMPLEX 20 MIN: CPT

## 2025-05-14 PROCEDURE — 97112 NEUROMUSCULAR REEDUCATION: CPT

## 2025-05-14 NOTE — PROGRESS NOTES
Outpatient Rehab    Physical Therapy Evaluation    Patient Name: Verena Toscano  MRN: [id number]  YOB: 1955  Encounter Date: 5/14/2025    Therapy Diagnosis:   Encounter Diagnosis   Name Primary?    Status post total right knee replacement Yes     Physician: Navi Fernandez III, *    Physician Orders: Eval and Treat  Medical Diagnosis:     Visit # / Visits Authorized:    Insurance Authorization Period:   Date of Evaluation: 5/14/2025  Plan of Care Certification: 5/14/2025 to 7/2/2025     Time In: 0906   Time Out: 1000  Total Time (in minutes): 54   Total Billable Time (in minutes):  54 minutes    Intake Outcome Measure for FOTO Survey    Therapist reviewed FOTO scores for Verena Toscano on 5/14/2025.   FOTO report - see Media section or FOTO account episode details.     Intake Score:  See media section.     Precautions:       Subjective   History of Present Illness  Verena is a 70 y.o. female who reports to physical therapy with a chief concern of R knee pain s/p TKA.     The patient reports a medical diagnosis of M17.11 (ICD-10-CM) - Unilateral primary osteoarthritis, right knee.            History of Present Condition/Illness: David presents to physical therapy ambulating with rolling walker. She is 2 weeks, 6 days status post right total knee arthroplasty. She had her surgery done a [facility] on the [facility]. She received home health for two weeks following surgery. She reports she has greatest amounts of pain before going to bed. She has been compliant with home exercises from home health PT.     Activities of Daily Living  Social history was obtained from Patient.               Previously independent with activities of daily living? Yes     Currently independent with activities of daily living? No          Previously independent with instrumental activities of daily living? Yes     Currently independent with instrumental activities of daily living? No              Pain     Patient  reports a current pain level of 4/10. Pain at best is reported as 2/10. Pain at worst is reported as 8/10.   Location: right knee  Clinical Progression (since onset): Stable  Pain Qualities: Aching, Discomfort  Pain-Relieving Factors: Activity modification, Ice, Rest, Medications - prescription, Physical therapy  Pain-Aggravating Factors: Stretching, Standing, Straightening         Review of Systems  Patient reports: Osteoarthritis        Treatment History  Treatments  Discharged From Past 30 Days: Home health care  Previously Received Treatments: Yes  Previous Treatments: Physical therapy  Currently Receiving Treatments: No    Living Arrangements  Living Situation  Housing: Home independently  Living Arrangements: Spouse/significant other        Employment  Patient reports: Does the patient's condition impact their ability to work?  Employment Status: Employed full-time   Behavioral health therapist      Past Medical History/Physical Systems Review:   Verena Toscano  has a past medical history of GERD (gastroesophageal reflux disease), Hypertension, and Therapy.    Verena Toscano  has a past surgical history that includes Tonsillectomy; Laminectomy;  section; Hysterectomy; breast reduction; Appendectomy; and Adenoidectomy.    Verena has a current medication list which includes the following prescription(s): acetaminophen, ammonium lactate, econazole nitrate, estradiol, loratadine, methocarbamol, nystatin, omeprazole, and triamterene-hydrochlorothiazide 37.5-25 mg.    Review of patient's allergies indicates:   Allergen Reactions    Synvisc [hylan g-f 20] Other (See Comments)     Personal history of synviscitis following intra-articular Synvisc injection    Bactrim [sulfamethoxazole-trimethoprim] Other (See Comments)     Severe headaches. crystaline baldder    Demerol [meperidine] Nausea And Vomiting    Sulfa (sulfonamide antibiotics) Hives    Codeine Nausea And Vomiting    Morphine Nausea And  Vomiting and Rash        Objective   Knee Observations  Right Knee Observations  Present: Edema and Incision  Right Knee Incision Observations  Present: Clean and Dry  Not Present: Drainage  Left Knee Observations  Not Present: Edema             Knee Range of Motion   Right Knee   Active (deg) Passive (deg) Pain   Flexion 98       Extension 1 (lacking)                       Ambulation Assistance Required  Surface With  Assistive Device Without Assistive Device Details   Level Independent        Uneven         Curb           Gait Analysis  Base of Support: Normal  Walking Speed: Decreased    Right Side Walking Observations  Decreased: Stance Time       Left Side Walking Observations  Increased: Stance Time            Treatment:       Time Entry(in minutes):  PT Evaluation (Low) Time Entry: 16  Neuromuscular Re-Education Time Entry: 38    Assessment & Plan   Assessment  Verena presents with a condition of Low complexity.   Presentation of Symptoms: Stable  Will Comorbidities Impact Care: No       Functional Limitations: Activity tolerance, Completing self-care activities, Proprioception, Range of motion, Participating in leisure activities, Pain with ADLs/IADLs, Gross motor coordination  Impairments: Pain with functional activity, Impaired physical strength  Personal Factors Affecting Prognosis: Pain    Patient Goal for Therapy (PT): to return to prior level of function without pain  Prognosis: Good  Assessment Details: Verena is a 70-year-old female 2 weeks, 6 days status post right total knee arthroplasty. Patient demonstrates deficits with range of motion, strength, and function that limit ability to participate in work and recreational activities. She is able to achieve 1-98 degrees AROM. Moderate cues for quad activation in quad series exercises. She would benefit from skilled PT services to normalize kinetic chain mobility, strength, and function to safely return to their prior level of activity.      Plan  From a physical therapy perspective, the patient would benefit from: Skilled Rehab Services    Planned therapy interventions include: Therapeutic exercise, Therapeutic activities, Neuromuscular re-education, Manual therapy, ADLs/IADLs, Other (Comment), and Gait training. Dry Needling (prn)  Planned modalities to include: Biofeedback, Electrical stimulation - attended, Electrical stimulation - passive/unattended, Thermotherapy (hot pack), and Cryotherapy (cold pack).        Visit Frequency: 3 times Per Week for 6 Weeks.       This plan was discussed with Patient.   Discussion participants: Agreed Upon Plan of Care  Plan details: Frequency and duration of treatment to be adjusted as needed          Patient's spiritual, cultural, and educational needs considered and patient agreeable to plan of care and goals.           Goals:   Active       Ambulation/movement       Patient will walk for 20 minutes with least restrictive assistive device and report </= 3/10 pain (Progressing)       Start:  05/14/25    Expected End:  07/02/25               Changing body position       Patient will demonstrate moving sit to stand 10x in 30 seconds to demonstrate functional mobility (Progressing)       Start:  05/14/25    Expected End:  07/02/25               Functional outcome       Patient will show a significant change in FOTO patient-reported outcome tool to demonstrate subjective improvement (Progressing)       Start:  05/14/25    Expected End:  07/02/25            Patient stated goal: to return to prior level of function without pain  (Progressing)       Start:  05/14/25    Expected End:  07/02/25            Patient will demonstrate independence in home program for support of progression (Progressing)       Start:  05/14/25    Expected End:  07/02/25               Pain       Patient will report a 2 point reduction in pain while performing physical therapy exercises (Progressing)       Start:  05/14/25    Expected End:   07/02/25               Range of Motion       Patient will achieve right knee ROM of  0-120 degrees  (Progressing)       Start:  05/14/25    Expected End:  07/02/25                Lois Hebert, PT, DPT

## 2025-05-14 NOTE — PROGRESS NOTES
Outpatient Rehab    Physical Therapy Evaluation    Patient Name: Verena Toscano  MRN: [id number]  YOB: 1955  Encounter Date: 5/14/2025    Therapy Diagnosis:   Encounter Diagnosis   Name Primary?    Status post total right knee replacement Yes     Physician: Navi Fernandez III, *    Physician Orders: Eval and Treat  Medical Diagnosis:     Visit # / Visits Authorized:    Insurance Authorization Period:   Date of Evaluation: 5/14/2025  Plan of Care Certification: 5/14/2025 to 7/2/2025     Time In: 0906   Time Out: 1000  Total Time (in minutes): 54   Total Billable Time (in minutes):  54 minutes    Intake Outcome Measure for FOTO Survey    Therapist reviewed FOTO scores for Verena Toscano on 5/14/2025.   FOTO report - see Media section or FOTO account episode details.     Intake Score:  See media section.     Precautions:       Subjective   History of Present Illness  Verena is a 70 y.o. female who reports to physical therapy with a chief concern of R knee pain s/p TKA.     The patient reports a medical diagnosis of M17.11 (ICD-10-CM) - Unilateral primary osteoarthritis, right knee.            History of Present Condition/Illness: David presents to physical therapy ambulating with rolling walker. She is 2 weeks, 6 days status post right total knee arthroplasty. She had her surgery done a [facility] on the [facility]. She received home health for two weeks following surgery. She reports she has greatest amounts of pain before going to bed. She has been compliant with home exercises from home health PT.     Activities of Daily Living  Social history was obtained from Patient.               Previously independent with activities of daily living? Yes     Currently independent with activities of daily living? No          Previously independent with instrumental activities of daily living? Yes     Currently independent with instrumental activities of daily living? No              Pain     Patient  reports a current pain level of 4/10. Pain at best is reported as 2/10. Pain at worst is reported as 8/10.   Location: right knee  Clinical Progression (since onset): Stable  Pain Qualities: Aching, Discomfort  Pain-Relieving Factors: Activity modification, Ice, Rest, Medications - prescription, Physical therapy  Pain-Aggravating Factors: Stretching, Standing, Straightening         Review of Systems  Patient reports: Osteoarthritis        Treatment History  Treatments  Discharged From Past 30 Days: Home health care  Previously Received Treatments: Yes  Previous Treatments: Physical therapy  Currently Receiving Treatments: No    Living Arrangements  Living Situation  Housing: Home independently  Living Arrangements: Spouse/significant other        Employment  Patient reports: Does the patient's condition impact their ability to work?  Employment Status: Employed full-time   Behavioral health therapist      Past Medical History/Physical Systems Review:   Verena Toscano  has a past medical history of GERD (gastroesophageal reflux disease), Hypertension, and Therapy.    Verena Toscano  has a past surgical history that includes Tonsillectomy; Laminectomy;  section; Hysterectomy; breast reduction; Appendectomy; and Adenoidectomy.    Verena has a current medication list which includes the following prescription(s): acetaminophen, ammonium lactate, econazole nitrate, estradiol, loratadine, methocarbamol, nystatin, omeprazole, and triamterene-hydrochlorothiazide 37.5-25 mg.    Review of patient's allergies indicates:   Allergen Reactions    Synvisc [hylan g-f 20] Other (See Comments)     Personal history of synviscitis following intra-articular Synvisc injection    Bactrim [sulfamethoxazole-trimethoprim] Other (See Comments)     Severe headaches. crystaline baldder    Demerol [meperidine] Nausea And Vomiting    Sulfa (sulfonamide antibiotics) Hives    Codeine Nausea And Vomiting    Morphine Nausea And  Vomiting and Rash        Objective   Knee Observations  Right Knee Observations  Present: Edema and Incision  Right Knee Incision Observations  Present: Clean and Dry  Not Present: Drainage  Left Knee Observations  Not Present: Edema             Knee Range of Motion   Right Knee   Active (deg) Passive (deg) Pain   Flexion 98       Extension 1 (lacking)                       Ambulation Assistance Required  Surface With  Assistive Device Without Assistive Device Details   Level Independent        Uneven         Curb           Gait Analysis  Base of Support: Normal  Walking Speed: Decreased    Right Side Walking Observations  Decreased: Stance Time       Left Side Walking Observations  Increased: Stance Time            Treatment:  Balance/Neuromuscular Re-Education  NMR 1: Patient education: HEP compliance, swelling management, ROM expectations post TKA, importance of extension ROM for proper gait cycle  NMR 2: Russian NMES 10 sec on/30 sec off including: quad sets with towel roll x 10 minutes, SAQs with medium bolster x 10 minutes, SAQs with 1/2 foam x 0 minutes, LAQs at EOM x 0 minutes  NMR 5: Nustep for gait mechanics; 10 minutes, Lvl 1    Time Entry(in minutes):  PT Evaluation (Low) Time Entry: 16  Neuromuscular Re-Education Time Entry: 38    Assessment & Plan   Assessment  Verena presents with a condition of Low complexity.   Presentation of Symptoms: Stable  Will Comorbidities Impact Care: No       Functional Limitations: Activity tolerance, Completing self-care activities, Proprioception, Range of motion, Participating in leisure activities, Pain with ADLs/IADLs, Gross motor coordination  Impairments: Pain with functional activity, Impaired physical strength  Personal Factors Affecting Prognosis: Pain    Patient Goal for Therapy (PT): to return to prior level of function without pain  Prognosis: Good  Assessment Details: Verena is a 70-year-old female 2 weeks, 6 days status post right total knee  arthroplasty. Patient demonstrates deficits with range of motion, strength, and function that limit ability to participate in work and recreational activities. She is able to achieve 1-98 degrees AROM. Moderate cues for quad activation in quad series exercises. She would benefit from skilled PT services to normalize kinetic chain mobility, strength, and function to safely return to their prior level of activity.     Plan  From a physical therapy perspective, the patient would benefit from: Skilled Rehab Services    Planned therapy interventions include: Therapeutic exercise, Therapeutic activities, Neuromuscular re-education, Manual therapy, ADLs/IADLs, Other (Comment), and Gait training. Dry Needling (prn)  Planned modalities to include: Biofeedback, Electrical stimulation - attended, Electrical stimulation - passive/unattended, Thermotherapy (hot pack), and Cryotherapy (cold pack).        Visit Frequency: 3 times Per Week for 6 Weeks.       This plan was discussed with Patient.   Discussion participants: Agreed Upon Plan of Care  Plan details: Frequency and duration of treatment to be adjusted as needed          Patient's spiritual, cultural, and educational needs considered and patient agreeable to plan of care and goals.           Goals:   Active       Ambulation/movement       Patient will walk for 20 minutes with least restrictive assistive device and report </= 3/10 pain (Progressing)       Start:  05/14/25    Expected End:  07/02/25               Changing body position       Patient will demonstrate moving sit to stand 10x in 30 seconds to demonstrate functional mobility (Progressing)       Start:  05/14/25    Expected End:  07/02/25               Functional outcome       Patient will show a significant change in FOTO patient-reported outcome tool to demonstrate subjective improvement (Progressing)       Start:  05/14/25    Expected End:  07/02/25            Patient stated goal: to return to prior level of  function without pain  (Progressing)       Start:  05/14/25    Expected End:  07/02/25            Patient will demonstrate independence in home program for support of progression (Progressing)       Start:  05/14/25    Expected End:  07/02/25               Pain       Patient will report a 2 point reduction in pain while performing physical therapy exercises (Progressing)       Start:  05/14/25    Expected End:  07/02/25               Range of Motion       Patient will achieve right knee ROM of  0-120 degrees  (Progressing)       Start:  05/14/25    Expected End:  07/02/25                Lois Hebert, PT, DPT

## 2025-05-16 ENCOUNTER — CLINICAL SUPPORT (OUTPATIENT)
Dept: REHABILITATION | Facility: HOSPITAL | Age: 70
End: 2025-05-16
Payer: MEDICARE

## 2025-05-16 ENCOUNTER — OFFICE VISIT (OUTPATIENT)
Dept: PSYCHIATRY | Facility: CLINIC | Age: 70
End: 2025-05-16
Payer: MEDICARE

## 2025-05-16 DIAGNOSIS — F43.23 ADJUSTMENT DISORDER WITH MIXED ANXIETY AND DEPRESSED MOOD: Primary | ICD-10-CM

## 2025-05-16 DIAGNOSIS — Z96.651 STATUS POST TOTAL RIGHT KNEE REPLACEMENT: Primary | ICD-10-CM

## 2025-05-16 PROCEDURE — 97014 ELECTRIC STIMULATION THERAPY: CPT

## 2025-05-16 PROCEDURE — 97112 NEUROMUSCULAR REEDUCATION: CPT

## 2025-05-16 NOTE — PROGRESS NOTES
Telemedicine PSYCHO-ONCOLOGY NOTE/ Individual Psychotherapy     Consultation started: 5/16/2025 at  8:15 AM   The patient location is: Patient home in [place], [place] (Provider licensed in [place], [place], [place])  Virtual visit with synchronous audio and video  Patient alone at the time of consultation    Crisis Disclaimer: Patient was informed that due to the virtual nature of the visit, that if a crisis develops, protocols will be implemented to ensure patient safety, including but not limited to: 1) Initiating a welfare check with local Law Enforcement, 2) Calling 1/National Crisis Hotline, and/or 3) Initiating PEC/CEC procedures.       Each patient provided medical services by telemedicine is:  (1) informed of the relationship between the physician and patient and the respective role of any other health care provider with respect to management of the patient; and (2) notified that he or she may decline to receive medical services by telemedicine and may withdraw from such care at any time.    Date: 5/16/2025   Site of therapist:  [facility]              Therapeutic Intervention: Met with patient.  Outpatient - Behavior modifying psychotherapy 45 min - CPT code 43650  The patient's last visit with me was on 4/30/2025.    Problem list  Patient Active Problem List   Diagnosis    Cough    Cognitive complaints    Adjustment disorder with mixed anxiety and depressed mood    Cervical vertigo    Dyslipidemia    Essential hypertension    Hormone replacement therapy    Intertrigo    Neck pain    Vitamin D deficiency    Vulvitis    Caregiver stress    Right knee pain    S/P total knee arthroplasty       Chief complaint/reason for encounter: anxiety, depression  Met with patient to evaluate psychosocial adaptation to decreased mobility, caregiving    Current Medications  Current Outpatient Medications   Medication    acetaminophen (TYLENOL) 500 MG tablet    ammonium lactate 12 % Crea    econazole nitrate 1 % cream    estradiol  (ESTRACE) 1 MG tablet    loratadine (CLARITIN) 10 mg tablet    methocarbamoL (ROBAXIN) 750 MG Tab    nystatin (MYCOSTATIN) cream    omeprazole (PRILOSEC) 10 MG capsule    triamterene-hydrochlorothiazide 37.5-25 mg (DYAZIDE) 37.5-25 mg per capsule     No current facility-administered medications for this visit.       Objective:  Verena Toscano arrived promptly for the session.  Ms. Toscano was independently ambulatory at the time of session. The patient was fully cooperative throughout the session.  Appearance: age appropriate, casually  dressed, well groomed  Behavior/Cooperation: friendly and cooperative  Speech: normal in rate, volume, and tone and appropriate quality, quantity and organization of sentences  Mood: dysthymic  Affect: tearful  Thought Process: goal-directed, logical  Thought Content: normal,  No delusions or paranoia; did not appear to be responding to internal stimuli during the session  Orientation: grossly intact  Memory: Grossly intact  Attention Span/Concentration: Attends to session without distraction; reports no difficulty  Fund of Knowledge: average  Estimate of Intelligence: average from verbal skills and history  Cognition: grossly intact  Insight: patient has awareness of illness; good insight into own behavior and behavior of others  Judgment: the patient's behavior is adequate to circumstances    Interval history and content of current session: Patient discussed events and activities since the time of last visit. Her knee replacement recovery is going well. Outpatient PT is challenging, but manageable. She discussed her ongoing emotional  reaction to her surgery/recovery. Discussed challenges in post-surgical caregiving and pain management. She feels she has learned a lot about marital communication and empathy in this process. She is also learning about therapeutic boundaries.    Patient and her  are exploring options of moving to a planned senior community or  manny.    Prior: discussion of sexual changes: Enjoyable and fulfilling sex life prior to 's illness. Historical use of Viagra, Cialis. 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 Since Padcev, his ability to have an erection (or ejaculate without an erection) is almost gone. She feels his interest is low (although they still cuddle and show other physical affection). She wonders about his thinking about their change in interaction and whether he is interested in other potential assistive devices for intercourse. (Her drive has not changed, is comfortable masturbating, unsure how he thinks about her sexual interest).     Risk parameters:   Patient reports no suicidal ideation  Patient reports no homicidal ideation  Patient reports no self-injurious behavior  Patient reports no violent behavior   Safety needs:  None at this time      Verbal deficits: None     Patient's response to intervention:The patient's response to intervention is accepting, motivated.     Progress toward goals: Progress as Expected        Patient reported outcomes:      Distress thermometer:       5/15/2025     4:52 PM 4/29/2025     3:37 PM 4/1/2025     8:06 PM 3/19/2025    12:57 PM 3/4/2025     3:10 PM 2/20/2025     5:23 PM 2/5/2025     8:02 PM   DISTRESS SCREENING   Distress Score 3 10 - Extreme Distress 3 4 3 7 1   Practical Concerns Taking care of myself;Taking care of others;Work;Finances;Safety Taking care of myself;Taking care of others;Work;Housing/Utilities;Finances;Treatment decisions Taking care of myself;Taking care of others;Finances Taking care of myself;Taking care of others;Work;Finances;Treatment decisions;Safety Taking care of others;Finances;Safety Taking care of myself;Taking care of others;Work;Safety;Finances    Social Concerns Relationship with family members Relationship with spouse or partner;Relationship with children None of these  None of these Relationship with children None of these    Emotional Concerns Worry or anxiety;Loneliness;Feelings of worthlessness or being a burden Worry or anxiety;Fear;Loneliness;Anger;Feelings of worthlessness or being a burden Worry or anxiety;Changes in appearance Worry or anxiety;Sadness or depression;Anger;Feelings of worthlessness or being a burden Fear;Changes in appearance;Feelings of worthlessness or being a burden Worry or anxiety;Sadness or depression;Grief or loss;Fear;Feelings of worthlessness or being a burden    Spiritual or Latter day Concerns None of these None of these None of these None of these None of these None of these    Physical Concerns Pain;Sleep;Changes in eating;Loss or change of physical abilities Pain;Fatigue;Changes in eating;Loss or change of physical abilities Pain;Fatigue;Loss or change of physical abilities Pain;Sleep;Fatigue;None of these Pain;Fatigue;Loss or change of physical abilities Pain;Fatigue;Changes in eating;Loss or change of physical abilities            PHQ-9=PHQ ANSWERS    Q1. Little interest or pleasure in doing things: Several days (05/15/25 1658)  Q2. Feeling down, depressed, or hopeless: Several days (05/15/25 1658)  Q3. Trouble falling or staying asleep, or sleeping too much: Several days (05/15/25 1658)  Q4. Feeling tired or having little energy: Several days (05/15/25 1658)  Q5. Poor appetite or overeating: More than half the days (05/15/25 1658)  Q6. Feeling bad about yourself - or that you are a failure or have let yourself or your family down: Not at all (05/15/25 1658)  Q7. Trouble concentrating on things, such as reading the newspaper or watching television: Not at all (05/15/25 1658)  Q8. Moving or speaking so slowly that other people could have noticed. Or the opposite - being so fidgety or restless that you have been moving around a lot more than usual: Not at all (05/15/25 1658)  Q9.      PHQ8 Score : 6 (05/15/25 1658)  PHQ-9 Total Score: 6  (05/15/25 6258) ; Initial visit: 7       DEANNA-7= Initial visit:6       5/15/2025     4:55 PM 4/29/2025     3:43 PM 4/1/2025     8:10 PM   GAD7   1. Feeling nervous, anxious, or on edge? 2 3 1   2. Not being able to stop or control worrying? 1 2 1   3. Worrying too much about different things? 1 3 0   4. Trouble relaxing? 1 1 0   5. Being so restless that it is hard to sit still? 0 2 0   6. Becoming easily annoyed or irritable? 1 3 1   7. Feeling afraid as if something awful might happen? 1 2 1   8. If you checked off any problems, how difficult have these problems made it for you to do your work, take care of things at home, or get along with other people? 1 1 1   DEANNA-7 Score 7 16 4          Client Strengths: verbal, intelligent, successful, good social support, good insight, commitment to wellness      Treatment Plan:individual psychotherapy  Target symptoms: adjustment  Why chosen therapy is appropriate versus another modality: relevant to diagnosis, patient responds to this modality, evidence based practice  Outcome monitoring methods: self-report, checklist/rating scale  Therapeutic intervention type: behavior modifying psychotherapy  Prognosis: Good    Goals from last visit: Met   Behavioral goals prior to next visit:    Exercise: as per PT   Stress management: increase prayer focus, PMR   Social engagement:    Nutrition:    Smoking Cessation:   Therapy:  journaling      Return to clinic: 3 weeks     Length of Service (minutes direct face-to-face contact): 45    Diagnosis:     ICD-10-CM ICD-9-CM   1. Adjustment disorder with mixed anxiety and depressed mood  F43.23 309.28             Xavier Núñez, PhD  [place] License #[id number]  [place] License #[id number]

## 2025-05-19 ENCOUNTER — CLINICAL SUPPORT (OUTPATIENT)
Dept: REHABILITATION | Facility: HOSPITAL | Age: 70
End: 2025-05-19
Payer: MEDICARE

## 2025-05-19 DIAGNOSIS — Z96.651 STATUS POST TOTAL RIGHT KNEE REPLACEMENT: Primary | ICD-10-CM

## 2025-05-19 PROCEDURE — 97014 ELECTRIC STIMULATION THERAPY: CPT

## 2025-05-19 PROCEDURE — 97530 THERAPEUTIC ACTIVITIES: CPT

## 2025-05-19 PROCEDURE — 97112 NEUROMUSCULAR REEDUCATION: CPT

## 2025-05-19 NOTE — PROGRESS NOTES
Outpatient Rehab    Physical Therapy Visit    Patient Name: Verena Toscano  MRN: [id number]  YOB: 1955  Encounter Date: 5/19/2025    Therapy Diagnosis:   Encounter Diagnosis   Name Primary?    Status post total right knee replacement Yes     Physician: Navi Fernandez III, *    Physician Orders: Eval and Treat  Medical Diagnosis: Synovial cyst of popliteal space (Baker), right knee  Unilateral primary osteoarthritis, right knee  Pain in right knee    Visit # / Visits Authorized:  14 / 20  Insurance Authorization Period: 1/1/2025 to 12/31/2025  Date of Evaluation: 5/14/2025  Plan of Care Certification: 5/14/2025 to 7/2/2025      PT/PTA: PT   Number of PTA visits since last PT visit:0  Time In: 1010   Time Out: 1118  Total Time (in minutes): 68   Total Billable Time (in minutes):  68 minutes    FOTO: See media section.     Precautions:       Subjective   Patient reports feeling weak today. She has not been able to eat a lot due to GI issues..         Objective            Treatment:  Balance/Neuromuscular Re-Education  NMR 1: Patient education: HEP compliance, swelling management, ROM expectations post TKA, importance of extension ROM for proper gait cycle  NMR 2: Russian NMES 10 sec on/30 sec off including: quad sets with towel roll x 10 minutes, SAQs with medium bolster x 12 minutes, SAQs with 1/2 foam x 10 minutes, LAQs at EOM x 10 minutes  NMR 5: Recumbent bike for gait mechanics; 10 minutes, Lvl 2  Therapeutic Activity  TA 1: Sit to stands from high low mat: 2 x 10 reps - cues for weight shifting and terminal knee extension  TA 2: Education on activity modification    Time Entry(in minutes):  Neuromuscular Re-Education Time Entry: 60  Therapeutic Activity Time Entry: 8    Assessment & Plan   Assessment: Verena is 3 weeks, 1 day s/p R TKA. Continued NMES for quadriceps motor control. INtroduced sit to stands for functional mobility. Will continue to progress as  tolerated.  Evaluation/Treatment Tolerance: Patient tolerated treatment well    Patient will continue to benefit from skilled outpatient physical therapy to address the deficits listed in the problem list box on initial evaluation, provide pt/family education and to maximize pt's level of independence in the home and community environment.     Patient's spiritual, cultural, and educational needs considered and patient agreeable to plan of care and goals.           Plan: Consider step ups next visit    Goals:   Active       Ambulation/movement       Patient will walk for 20 minutes with least restrictive assistive device and report </= 3/10 pain (Progressing)       Start:  05/14/25    Expected End:  07/02/25               Changing body position       Patient will demonstrate moving sit to stand 10x in 30 seconds to demonstrate functional mobility (Progressing)       Start:  05/14/25    Expected End:  07/02/25               Functional outcome       Patient will show a significant change in FOTO patient-reported outcome tool to demonstrate subjective improvement (Progressing)       Start:  05/14/25    Expected End:  07/02/25            Patient stated goal: to return to prior level of function without pain  (Progressing)       Start:  05/14/25    Expected End:  07/02/25            Patient will demonstrate independence in home program for support of progression (Progressing)       Start:  05/14/25    Expected End:  07/02/25               Pain       Patient will report a 2 point reduction in pain while performing physical therapy exercises (Progressing)       Start:  05/14/25    Expected End:  07/02/25               Range of Motion       Patient will achieve right knee ROM of  0-120 degrees  (Progressing)       Start:  05/14/25    Expected End:  07/02/25                Lois Hebert, PT, DPT

## 2025-05-19 NOTE — PROGRESS NOTES
Outpatient Rehab    Physical Therapy Visit    Patient Name: Verena Toscano  MRN: [id number]  YOB: 1955  Encounter Date: 5/16/2025    Therapy Diagnosis:   Encounter Diagnosis   Name Primary?    Status post total right knee replacement Yes     Physician: Navi Fernandez III, *    Physician Orders: Eval and Treat  Medical Diagnosis: Synovial cyst of popliteal space (Baker), right knee  Unilateral primary osteoarthritis, right knee  Pain in right knee    Visit # / Visits Authorized:  13 / 20  Insurance Authorization Period: 1/1/2025 to 12/31/2025  Date of Evaluation: 5/14/2025  Plan of Care Certification: 5/14/2025 to 7/2/2025      PT/PTA: PT   Number of PTA visits since last PT visit:0  Time In: 0958   Time Out: 1054  Total Time (in minutes): 56   Total Billable Time (in minutes):  56 minutes    FOTO:  See media section.     Precautions:       Subjective   Patient reports no new pain in knee..         Objective            Treatment:  Balance/Neuromuscular Re-Education  NMR 1: Patient education: HEP compliance, swelling management, ROM expectations post TKA, importance of extension ROM for proper gait cycle  NMR 2: Russian NMES 10 sec on/30 sec off including: quad sets with towel roll x 10 minutes, SAQs with medium bolster x 12 minutes, SAQs with 1/2 foam x 10 minutes, LAQs at EOM x 5 minutes  NMR 4: Ambulation with single point cane with supervision around clinic: ~150 feet  NMR 5: Recumbent bike for gait mechanics; 10 minutes, Lvl 1    Time Entry(in minutes):  Neuromuscular Re-Education Time Entry: 56    Assessment & Plan   Assessment: Verena is 3 weeks, 1 day s/p R TKA. Introduced recumbent bike today to improve reciprocal gait pattern. Introduced single point cane ambulation for household distances. Continued NMES for quadriceps motor control. Will continue to progress as tolerated.  Evaluation/Treatment Tolerance: Patient tolerated treatment well    Patient will continue to benefit from  skilled outpatient physical therapy to address the deficits listed in the problem list box on initial evaluation, provide pt/family education and to maximize pt's level of independence in the home and community environment.     Patient's spiritual, cultural, and educational needs considered and patient agreeable to plan of care and goals.           Plan: Consider sit to stands next visit    Goals:   Active       Ambulation/movement       Patient will walk for 20 minutes with least restrictive assistive device and report </= 3/10 pain (Progressing)       Start:  05/14/25    Expected End:  07/02/25               Changing body position       Patient will demonstrate moving sit to stand 10x in 30 seconds to demonstrate functional mobility (Progressing)       Start:  05/14/25    Expected End:  07/02/25               Functional outcome       Patient will show a significant change in FOTO patient-reported outcome tool to demonstrate subjective improvement (Progressing)       Start:  05/14/25    Expected End:  07/02/25            Patient stated goal: to return to prior level of function without pain  (Progressing)       Start:  05/14/25    Expected End:  07/02/25            Patient will demonstrate independence in home program for support of progression (Progressing)       Start:  05/14/25    Expected End:  07/02/25               Pain       Patient will report a 2 point reduction in pain while performing physical therapy exercises (Progressing)       Start:  05/14/25    Expected End:  07/02/25               Range of Motion       Patient will achieve right knee ROM of  0-120 degrees  (Progressing)       Start:  05/14/25    Expected End:  07/02/25                Lois Hebert, PT, DPT

## 2025-05-21 ENCOUNTER — CLINICAL SUPPORT (OUTPATIENT)
Dept: REHABILITATION | Facility: HOSPITAL | Age: 70
End: 2025-05-21
Payer: MEDICARE

## 2025-05-21 DIAGNOSIS — Z96.651 STATUS POST TOTAL RIGHT KNEE REPLACEMENT: Primary | ICD-10-CM

## 2025-05-21 PROCEDURE — 97112 NEUROMUSCULAR REEDUCATION: CPT | Mod: CQ

## 2025-05-21 PROCEDURE — 97014 ELECTRIC STIMULATION THERAPY: CPT | Mod: CQ

## 2025-05-21 NOTE — PROGRESS NOTES
"    Outpatient Rehab    Physical Therapy Visit    Patient Name: Verena Toscano  MRN: [id number]  YOB: 1955  Encounter Date: 5/21/2025    Therapy Diagnosis:   Encounter Diagnosis   Name Primary?    Status post total right knee replacement Yes     Physician: Navi Fernandez III, *    Physician Orders: Eval and Treat  Medical Diagnosis: Synovial cyst of popliteal space (Baker), right knee  Unilateral primary osteoarthritis, right knee  Pain in right knee    Visit # / Visits Authorized:  15 / 20  Insurance Authorization Period: 1/1/2025 to 12/31/2025  Date of Evaluation: 5/14/2025  Plan of Care Certification: 5/14/2025 to 7/2/2025      PT/PTA: PTA   Number of PTA visits since last PT visit:1  Time In: 0900   Time Out: 1008  Total Time (in minutes): 68   Total Billable Time (in minutes): 30    FOTO:  Intake Score: 61%  Survey Score 2:  %  Survey Score 3:  %    Precautions: None    Subjective   Patient reports her knee "itches all the time." She notes some discomfort along the inside of her knee, but denies any real pain. She reports she is not sleeping well. Followed up with her GI doctor and was given some ideas to try.  Pain reported as 0/10. Right knee    Objective   Range of Motion:   Knee Right Active   Flexion Post: 125 degrees AROM   Extension Post: 0 degrees     Treatment:  Balance/Neuromuscular Re-Education  NMR 1: Patient education: HEP compliance, swelling management, ROM expectations post TKA, importance of extension ROM for proper gait cycle  NMR 2: Russian NMES 10 sec on/20 sec off including: SAQs with medium bolster + 2 lb AW x 8 minutes, SAQs with 1/2 foam x 8 minutes (NP), LAQs at EOM + 2 lb AW x 8 minutes  NMR 3: Heel slides: 10 second hold, 10 reps  NMR 4: Ambulation with single point cane with supervision around clinic: ~150 feet  NMR 5: Recumbent Bike for gait mechanics; 10 minutes, Lvl 2  Therapeutic Activity  TA 1: Sit to stands from high low mat: 3 x 10 reps - cues for weight " shifting and terminal knee extension  TA 3: Step ups on to 4-inch step: 3 x 10 reps  TA 4: Patient education: DOMS, load progression, activity modification    Time Entry(in minutes):  E-Stim (Unattended) Time Entry: 16  Hot/Cold Pack Time Entry: 8  Neuromuscular Re-Education Time Entry: 48  Therapeutic Activity Time Entry: 12    Assessment & Plan   Assessment: Good tolerance overall noting adequate muscle response throughout. Addition of step ups to improve tolerance ot functional ADL's. Cueing for equal weight bearing with sit to stands. Ambulated in clinic with single point cane and encouraged use of one outside in the community. Measured 0-125 AROM post session.  Evaluation/Treatment Tolerance: Patient tolerated treatment well    Patient will continue to benefit from skilled outpatient physical therapy to address the deficits listed in the problem list box on initial evaluation, provide pt/family education and to maximize pt's level of independence in the home and community environment.     Patient's spiritual, cultural, and educational needs considered and patient agreeable to plan of care and goals.           Plan: Will continue per POC towards treatment goals. PT/PTA met face to face to discuss patient's treatment plan and progress towards established goals. Patient will be seen by physical therapist every sixth visit and minimally once per month.    Goals:   Active       Ambulation/movement       Patient will walk for 20 minutes with least restrictive assistive device and report </= 3/10 pain (Progressing)       Start:  05/14/25    Expected End:  07/02/25               Changing body position       Patient will demonstrate moving sit to stand 10x in 30 seconds to demonstrate functional mobility (Progressing)       Start:  05/14/25    Expected End:  07/02/25               Functional outcome       Patient will show a significant change in FOTO patient-reported outcome tool to demonstrate subjective improvement  (Progressing)       Start:  05/14/25    Expected End:  07/02/25            Patient stated goal: to return to prior level of function without pain  (Progressing)       Start:  05/14/25    Expected End:  07/02/25            Patient will demonstrate independence in home program for support of progression (Progressing)       Start:  05/14/25    Expected End:  07/02/25               Pain       Patient will report a 2 point reduction in pain while performing physical therapy exercises (Progressing)       Start:  05/14/25    Expected End:  07/02/25               Range of Motion       Patient will achieve right knee ROM of  0-120 degrees  (Progressing)       Start:  05/14/25    Expected End:  07/02/25                Leni Amor, PTA

## 2025-05-23 ENCOUNTER — CLINICAL SUPPORT (OUTPATIENT)
Dept: REHABILITATION | Facility: HOSPITAL | Age: 70
End: 2025-05-23
Payer: MEDICARE

## 2025-05-23 DIAGNOSIS — Z96.651 STATUS POST TOTAL RIGHT KNEE REPLACEMENT: Primary | ICD-10-CM

## 2025-05-23 PROCEDURE — 97530 THERAPEUTIC ACTIVITIES: CPT | Mod: CQ

## 2025-05-23 PROCEDURE — 97112 NEUROMUSCULAR REEDUCATION: CPT | Mod: CQ

## 2025-05-23 PROCEDURE — 97014 ELECTRIC STIMULATION THERAPY: CPT | Mod: CQ

## 2025-05-23 NOTE — PROGRESS NOTES
"  Outpatient Rehab    Physical Therapy Visit    Patient Name: Verena Toscano  MRN: [id number]  YOB: 1955  Encounter Date: 5/23/2025    Therapy Diagnosis:   Encounter Diagnosis   Name Primary?    Status post total right knee replacement Yes     Physician: Navi Fernandez III, *    Physician Orders: Eval and Treat  Medical Diagnosis: Synovial cyst of popliteal space (Baker), right knee  Unilateral primary osteoarthritis, right knee  Pain in right knee    Visit # / Visits Authorized:  16 / 20  Insurance Authorization Period: 1/1/2025 to 12/31/2025  Date of Evaluation: 5/14/2025  Plan of Care Certification: 5/14/2025 to 7/2/2025      PT/PTA: PTA   Number of PTA visits since last PT visit:2  Time In: 0900   Time Out: 1000  Total Time (in minutes): 60   Total Billable Time (in minutes): 59    FOTO:  Intake Score: 61%  Survey Score 2: 66%  Survey Score 3:  %    Precautions: none    Subjective   Patient reports increased knee stiffness that she feels is present "under the bone". She has been working on walking around her home with her cane.  Pain reported as 2/10. Right knee    Objective  Objective Measures updated at progress report unless specified.     Treatment:  Balance/Neuromuscular Re-Education  NMR 1: Patient education: HEP compliance, swelling management, ROM expectations post TKA, importance of extension ROM for proper gait cycle  NMR 2: Russian NMES 10 sec on/20 sec off including: LAQs at EOM + 2 lb AW x 10 minutes  NMR 4: Ambulation with single point cane with supervision around clinic: ~150 feet  NMR 5: Recumbent Bike for gait mechanics; 10 minutes, Lvl 2  NMR 6: Standing tke with ball: 10 second hold, 20 reps  Therapeutic Activity  TA 1: Sit to stands from elevated surface: 3 x 10 reps - cues for weight shifting and terminal knee extension  TA 2: Lateral walks at half wall: 5 laps  TA 3: Step ups on to 4-inch step: 3 x 10 reps  TA 4: Patient education: DOMS, load progression, activity " modification    Time Entry(in minutes):  E-Stim (Unattended) Time Entry: 10  Neuromuscular Re-Education Time Entry: 37  Therapeutic Activity Time Entry: 23    Assessment & Plan   Assessment: Addition of standing tke and lateral walks to improve weight bearing tolerance and terminal knee extension. Good tolerance overall noting adequate muscle response throughout. Ambulation in clinic with single point cane; encourage use of cane in the community.  Evaluation/Treatment Tolerance: Patient tolerated treatment well    The patient will continue to benefit from skilled outpatient physical therapy in order to address the deficits listed in the problem list on the initial evaluation, provide patient and family education, and maximize the patients level of independence in the home and community environments.     The patient's spiritual, cultural, and educational needs were considered, and the patient is agreeable to the plan of care and goals.           Plan: Consider Shuttle double and single leg press next visit. Will continue per POC towards treatment goals. PT/PTA met face to face to discuss patient's treatment plan and progress towards established goals. Patient will be seen by physical therapist every sixth visit and minimally once per month.    Goals:   Active       Ambulation/movement       Patient will walk for 20 minutes with least restrictive assistive device and report </= 3/10 pain (Progressing)       Start:  05/14/25    Expected End:  07/02/25               Changing body position       Patient will demonstrate moving sit to stand 10x in 30 seconds to demonstrate functional mobility (Progressing)       Start:  05/14/25    Expected End:  07/02/25               Functional outcome       Patient will show a significant change in FOTO patient-reported outcome tool to demonstrate subjective improvement (Progressing)       Start:  05/14/25    Expected End:  07/02/25            Patient stated goal: to return to prior  level of function without pain  (Progressing)       Start:  05/14/25    Expected End:  07/02/25            Patient will demonstrate independence in home program for support of progression (Progressing)       Start:  05/14/25    Expected End:  07/02/25               Pain       Patient will report a 2 point reduction in pain while performing physical therapy exercises (Progressing)       Start:  05/14/25    Expected End:  07/02/25               Range of Motion       Patient will achieve right knee ROM of  0-120 degrees  (Progressing)       Start:  05/14/25    Expected End:  07/02/25                Leni Amor, PTA

## 2025-05-26 ENCOUNTER — CLINICAL SUPPORT (OUTPATIENT)
Dept: REHABILITATION | Facility: HOSPITAL | Age: 70
End: 2025-05-26
Payer: MEDICARE

## 2025-05-26 DIAGNOSIS — Z96.651 STATUS POST TOTAL RIGHT KNEE REPLACEMENT: Primary | ICD-10-CM

## 2025-05-26 PROCEDURE — 97530 THERAPEUTIC ACTIVITIES: CPT | Mod: CQ

## 2025-05-26 PROCEDURE — 97014 ELECTRIC STIMULATION THERAPY: CPT | Mod: CQ

## 2025-05-26 NOTE — PROGRESS NOTES
Outpatient Rehab    Physical Therapy Visit    Patient Name: Verena Toscano  MRN: [id number]  YOB: 1955  Encounter Date: 5/26/2025    Therapy Diagnosis:   Encounter Diagnosis   Name Primary?    Status post total right knee replacement Yes     Physician: Navi Fernandez III, *    Physician Orders: Eval and Treat  Medical Diagnosis: Synovial cyst of popliteal space (Baker), right knee  Unilateral primary osteoarthritis, right knee  Pain in right knee    Visit # / Visits Authorized:  17 / 20  Insurance Authorization Period: 1/1/2025 to 12/31/2025  Date of Evaluation: 5/14/2025  Plan of Care Certification: 5/14/2025 to 7/2/2025      PT/PTA: PTA   Number of PTA visits since last PT visit:3  Time In: 1000   Time Out: 1106  Total Time (in minutes): 66   Total Billable Time (in minutes): 30    FOTO:  Intake Score: 61%  Survey Score 2: 66%  Survey Score 3:  %    Precautions: None    Subjective   Patient reports she has been using her cane. She notes going out to eat over the weekend and using it as well. Some discomfort noted in knee.  Pain reported as 2/10. Right knee    Objective  Objective Measures updated at progress report unless specified.     Treatment:  Balance/Neuromuscular Re-Education  NMR 1: Patient education: HEP compliance, swelling management, ROM expectations post TKA, importance of extension ROM for proper gait cycle  NMR 2: Russian NMES 10 sec on/20 sec off including: LAQs at EOM + 2 lb AW x 12 minutes  NMR 4: Ambulation with single point cane with supervision around clinic: ~150 feet  NMR 5: Recumbent Bike for gait mechanics; 10 minutes, Lvl 3  NMR 6: Standing tke with ball: 10 second hold, 20 reps  Therapeutic Activity  TA 1: Sit to stands from elevated surface: 3 x 10 reps - cues for weight shifting and terminal knee extension  TA 2: Lateral walks at half wall: 5 laps  TA 3: Step ups on to 4-inch step: 3 x 10 reps  TA 4: Patient education: DOMS, load progression, activity  modification    Time Entry(in minutes):  E-Stim (Unattended) Time Entry: 12  Hot/Cold Pack Time Entry: 10  Neuromuscular Re-Education Time Entry: 26  Therapeutic Activity Time Entry: 30    Assessment & Plan   Assessment: Overall good tolerance noting adequate muscle response throughout. She is progressing well with current POC.  Evaluation/Treatment Tolerance: Patient tolerated treatment well    The patient will continue to benefit from skilled outpatient physical therapy in order to address the deficits listed in the problem list on the initial evaluation, provide patient and family education, and maximize the patients level of independence in the home and community environments.     The patient's spiritual, cultural, and educational needs were considered, and the patient is agreeable to the plan of care and goals.           Plan: Consider Shuttle double and single leg press. Will continue per POC towards treatment goals. PT/PTA met face to face to discuss patient's treatment plan and progress towards established goals. Patient will be seen by physical therapist every sixth visit and minimally once per month.    Goals:   Active       Ambulation/movement       Patient will walk for 20 minutes with least restrictive assistive device and report </= 3/10 pain (Progressing)       Start:  05/14/25    Expected End:  07/02/25               Changing body position       Patient will demonstrate moving sit to stand 10x in 30 seconds to demonstrate functional mobility (Progressing)       Start:  05/14/25    Expected End:  07/02/25               Functional outcome       Patient will show a significant change in FOTO patient-reported outcome tool to demonstrate subjective improvement (Progressing)       Start:  05/14/25    Expected End:  07/02/25            Patient stated goal: to return to prior level of function without pain  (Progressing)       Start:  05/14/25    Expected End:  07/02/25            Patient will demonstrate  independence in home program for support of progression (Progressing)       Start:  05/14/25    Expected End:  07/02/25               Pain       Patient will report a 2 point reduction in pain while performing physical therapy exercises (Progressing)       Start:  05/14/25    Expected End:  07/02/25               Range of Motion       Patient will achieve right knee ROM of  0-120 degrees  (Progressing)       Start:  05/14/25    Expected End:  07/02/25                Leni Amor, PTA

## 2025-05-28 ENCOUNTER — CLINICAL SUPPORT (OUTPATIENT)
Dept: REHABILITATION | Facility: HOSPITAL | Age: 70
End: 2025-05-28
Payer: MEDICARE

## 2025-05-28 ENCOUNTER — TELEPHONE (OUTPATIENT)
Dept: PSYCHIATRY | Facility: CLINIC | Age: 70
End: 2025-05-28
Payer: MEDICARE

## 2025-05-28 ENCOUNTER — OFFICE VISIT (OUTPATIENT)
Dept: PSYCHIATRY | Facility: CLINIC | Age: 70
End: 2025-05-28
Payer: MEDICARE

## 2025-05-28 DIAGNOSIS — Z96.651 STATUS POST TOTAL RIGHT KNEE REPLACEMENT: Primary | ICD-10-CM

## 2025-05-28 DIAGNOSIS — F43.23 ADJUSTMENT DISORDER WITH MIXED ANXIETY AND DEPRESSED MOOD: Primary | ICD-10-CM

## 2025-05-28 PROCEDURE — 97530 THERAPEUTIC ACTIVITIES: CPT | Mod: CQ

## 2025-05-28 NOTE — PROGRESS NOTES
Outpatient Rehab    Physical Therapy Visit    Patient Name: Verena Toscano  MRN: [id number]  YOB: 1955  Encounter Date: 5/28/2025    Therapy Diagnosis:   Encounter Diagnosis   Name Primary?    Status post total right knee replacement Yes     Physician: Navi Fernandez III, *    Physician Orders: Eval and Treat  Medical Diagnosis: Synovial cyst of popliteal space (Baker), right knee  Unilateral primary osteoarthritis, right knee  Pain in right knee    Visit # / Visits Authorized:  18 / 20  Insurance Authorization Period: 1/1/2025 to 12/31/2025  Date of Evaluation: 5/14/2025  Plan of Care Certification: 5/14/2025 to 7/2/2025      PT/PTA: PTA   Number of PTA visits since last PT visit:4  Time In: 0800   Time Out: 0903  Total Time (in minutes): 63   Total Billable Time (in minutes): 30    FOTO:  Intake Score: 61%  Survey Score 2: 66%  Survey Score 3:  %    Precautions: None    Subjective   Patient reports pain in her knee when she walks, she feels like it's in the joint itself.  Pain reported as 3/10. Right knee    Objective  Objective Measures updated at progress report unless specified.     Treatment:  Manual Therapy  MT 1: Scar massage; education on performing at home to improve tissue mobility  MT 2: Patella mobs  Balance/Neuromuscular Re-Education  NMR 1: Patient education: HEP compliance, swelling management, ROM expectations post TKA, importance of extension ROM for proper gait cycle  NMR 2: LAQs at EOM + 2 lb AW: 10 second hold, 5 minutes  NMR 4: Ambulation without assistive device with supervision around clinic: ~150 feet  Therapeutic Activity  TA 1: Sit to stands from elevated surface: 3 x 10 reps + 3 lb DB's - cues for weight shifting and terminal knee extension with use of mirror  TA 2: Lateral walks at half wall: 6 laps  TA 3: Step ups on to 6-inch step: 3 x 10 reps  TA 4: Patient education: DOMS, load progression, activity modification  TA 5: Recumbent Bike for gait mechanics; 5  minutes, Lvl 4  TA 6: Shuttle DL Press: 37.5 lbs, 3 x 10 reps -- SL Press: 12.5 lbs, 2 x 10 reps RLE only  Modalities  Cryotherapy (Minutes\Location): cold pack for 5 minutes to Right knee post session    Time Entry(in minutes):  Hot/Cold Pack Time Entry: 5  Manual Therapy Time Entry: 12  Neuromuscular Re-Education Time Entry: 12  Therapeutic Activity Time Entry: 34    Assessment & Plan   Assessment: Addition of shuttle double and single leg press this session with good tolerance. Able to progress to 6-inch step. She requires cueing for equal weight distribution with sit to stands; use of mirror today for visual cueing.  Evaluation/Treatment Tolerance: Patient tolerated treatment well    The patient will continue to benefit from skilled outpatient physical therapy in order to address the deficits listed in the problem list on the initial evaluation, provide patient and family education, and maximize the patients level of independence in the home and community environments.     The patient's spiritual, cultural, and educational needs were considered, and the patient is agreeable to the plan of care and goals.           Plan: Will continue per POC towards treatment goals. PT/PTA met face to face to discuss patient's treatment plan and progress towards established goals. Patient will be seen by physical therapist every sixth visit and minimally once per month.    Goals:   Active       Ambulation/movement       Patient will walk for 20 minutes with least restrictive assistive device and report </= 3/10 pain (Progressing)       Start:  05/14/25    Expected End:  07/02/25               Changing body position       Patient will demonstrate moving sit to stand 10x in 30 seconds to demonstrate functional mobility (Progressing)       Start:  05/14/25    Expected End:  07/02/25               Functional outcome       Patient will show a significant change in FOTO patient-reported outcome tool to demonstrate subjective  improvement (Progressing)       Start:  05/14/25    Expected End:  07/02/25            Patient stated goal: to return to prior level of function without pain  (Progressing)       Start:  05/14/25    Expected End:  07/02/25            Patient will demonstrate independence in home program for support of progression (Progressing)       Start:  05/14/25    Expected End:  07/02/25               Pain       Patient will report a 2 point reduction in pain while performing physical therapy exercises (Progressing)       Start:  05/14/25    Expected End:  07/02/25               Range of Motion       Patient will achieve right knee ROM of  0-120 degrees  (Progressing)       Start:  05/14/25    Expected End:  07/02/25                Leni Amor, PTA

## 2025-05-28 NOTE — PROGRESS NOTES
Telemedicine PSYCHO-ONCOLOGY NOTE/ Individual Psychotherapy     Consultation started: 5/28/2025 at  3:05 PM by phone, 3:13 PM by video  The patient location is: Patient home in [place], [place] (Provider licensed in [place], [place], [place])  Virtual visit with synchronous audio and video  Patient alone at the time of consultation    Crisis Disclaimer: Patient was informed that due to the virtual nature of the visit, that if a crisis develops, protocols will be implemented to ensure patient safety, including but not limited to: 1) Initiating a welfare check with local Law Enforcement, 2) Calling 1/National Crisis Hotline, and/or 3) Initiating PEC/CEC procedures.       Each patient provided medical services by telemedicine is:  (1) informed of the relationship between the physician and patient and the respective role of any other health care provider with respect to management of the patient; and (2) notified that he or she may decline to receive medical services by telemedicine and may withdraw from such care at any time.    Date: 5/28/2025   Site of therapist:  [facility]              Therapeutic Intervention: Met with patient.  Outpatient - Behavior modifying psychotherapy 45 min - CPT code 34961  The patient's last visit with me was on 5/16/2025.    Problem list  Patient Active Problem List   Diagnosis    Cough    Cognitive complaints    Adjustment disorder with mixed anxiety and depressed mood    Cervical vertigo    Dyslipidemia    Essential hypertension    Hormone replacement therapy    Intertrigo    Neck pain    Vitamin D deficiency    Vulvitis    Caregiver stress    Right knee pain    S/P total knee arthroplasty       Chief complaint/reason for encounter: anxiety, depression  Met with patient to evaluate psychosocial adaptation to decreased mobility, caregiving    Current Medications  Current Outpatient Medications   Medication    acetaminophen (TYLENOL) 500 MG tablet    ammonium lactate 12 % Crea    econazole  nitrate 1 % cream    estradiol (ESTRACE) 1 MG tablet    loratadine (CLARITIN) 10 mg tablet    methocarbamoL (ROBAXIN) 750 MG Tab    nystatin (MYCOSTATIN) cream    omeprazole (PRILOSEC) 10 MG capsule    triamterene-hydrochlorothiazide 37.5-25 mg (DYAZIDE) 37.5-25 mg per capsule     No current facility-administered medications for this visit.       Objective:  Verena Toscano arrived promptly for the session.  Ms. Toscano was independently ambulatory at the time of session. The patient was fully cooperative throughout the session.  Appearance: age appropriate, casually  dressed, well groomed  Behavior/Cooperation: friendly and cooperative  Speech: normal in rate, volume, and tone and appropriate quality, quantity and organization of sentences  Mood:euthymic  Affect: tearful  Thought Process: goal-directed, logical  Thought Content: normal,  No delusions or paranoia; did not appear to be responding to internal stimuli during the session  Orientation: grossly intact  Memory: Grossly intact  Attention Span/Concentration: Attends to session without distraction; reports no difficulty  Fund of Knowledge: average  Estimate of Intelligence: average from verbal skills and history  Cognition: grossly intact  Insight: patient has awareness of illness; good insight into own behavior and behavior of others  Judgment: the patient's behavior is adequate to circumstances    Interval history and content of current session: Patient discussed events and activities since the time of last visit. Her knee replacement recovery is going well. Outpatient PT is challenging, but manageable. She discussed her ongoing emotional reaction to her surgery/recovery. Discussed goals (going to [place] Friday). She feels she is learning a lot about illness coping and is proud of herself for her efforts.     33th anniversary this weekend.    Prior: discussion of sexual changes: Enjoyable and fulfilling sex life prior to 's illness. Historical use  of Viagra, Cialis. 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 Since Padcev, his ability to have an erection (or ejaculate without an erection) is almost gone. She feels his interest is low (although they still cuddle and show other physical affection). She wonders about his thinking about their change in interaction and whether he is interested in other potential assistive devices for intercourse. (Her drive has not changed, is comfortable masturbating, unsure how he thinks about her sexual interest).     Risk parameters:   Patient reports no suicidal ideation  Patient reports no homicidal ideation  Patient reports no self-injurious behavior  Patient reports no violent behavior   Safety needs:  None at this time      Verbal deficits: None     Patient's response to intervention:The patient's response to intervention is accepting, motivated.     Progress toward goals: Progress as Expected        Patient reported outcomes:      Distress thermometer:       5/28/2025    11:21 AM 5/15/2025     4:52 PM 4/29/2025     3:37 PM 4/1/2025     8:06 PM 3/19/2025    12:57 PM 3/4/2025     3:10 PM 2/20/2025     5:23 PM   DISTRESS SCREENING   Distress Score 3 3 10 - Extreme Distress 3 4 3 7   Practical Concerns Taking care of myself;Taking care of others;Work;Finances;Safety Taking care of myself;Taking care of others;Work;Finances;Safety Taking care of myself;Taking care of others;Work;Housing/Utilities;Finances;Treatment decisions Taking care of myself;Taking care of others;Finances Taking care of myself;Taking care of others;Work;Finances;Treatment decisions;Safety Taking care of others;Finances;Safety Taking care of myself;Taking care of others;Work;Safety;Finances   Social Concerns None of these Relationship with family members Relationship with spouse or partner;Relationship with children None of these None of these Relationship with children None  of these   Emotional Concerns Worry or anxiety;Sadness or depression;Fear;Anger Worry or anxiety;Loneliness;Feelings of worthlessness or being a burden Worry or anxiety;Fear;Loneliness;Anger;Feelings of worthlessness or being a burden Worry or anxiety;Changes in appearance Worry or anxiety;Sadness or depression;Anger;Feelings of worthlessness or being a burden Fear;Changes in appearance;Feelings of worthlessness or being a burden Worry or anxiety;Sadness or depression;Grief or loss;Fear;Feelings of worthlessness or being a burden   Spiritual or Religion Concerns None of these None of these None of these None of these None of these None of these None of these   Physical Concerns Pain;Sleep;Fatigue;Loss or change of physical abilities Pain;Sleep;Changes in eating;Loss or change of physical abilities Pain;Fatigue;Changes in eating;Loss or change of physical abilities Pain;Fatigue;Loss or change of physical abilities Pain;Sleep;Fatigue;None of these Pain;Fatigue;Loss or change of physical abilities Pain;Fatigue;Changes in eating;Loss or change of physical abilities           PHQ-9=4 PHQ8 Score : (Patient-Rptd) (P) 0 (05/28/25 1510)  PHQ-9 Total Score: (Patient-Rptd) (P) 0 (05/28/25 1510) ; Initial visit: 7       DEANNA-7=3 Initial visit:6       5/28/2025     3:09 PM 5/28/2025    11:23 AM 5/15/2025     4:55 PM   GAD7   1. Feeling nervous, anxious, or on edge? 0 1 2   2. Not being able to stop or control worrying? 0 1 1   3. Worrying too much about different things? 0 0 1   4. Trouble relaxing? 0 1 1   5. Being so restless that it is hard to sit still? 0 0 0   6. Becoming easily annoyed or irritable? 0 0 1   7. Feeling afraid as if something awful might happen? 0 0 1   8. If you checked off any problems, how difficult have these problems made it for you to do your work, take care of things at home, or get along with other people? 0 1 1   DEANNA-7 Score 0  3  7       Patient-reported          Client Strengths: verbal,  intelligent, successful, good social support, good insight, commitment to wellness      Treatment Plan:individual psychotherapy  Target symptoms: adjustment  Why chosen therapy is appropriate versus another modality: relevant to diagnosis, patient responds to this modality, evidence based practice  Outcome monitoring methods: self-report, checklist/rating scale  Therapeutic intervention type: behavior modifying psychotherapy  Prognosis: Good    Goals from last visit: Met   Behavioral goals prior to next visit:    Exercise: as per PT   Stress management: increase prayer focus, PMR   Social engagement:    Nutrition:    Smoking Cessation:   Therapy:  journaling      Return to clinic: 2 weeks     Length of Service (minutes direct face-to-face contact): 45    Diagnosis:     ICD-10-CM ICD-9-CM   1. Adjustment disorder with mixed anxiety and depressed mood  F43.23 309.28               Xavier Núñez, PhD  [place] License #[id number]  [place] License #[id number]

## 2025-05-28 NOTE — TELEPHONE ENCOUNTER
Bina Molina is a 60 y.o. female presents in office to be seen fr physical.    Health Maintenance Due   Topic Date Due    DTaP/Tdap/Td series (1 - Tdap) 02/23/1980       1. Have you been to the ER, urgent care clinic since your last visit? Hospitalized since your last visit?no    2. Have you seen or consulted any other health care providers outside of the [address] [place] [address] since your last visit? Include any pap smears or colon screening.  no Reached patient by phone, then able to do video visit.    ----- Message from Gaye sent at 5/28/2025  2:59 PM CDT -----  Regarding: Late Patient  Name Of Caller:   Judd Preference:  [phone] Nature of call:   Pt's having technical issues with today's virtual appt. They won't be able to log in on time.

## 2025-05-29 ENCOUNTER — CLINICAL SUPPORT (OUTPATIENT)
Dept: REHABILITATION | Facility: HOSPITAL | Age: 70
End: 2025-05-29
Payer: MEDICARE

## 2025-05-29 DIAGNOSIS — Z96.651 STATUS POST TOTAL RIGHT KNEE REPLACEMENT: Primary | ICD-10-CM

## 2025-05-29 PROCEDURE — 97112 NEUROMUSCULAR REEDUCATION: CPT | Mod: CQ

## 2025-05-29 PROCEDURE — 97530 THERAPEUTIC ACTIVITIES: CPT | Mod: CQ

## 2025-05-29 NOTE — PROGRESS NOTES
Outpatient Rehab    Physical Therapy Visit    Patient Name: Verena Toscano  MRN: [id number]  YOB: 1955  Encounter Date: 5/29/2025    Therapy Diagnosis:   Encounter Diagnosis   Name Primary?    Status post total right knee replacement Yes     Physician: Navi Fernandez III, *    Physician Orders: Eval and Treat  Medical Diagnosis: Synovial cyst of popliteal space (Baker), right knee  Unilateral primary osteoarthritis, right knee  Pain in right knee    Visit # / Visits Authorized:  19 / 20  Insurance Authorization Period: 1/1/2025 to 12/31/2025  Date of Evaluation: 5/14/2025  Plan of Care Certification: 5/14/2025 to 7/2/2025      PT/PTA: PTA   Number of PTA visits since last PT visit:5  Time In: 1430   Time Out: 1530  Total Time (in minutes): 60   Total Billable Time (in minutes): 60    FOTO:  Intake Score: 61%  Survey Score 2: 66%  Survey Score 3:  %    Precautions: None    Subjective   Patient reports that she bought some Cocoa Butter and has been using that on her knee. She found that when she approaches a curb she is unsure how to properly step up and would like to review that today.  Pain reported as 3/10. Right knee    Objective  Objective Measures updated at progress report unless specified.     Treatment:  Balance/Neuromuscular Re-Education  NMR 1: Patient education: HEP compliance, swelling management, ROM expectations post TKA, importance of extension ROM for proper gait cycle  NMR 2: LAQs at EOM + 2 lb AW: 10 second hold, 5 minutes  NMR 6: Standing tke with ball: 10 second hold, 20 reps  Therapeutic Activity  TA 2: Lateral walks at half wall: 6 laps  TA 3: Step ups on to 6-inch step: 3 x 10 reps  TA 4: Patient education: DOMS, load progression, activity modification  TA 5: Recumbent Bike for gait mechanics; 5 minutes, Lvl 4  TA 6: Shuttle DL Press: 50 lbs, 3 x 10 reps -- SL Press: 12.5 lbs, 3 x 10 reps RLE only    Time Entry(in minutes):  Neuromuscular Re-Education Time Entry:  12  Therapeutic Activity Time Entry: 48    Assessment & Plan   Assessment: Good tolerance overall noting adequate muscle response throughout. She is progressing well with current phase of rehab.  Evaluation/Treatment Tolerance: Patient tolerated treatment well    The patient will continue to benefit from skilled outpatient physical therapy in order to address the deficits listed in the problem list on the initial evaluation, provide patient and family education, and maximize the patients level of independence in the home and community environments.     The patient's spiritual, cultural, and educational needs were considered, and the patient is agreeable to the plan of care and goals.           Plan: Will continue per POC towards treatment goals. PT/PTA met face to face to discuss patient's treatment plan and progress towards established goals. Patient will be seen by physical therapist every sixth visit and minimally once per month.    Goals:   Active       Ambulation/movement       Patient will walk for 20 minutes with least restrictive assistive device and report </= 3/10 pain (Progressing)       Start:  05/14/25    Expected End:  07/02/25               Changing body position       Patient will demonstrate moving sit to stand 10x in 30 seconds to demonstrate functional mobility (Progressing)       Start:  05/14/25    Expected End:  07/02/25               Functional outcome       Patient will show a significant change in FOTO patient-reported outcome tool to demonstrate subjective improvement (Progressing)       Start:  05/14/25    Expected End:  07/02/25            Patient stated goal: to return to prior level of function without pain  (Progressing)       Start:  05/14/25    Expected End:  07/02/25            Patient will demonstrate independence in home program for support of progression (Progressing)       Start:  05/14/25    Expected End:  07/02/25               Pain       Patient will report a 2 point  reduction in pain while performing physical therapy exercises (Progressing)       Start:  05/14/25    Expected End:  07/02/25               Range of Motion       Patient will achieve right knee ROM of  0-120 degrees  (Progressing)       Start:  05/14/25    Expected End:  07/02/25                Leni Amor PTA

## 2025-06-03 ENCOUNTER — CLINICAL SUPPORT (OUTPATIENT)
Dept: REHABILITATION | Facility: HOSPITAL | Age: 70
End: 2025-06-03
Payer: MEDICARE

## 2025-06-03 DIAGNOSIS — Z96.651 STATUS POST TOTAL RIGHT KNEE REPLACEMENT: Primary | ICD-10-CM

## 2025-06-03 PROCEDURE — 97530 THERAPEUTIC ACTIVITIES: CPT

## 2025-06-05 ENCOUNTER — CLINICAL SUPPORT (OUTPATIENT)
Dept: REHABILITATION | Facility: HOSPITAL | Age: 70
End: 2025-06-05
Payer: MEDICARE

## 2025-06-05 DIAGNOSIS — Z96.651 STATUS POST TOTAL RIGHT KNEE REPLACEMENT: Primary | ICD-10-CM

## 2025-06-05 PROCEDURE — 97530 THERAPEUTIC ACTIVITIES: CPT

## 2025-06-09 ENCOUNTER — CLINICAL SUPPORT (OUTPATIENT)
Dept: REHABILITATION | Facility: HOSPITAL | Age: 70
End: 2025-06-09
Payer: MEDICARE

## 2025-06-09 DIAGNOSIS — Z96.651 STATUS POST TOTAL RIGHT KNEE REPLACEMENT: Primary | ICD-10-CM

## 2025-06-09 PROCEDURE — 97530 THERAPEUTIC ACTIVITIES: CPT | Mod: KX

## 2025-06-11 ENCOUNTER — CLINICAL SUPPORT (OUTPATIENT)
Dept: REHABILITATION | Facility: HOSPITAL | Age: 70
End: 2025-06-11
Payer: MEDICARE

## 2025-06-11 DIAGNOSIS — Z96.651 STATUS POST TOTAL RIGHT KNEE REPLACEMENT: Primary | ICD-10-CM

## 2025-06-11 PROCEDURE — 97112 NEUROMUSCULAR REEDUCATION: CPT | Mod: KX

## 2025-06-11 PROCEDURE — 97530 THERAPEUTIC ACTIVITIES: CPT | Mod: KX

## 2025-06-11 NOTE — PROGRESS NOTES
"  Outpatient Rehab    Physical Therapy Visit    Patient Name: Verena Toscano  MRN: [id number]  YOB: 1955  Encounter Date: 6/9/2025    Therapy Diagnosis:   Encounter Diagnosis   Name Primary?    Status post total right knee replacement Yes     Physician: Navi Fernandez III, *    Physician Orders: Eval and Treat  Medical Diagnosis: Synovial cyst of popliteal space (Baker), right knee  Unilateral primary osteoarthritis, right knee  Pain in right knee  Surgical Diagnosis: Not applicable for this Episode   Surgical Date: Not applicable for this Episode    Visit # / Visits Authorized:  22 / 30  Insurance Authorization Period: 1/1/2025 to 12/31/2025  Date of Evaluation: 5/14/2025  Plan of Care Certification: 5/14/2025 to 7/2/2025      PT/PTA: PT   Number of PTA visits since last PT visit:0  Time In: 1100   Time Out: 1158  Total Time (in minutes): 58   Total Billable Time (in minutes):  28 minutes    FOTO:  See media section.     Precautions:  Right Lower Extremity Weight-Bearing Status: Weight-bearing as tolerated  Standard      Subjective   Patient reports pain in medial right knee region..         Objective            Treatment:  Balance/Neuromuscular Re-Education  NMR 1: Patient education: HEP compliance, swelling management, ROM expectations post TKA, importance of extension ROM for proper gait cycle  NMR 2: LAQs at EOM + 3 lb AW: 5 second hold, 3 x 10 reps each leg  NMR 5: Recumbent Bike for gait mechanics; 10 minutes, Lvl 3  NMR 6: Standing tke with ball: 10 second hold, 30 reps  Therapeutic Activity  TA 1: Sit to stands from elevated surface: 3 x 10 reps + 3 lb DB's - cues for weight shifting and terminal knee extension with use of mirror  TA 3: Reciprocal gait stair nevigation 6-inch step: 3 x 10 laps  TA 7: Pearl navigation forward (12"): 10 laps    Time Entry(in minutes):  Neuromuscular Re-Education Time Entry: 28  Therapeutic Activity Time Entry: 30    Assessment & Plan   Assessment: David" presents to physical therapy [facility] reports of improved reports of functional mobility. She tolerated today's session well with continued emphasis on functional strength. Will continue to progress as tolerated  Evaluation/Treatment Tolerance: Patient tolerated treatment well    The patient will continue to benefit from skilled outpatient physical therapy in order to address the deficits listed in the problem list on the initial evaluation, provide patient and family education, and maximize the patients level of independence in the home and community environments.     The patient's spiritual, cultural, and educational needs were considered, and the patient is agreeable to the plan of care and goals.           Plan: Reassess at next visit    Goals:   Active       Ambulation/movement       Patient will walk for 20 minutes with least restrictive assistive device and report </= 3/10 pain (Ongoing)       Start:  05/14/25    Expected End:  07/02/25               Changing body position       Patient will demonstrate moving sit to stand 10x in 30 seconds to demonstrate functional mobility (Ongoing)       Start:  05/14/25    Expected End:  07/02/25               Functional outcome       Patient will show a significant change in FOTO patient-reported outcome tool to demonstrate subjective improvement (Ongoing)       Start:  05/14/25    Expected End:  07/02/25            Patient stated goal: to return to prior level of function without pain  (Ongoing)       Start:  05/14/25    Expected End:  07/02/25            Patient will demonstrate independence in home program for support of progression (Ongoing)       Start:  05/14/25    Expected End:  07/02/25               Pain       Patient will report a 2 point reduction in pain while performing physical therapy exercises (Ongoing)       Start:  05/14/25    Expected End:  07/02/25               Range of Motion       Patient will achieve right knee ROM of  0-120 degrees  (Ongoing)        Start:  05/14/25    Expected End:  07/02/25                Lois Hebert PT, DPT

## 2025-06-12 NOTE — PROGRESS NOTES
"  Outpatient Rehab    Physical Therapy Visit    Patient Name: Verena Toscano  MRN: [id number]  YOB: 1955  Encounter Date: 6/11/2025    Therapy Diagnosis:   Encounter Diagnosis   Name Primary?    Status post total right knee replacement Yes     Physician: Navi Fernandez III, *    Physician Orders: Eval and Treat  Medical Diagnosis: Synovial cyst of popliteal space (Baker), right knee  Unilateral primary osteoarthritis, right knee  Pain in right knee  Surgical Diagnosis: Not applicable for this Episode   Surgical Date: Not applicable for this Episode    Visit # / Visits Authorized:  23 / 30  Insurance Authorization Period: 1/1/2025 to 12/31/2025  Date of Evaluation: 5/14/2025  Plan of Care Certification: 5/14/2025 to 7/2/2025      PT/PTA: PT   Number of PTA visits since last PT visit:0  Time In: 1103   Time Out: 1202  Total Time (in minutes): 59   Total Billable Time (in minutes):  59 minutes    FOTO:  See media section.     Precautions:       Subjective   Patient reports mild pain in medial right knee. She has been increasing her daily activity..         Objective            Treatment:  Balance/Neuromuscular Re-Education  NMR 1: Patient education: HEP compliance, swelling management, ROM expectations post TKA, importance of extension ROM for proper gait cycle  NMR 2: LAQs at EOM + 4 lb AW: 5 second hold, 3 x 10 reps each leg  NMR 5: Recumbent Bike for gait mechanics; 10 minutes, Lvl 4  NMR 6: Standing tke with ball: 10 second hold, 30 reps  Therapeutic Activity  TA 1: Sit to stands with staggared stance ( RL Posterior ) from elevated surface: 3 x 10 reps + 3 lb DB's - cues for weight shifting and terminal knee extension with use of mirror  TA 3: Reciprocal gait stair nevigation 4-inch step: 3 x 10 laps  TA 7: Pearl navigation forward (12"): 5 laps    Time Entry(in minutes):  Neuromuscular Re-Education Time Entry: 29  Therapeutic Activity Time Entry: 30    Assessment & Plan   Assessment: David" presents to physical therapy [facility] reports of improved reports of functional mobility outside of PT. PT provided education on maintaining rest as activity levels increase. She tolerated today's session well. She reports difficulty with descending stairs with reciprocal gait. Will continue to progress as tolerated  Evaluation/Treatment Tolerance: Patient tolerated treatment well    The patient will continue to benefit from skilled outpatient physical therapy in order to address the deficits listed in the problem list on the initial evaluation, provide patient and family education, and maximize the patients level of independence in the home and community environments.     The patient's spiritual, cultural, and educational needs were considered, and the patient is agreeable to the plan of care and goals.           Plan: Reassess at next visit    Goals:   Active       Ambulation/movement       Patient will walk for 20 minutes with least restrictive assistive device and report </= 3/10 pain (Ongoing)       Start:  05/14/25    Expected End:  07/02/25               Changing body position       Patient will demonstrate moving sit to stand 10x in 30 seconds to demonstrate functional mobility (Ongoing)       Start:  05/14/25    Expected End:  07/02/25               Functional outcome       Patient will show a significant change in FOTO patient-reported outcome tool to demonstrate subjective improvement (Ongoing)       Start:  05/14/25    Expected End:  07/02/25            Patient stated goal: to return to prior level of function without pain  (Ongoing)       Start:  05/14/25    Expected End:  07/02/25            Patient will demonstrate independence in home program for support of progression (Ongoing)       Start:  05/14/25    Expected End:  07/02/25               Pain       Patient will report a 2 point reduction in pain while performing physical therapy exercises (Ongoing)       Start:  05/14/25    Expected End:  07/02/25                Range of Motion       Patient will achieve right knee ROM of  0-120 degrees  (Ongoing)       Start:  05/14/25    Expected End:  07/02/25                Lois Hebert PT, DPT

## 2025-06-13 ENCOUNTER — CLINICAL SUPPORT (OUTPATIENT)
Dept: REHABILITATION | Facility: HOSPITAL | Age: 70
End: 2025-06-13
Payer: MEDICARE

## 2025-06-13 ENCOUNTER — OFFICE VISIT (OUTPATIENT)
Dept: PSYCHIATRY | Facility: CLINIC | Age: 70
End: 2025-06-13
Payer: MEDICARE

## 2025-06-13 DIAGNOSIS — Z96.651 STATUS POST TOTAL RIGHT KNEE REPLACEMENT: Primary | ICD-10-CM

## 2025-06-13 DIAGNOSIS — F43.23 ADJUSTMENT DISORDER WITH MIXED ANXIETY AND DEPRESSED MOOD: Primary | ICD-10-CM

## 2025-06-13 PROCEDURE — 99999 PR PBB SHADOW E&M-EST. PATIENT-LVL I: CPT | Mod: PBBFAC,,, | Performed by: PSYCHOLOGIST

## 2025-06-13 PROCEDURE — 97530 THERAPEUTIC ACTIVITIES: CPT | Mod: KX

## 2025-06-13 PROCEDURE — 99211 OFF/OP EST MAY X REQ PHY/QHP: CPT | Mod: PBBFAC | Performed by: PSYCHOLOGIST

## 2025-06-13 NOTE — PROGRESS NOTES
PSYCHO-ONCOLOGY NOTE/ Individual Psychotherapy     Date: 6/13/2025   Site of therapist:  [facility]              Therapeutic Intervention: Met with patient.  Outpatient - Behavior modifying psychotherapy 45 min - CPT code 02220  The patient's last visit with me was on 5/28/2025.    Problem list  Patient Active Problem List   Diagnosis    Cough    Cognitive complaints    Adjustment disorder with mixed anxiety and depressed mood    Cervical vertigo    Dyslipidemia    Essential hypertension    Hormone replacement therapy    Intertrigo    Neck pain    Vitamin D deficiency    Vulvitis    Caregiver stress    Right knee pain    S/P total knee arthroplasty       Chief complaint/reason for encounter: anxiety, depression  Met with patient to evaluate psychosocial adaptation to decreased mobility, caregiving    Current Medications  Current Outpatient Medications   Medication    acetaminophen (TYLENOL) 500 MG tablet    ammonium lactate 12 % Crea    econazole nitrate 1 % cream    estradiol (ESTRACE) 1 MG tablet    loratadine (CLARITIN) 10 mg tablet    methocarbamoL (ROBAXIN) 750 MG Tab    nystatin (MYCOSTATIN) cream    omeprazole (PRILOSEC) 10 MG capsule    triamterene-hydrochlorothiazide 37.5-25 mg (DYAZIDE) 37.5-25 mg per capsule     No current facility-administered medications for this visit.       Objective:  Verena Toscano arrived promptly for the session.  Ms. Toscano was independently ambulatory at the time of session. The patient was fully cooperative throughout the session.  Appearance: age appropriate, casually  dressed, well groomed  Behavior/Cooperation: friendly and cooperative  Speech: normal in rate, volume, and tone and appropriate quality, quantity and organization of sentences  Mood:euthymic  Affect: tearful  Thought Process: goal-directed, logical  Thought Content: normal,  No delusions or paranoia; did not appear to be responding to internal stimuli during the session  Orientation: grossly  intact  Memory: Grossly intact  Attention Span/Concentration: Attends to session without distraction; reports no difficulty  Fund of Knowledge: average  Estimate of Intelligence: average from verbal skills and history  Cognition: grossly intact  Insight: patient has awareness of illness; good insight into own behavior and behavior of others  Judgment: the patient's behavior is adequate to circumstances    Interval history and content of current session: Patient discussed events and activities since the time of last visit. Her knee replacement recovery is going well. Outpatient PT is challenging, but manageable. She discussed her trip to [place], 35th anniversary, and preparation for her daughter's baby shower. She is doing well and noting decreased rumination and improved sleep.      Prior: discussion of sexual changes: Enjoyable and fulfilling sex life prior to 's illness. Historical use of Viagra, Cialis. 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 Since Padcev, his ability to have an erection (or ejaculate without an erection) is almost gone. She feels his interest is low (although they still cuddle and show other physical affection). She wonders about his thinking about their change in interaction and whether he is interested in other potential assistive devices for intercourse. (Her drive has not changed, is comfortable masturbating, unsure how he thinks about her sexual interest).     Risk parameters:   Patient reports no suicidal ideation  Patient reports no homicidal ideation  Patient reports no self-injurious behavior  Patient reports no violent behavior   Safety needs:  None at this time      Verbal deficits: None     Patient's response to intervention:The patient's response to intervention is accepting, motivated.     Progress toward goals: Progress as Expected        Patient reported outcomes:      Distress thermometer:        5/28/2025    11:21 AM 5/15/2025     4:52 PM 4/29/2025     3:37 PM 4/1/2025     8:06 PM 3/19/2025    12:57 PM 3/4/2025     3:10 PM 2/20/2025     5:23 PM   DISTRESS SCREENING   Distress Score 3 3 10 - Extreme Distress 3 4 3 7   Practical Concerns Taking care of myself;Taking care of others;Work;Finances;Safety Taking care of myself;Taking care of others;Work;Finances;Safety Taking care of myself;Taking care of others;Work;Housing/Utilities;Finances;Treatment decisions Taking care of myself;Taking care of others;Finances Taking care of myself;Taking care of others;Work;Finances;Treatment decisions;Safety Taking care of others;Finances;Safety Taking care of myself;Taking care of others;Work;Safety;Finances   Social Concerns None of these Relationship with family members Relationship with spouse or partner;Relationship with children None of these None of these Relationship with children None of these   Emotional Concerns Worry or anxiety;Sadness or depression;Fear;Anger Worry or anxiety;Loneliness;Feelings of worthlessness or being a burden Worry or anxiety;Fear;Loneliness;Anger;Feelings of worthlessness or being a burden Worry or anxiety;Changes in appearance Worry or anxiety;Sadness or depression;Anger;Feelings of worthlessness or being a burden Fear;Changes in appearance;Feelings of worthlessness or being a burden Worry or anxiety;Sadness or depression;Grief or loss;Fear;Feelings of worthlessness or being a burden   Spiritual or Buddhism Concerns None of these None of these None of these None of these None of these None of these None of these   Physical Concerns Pain;Sleep;Fatigue;Loss or change of physical abilities Pain;Sleep;Changes in eating;Loss or change of physical abilities Pain;Fatigue;Changes in eating;Loss or change of physical abilities Pain;Fatigue;Loss or change of physical abilities Pain;Sleep;Fatigue;None of these Pain;Fatigue;Loss or change of physical abilities Pain;Fatigue;Changes in  eating;Loss or change of physical abilities           PHQ-9=PHQ ANSWERS    Q1. Little interest or pleasure in doing things: Not at all (06/13/25 1024)  Q2. Feeling down, depressed, or hopeless: Not at all (06/13/25 1024)  Q3. Trouble falling or staying asleep, or sleeping too much: Several days (06/13/25 1024)  Q4. Feeling tired or having little energy: Several days (06/13/25 1024)  Q5. Poor appetite or overeating: Several days (06/13/25 1024)  Q6. Feeling bad about yourself - or that you are a failure or have let yourself or your family down: Several days (06/13/25 1024)  Q7. Trouble concentrating on things, such as reading the newspaper or watching television: Not at all (06/13/25 1024)  Q8. Moving or speaking so slowly that other people could have noticed. Or the opposite - being so fidgety or restless that you have been moving around a lot more than usual: Not at all (06/13/25 1024)  Q9.      PHQ8 Score : 4 (06/13/25 1024)  PHQ-9 Total Score: 4 (06/13/25 1024)      ; Initial visit: 7       DEANNA-7=3 Initial visit:6       5/28/2025     3:09 PM 5/28/2025    11:23 AM 5/15/2025     4:55 PM   GAD7   1. Feeling nervous, anxious, or on edge? 0 1 2   2. Not being able to stop or control worrying? 0 1 1   3. Worrying too much about different things? 0 0 1   4. Trouble relaxing? 0 1 1   5. Being so restless that it is hard to sit still? 0 0 0   6. Becoming easily annoyed or irritable? 0 0 1   7. Feeling afraid as if something awful might happen? 0 0 1   8. If you checked off any problems, how difficult have these problems made it for you to do your work, take care of things at home, or get along with other people? 0 1 1   DEANNA-7 Score 0  3  7       Patient-reported          Client Strengths: verbal, intelligent, successful, good social support, good insight, commitment to wellness      Treatment Plan:individual psychotherapy  Target symptoms: adjustment  Why chosen therapy is appropriate versus another modality: relevant to  diagnosis, patient responds to this modality, evidence based practice  Outcome monitoring methods: self-report, checklist/rating scale  Therapeutic intervention type: behavior modifying psychotherapy  Prognosis: Good    Goals from last visit: Met   Behavioral goals prior to next visit:    Exercise: as per PT   Stress management: increase prayer focus, PMR   Social engagement:    Nutrition:    Smoking Cessation:   Therapy:  journaling      Return to clinic: 1 month     Length of Service (minutes direct face-to-face contact): 45    Diagnosis:     ICD-10-CM ICD-9-CM   1. Adjustment disorder with mixed anxiety and depressed mood  F43.23 [phone]               Xavier Núñez, PhD  [place] License #[id number]  [place] License #[id number]

## 2025-06-13 NOTE — PROGRESS NOTES
"  Outpatient Rehab    Physical Therapy Visit    Patient Name: Verena Toscano  MRN: [id number]  YOB: 1955  Encounter Date: 6/13/2025    Therapy Diagnosis:   Encounter Diagnosis   Name Primary?    Status post total right knee replacement Yes     Physician: Navi Fernandez III, *    Physician Orders: Eval and Treat  Medical Diagnosis: Synovial cyst of popliteal space (Baker), right knee  Unilateral primary osteoarthritis, right knee  Pain in right knee  Surgical Diagnosis: Not applicable for this Episode   Surgical Date: Not applicable for this Episode    Visit # / Visits Authorized:  24 / 30  Insurance Authorization Period: 1/1/2025 to 12/31/2025  Date of Evaluation: 5/14/2025  Plan of Care Certification: 5/14/2025 to 7/2/2025      PT/PTA: PT   Number of PTA visits since last PT visit:0  Time In: 0807   Time Out: 0900  Total Time (in minutes): 53   Total Billable Time (in minutes):  29 minutes    FOTO:  See media section.     Precautions:  Right Lower Extremity Weight-Bearing Status: Weight-bearing as tolerated         Subjective   Patient reports having difficulty ascending stairs without BUE assistance. She went to [facility] yesterday without any AD..         Objective            Treatment:  Balance/Neuromuscular Re-Education  NMR 2: LAQs at EOM + 4 lb AW: 5 second hold, 3 x 10 reps each leg  NMR 5: Recumbent Bike for gait mechanics; 10 minutes, Lvl 4  Therapeutic Activity  TA 2: Lateral walks at half wall (OrangeTB): 5 laps  TA 6: Shuttle DL Press: 50 lbs, 3 x 10 reps -- SL Press: 12.5-25 lbs, 3 x 10 reps BLE  TA 8: Step ups without BUE assist (4") fwd/lat: 3 x 10 reps each    Time Entry(in minutes):  Neuromuscular Re-Education Time Entry: 15  Therapeutic Activity Time Entry: 38    Assessment & Plan   Assessment: David presents to physical therapy [facility] reports of improved reports of functional mobility outside of PT. Progressing stair navigation to challenge balance with decreased BUE " assistance. Patient required cues for terminal knee extension during step ups. Will continue to progress as tolerated  Evaluation/Treatment Tolerance: Patient tolerated treatment well    The patient will continue to benefit from skilled outpatient physical therapy in order to address the deficits listed in the problem list on the initial evaluation, provide patient and family education, and maximize the patients level of independence in the home and community environments.     The patient's spiritual, cultural, and educational needs were considered, and the patient is agreeable to the plan of care and goals.           Plan: Reassess at next visit    Goals:   Active       Ambulation/movement       Patient will walk for 20 minutes with least restrictive assistive device and report </= 3/10 pain (Ongoing)       Start:  05/14/25    Expected End:  07/02/25               Changing body position       Patient will demonstrate moving sit to stand 10x in 30 seconds to demonstrate functional mobility (Ongoing)       Start:  05/14/25    Expected End:  07/02/25               Functional outcome       Patient will show a significant change in FOTO patient-reported outcome tool to demonstrate subjective improvement (Ongoing)       Start:  05/14/25    Expected End:  07/02/25            Patient stated goal: to return to prior level of function without pain  (Ongoing)       Start:  05/14/25    Expected End:  07/02/25            Patient will demonstrate independence in home program for support of progression (Ongoing)       Start:  05/14/25    Expected End:  07/02/25               Pain       Patient will report a 2 point reduction in pain while performing physical therapy exercises (Ongoing)       Start:  05/14/25    Expected End:  07/02/25               Range of Motion       Patient will achieve right knee ROM of  0-120 degrees  (Ongoing)       Start:  05/14/25    Expected End:  07/02/25                Lois Hebert, PT, DPT

## 2025-06-16 ENCOUNTER — CLINICAL SUPPORT (OUTPATIENT)
Dept: REHABILITATION | Facility: HOSPITAL | Age: 70
End: 2025-06-16
Payer: MEDICARE

## 2025-06-16 DIAGNOSIS — Z96.651 STATUS POST TOTAL RIGHT KNEE REPLACEMENT: Primary | ICD-10-CM

## 2025-06-16 PROCEDURE — 97530 THERAPEUTIC ACTIVITIES: CPT | Mod: KX

## 2025-06-16 NOTE — PROGRESS NOTES
"  Outpatient Rehab    Physical Therapy Visit    Patient Name: Verena Toscano  MRN: [id number]  YOB: 1955  Encounter Date: 6/16/2025    Therapy Diagnosis:   Encounter Diagnosis   Name Primary?    Status post total right knee replacement Yes     Physician: Navi Fernandez III, *    Physician Orders: Eval and Treat  Medical Diagnosis: Synovial cyst of popliteal space (Baker), right knee  Unilateral primary osteoarthritis, right knee  Pain in right knee  Surgical Diagnosis: R TKA   Surgical Date: 4/24/2025  Days Since Last Surgery: 53    Visit # / Visits Authorized:  25 / 30  Insurance Authorization Period: 1/1/2025 to 12/31/2025  Date of Evaluation: 5/14/2025  Plan of Care Certification: 5/14/2025 to 7/2/2025      PT/PTA: PT   Number of PTA visits since last PT visit:0  Time In: 0902   Time Out: 1000  Total Time (in minutes): 58   Total Billable Time (in minutes):  31 minutes    FOTO:  Intake Score:  %  Survey Score 2:  %  Survey Score 3: 64%    Precautions:  Right Lower Extremity Weight-Bearing Status: Weight-bearing as tolerated  Standard      Subjective   Patient reports feeling tired from standing all day Saturday for her daughter's baby shower. She did not elevate her legs after. She also reported going swimming in her pool..         Objective            Treatment:  Balance/Neuromuscular Re-Education  NMR 1: Patient education: HEP compliance, swelling management, ROM expectations post TKA, importance of extension ROM for proper gait cycle  NMR 2: LAQs at EOM + 4 lb AW: 5 second hold, 3 x 12 reps each leg  NMR 5: Recumbent Bike for gait mechanics; 10 minutes, Lvl 4  NMR 6: Standing tke with ball: 10 second hold, 30 reps  Therapeutic Activity  TA 2: Lateral walks at half wall (OrangeTB): 5 laps  TA 8: Step ups without BUE assist (4") fwd/lat: 3 x 10 reps each  TA 9: FOTO administered    Time Entry(in minutes):  Neuromuscular Re-Education Time Entry: 25  Therapeutic Activity Time Entry: " 35    Assessment & Plan   Assessment: David presents to physical therapy with reports of increased activity over the weekend. Administered FOTO, which initially reflected decreased score from previous intake. PT educated patient on considering her level of activity outside of this past weekend, and score only decreased 2 points. She tolerated today's session well, and she still requires cues to put weight through right lower extremity for stair navigation. Will continue to progress as tolerated.  Evaluation/Treatment Tolerance: Patient tolerated treatment well    The patient will continue to benefit from skilled outpatient physical therapy in order to address the deficits listed in the problem list on the initial evaluation, provide patient and family education, and maximize the patients level of independence in the home and community environments.     The patient's spiritual, cultural, and educational needs were considered, and the patient is agreeable to the plan of care and goals.           Plan: Reassess at next visit    Goals:   Active       Ambulation/movement       Patient will walk for 20 minutes with least restrictive assistive device and report </= 3/10 pain (Ongoing)       Start:  05/14/25    Expected End:  07/02/25               Changing body position       Patient will demonstrate moving sit to stand 10x in 30 seconds to demonstrate functional mobility (Ongoing)       Start:  05/14/25    Expected End:  07/02/25               Functional outcome       Patient will show a significant change in FOTO patient-reported outcome tool to demonstrate subjective improvement (Ongoing)       Start:  05/14/25    Expected End:  07/02/25            Patient stated goal: to return to prior level of function without pain  (Ongoing)       Start:  05/14/25    Expected End:  07/02/25            Patient will demonstrate independence in home program for support of progression (Ongoing)       Start:  05/14/25    Expected End:   07/02/25               Pain       Patient will report a 2 point reduction in pain while performing physical therapy exercises (Ongoing)       Start:  05/14/25    Expected End:  07/02/25               Range of Motion       Patient will achieve right knee ROM of  0-120 degrees  (Ongoing)       Start:  05/14/25    Expected End:  07/02/25                Lois Hebert, PT, DPT

## 2025-06-18 ENCOUNTER — CLINICAL SUPPORT (OUTPATIENT)
Dept: REHABILITATION | Facility: HOSPITAL | Age: 70
End: 2025-06-18
Payer: MEDICARE

## 2025-06-18 DIAGNOSIS — Z96.651 STATUS POST TOTAL RIGHT KNEE REPLACEMENT: Primary | ICD-10-CM

## 2025-06-18 PROCEDURE — 97530 THERAPEUTIC ACTIVITIES: CPT | Mod: KX

## 2025-06-18 PROCEDURE — 97112 NEUROMUSCULAR REEDUCATION: CPT | Mod: KX

## 2025-06-18 NOTE — PROGRESS NOTES
"  Outpatient Rehab    Physical Therapy Progress Note    Patient Name: Verena Toscano  MRN: [id number]  YOB: 1955  Encounter Date: 6/18/2025    Therapy Diagnosis:   Encounter Diagnosis   Name Primary?    Status post total right knee replacement Yes     Physician: Navi Fernandez III, *    Physician Orders: Eval and Treat  Medical Diagnosis: Synovial cyst of popliteal space (Baker), right knee  Unilateral primary osteoarthritis, right knee  Pain in right knee  Surgical Diagnosis: R TKA   Surgical Date: 4/24/2025  Days Since Last Surgery: 55    Visit # / Visits Authorized:  26 / 30  Insurance Authorization Period: 1/1/2025 to 12/31/2025  Date of Evaluation: 5/14/2025  Plan of Care Certification: 5/14/2025 to 7/2/2025      PT/PTA: PT   Number of PTA visits since last PT visit:0  Time In: 0806   Time Out: 0859  Total Time (in minutes): 53   Total Billable Time (in minutes):  53 minutes    FOTO:  See media section.     Precautions:  Right Lower Extremity Weight-Bearing Status: Weight-bearing as tolerated  Standard      Subjective   Patient reports no new pain..         Objective         Timed Up & Go (TUG)  Time: 9 seconds     An older adult who takes >=12 seconds to complete the TUG is at risk for falling.       Sit to Stand Testing      The patient completed 8 repetitions of a sit to stand transfer in 30 seconds. no UE support              Treatment:  Balance/Neuromuscular Re-Education  NMR 1: Patient education: HEP compliance, swelling management, ROM expectations post TKA, importance of extension ROM for proper gait cycle  NMR 5: Recumbent Bike for gait mechanics; 10 minutes, Lvl 5  NMR 7: Straight leg raises: 1 x 5 reps, 5 second hold each leg  Therapeutic Activity  TA 6: Shuttle DL Press: 62.5 lbs, 3 x 10 reps -- SL Press: 12.5 lbs, 3 x 10 reps BLE  TA 8: Step ups without BUE assist (4") fwd/lat: 3 x 10 reps fwd, 3 x 10 reps lateral  TA 9: Reassessment    Time Entry(in minutes):  Neuromuscular " Copied from CRM #[id number]. Topic: MW Messaging - MW Patient Request  >> Nov 12, 2024  1:52 PM Cassandra SINGH wrote:  Crissy Walker called requesting to send a general message to clinician.   Verified issue is NOT regarding a symptom(s) requiring routine or emergent triage. Verified another message template type and CRM does not apply.    Selected 'Wrap Up CRM' and created new Telephone Encounter after clicking 'Convert to Clinical Call'. Selected appropriate Reason for Call.  Sent Pt message template and routed as routine priority per Clinician KB page to appropriate clinician pool. Readback full message.    -- DO NOT REPLY / DO NOT REPLY ALL --  -- This inbox is not monitored. If this was sent to the wrong provider or department, reroute message to P ECO Reroute pool. --  -- Message is from Engagement Center Operations (ECO) --    General Patient Message: Patient is requesting a call back from Dr. Tarun Doll in regards to her Colonoscopy results and also in regards to a Dexa Scan. She went over her Colonoscopy results with her Gastro provider but she would like to discuss it further with Dr. Doll. Also, on her last visit she saw one of Dr. Doll's residents in which she was given an order for a Dexa Scan. She will be calling her insurance company to find out about coverage. Please call patient back and assist.      Caller Information       Contact Date/Time Type Contact Phone/Fax    11/12/2024 01:47 PM CST Phone (Incoming) Crissy Walker [phone]            Alternative phone number: None     Can a detailed message be left? Yes - Voicemail     Patient has been advised the message will be addressed within 2-3 business days.                 Re-Education Time Entry: 23  Therapeutic Activity Time Entry: 30    Assessment & Plan   Assessment: Upon reassessment, David demonstrates improved gait speed and functional mobility. She tolerated today's session well with less reliance on bilateral upper extremities for step ups. Continues to require cues for terminal knee extension in closed chain exercises. Will continue to progress as tolerated.  Evaluation/Treatment Tolerance: Patient tolerated treatment well    The patient will continue to benefit from skilled outpatient physical therapy in order to address the deficits listed in the problem list on the initial evaluation, provide patient and family education, and maximize the patients level of independence in the home and community environments.     The patient's spiritual, cultural, and educational needs were considered, and the patient is agreeable to the plan of care and goals.           Plan: Consider TKE with band next visit    Goals:   Active       Ambulation/movement       Patient will walk for 20 minutes with least restrictive assistive device and report </= 3/10 pain (Ongoing)       Start:  05/14/25    Expected End:  07/02/25               Changing body position       Patient will demonstrate moving sit to stand 10x in 30 seconds to demonstrate functional mobility (Ongoing)       Start:  05/14/25    Expected End:  07/02/25               Functional outcome       Patient will show a significant change in FOTO patient-reported outcome tool to demonstrate subjective improvement (Ongoing)       Start:  05/14/25    Expected End:  07/02/25            Patient stated goal: to return to prior level of function without pain  (Ongoing)       Start:  05/14/25    Expected End:  07/02/25            Patient will demonstrate independence in home program for support of progression (Ongoing)       Start:  05/14/25    Expected End:  07/02/25               Pain       Patient will report a 2 point reduction in pain while  performing physical therapy exercises (Ongoing)       Start:  05/14/25    Expected End:  07/02/25               Range of Motion       Patient will achieve right knee ROM of  0-120 degrees  (Ongoing)       Start:  05/14/25    Expected End:  07/02/25                Lois Hebert, PT, DPT

## 2025-06-23 ENCOUNTER — CLINICAL SUPPORT (OUTPATIENT)
Dept: REHABILITATION | Facility: HOSPITAL | Age: 70
End: 2025-06-23
Payer: MEDICARE

## 2025-06-23 DIAGNOSIS — Z96.651 STATUS POST TOTAL RIGHT KNEE REPLACEMENT: Primary | ICD-10-CM

## 2025-06-23 PROCEDURE — 97530 THERAPEUTIC ACTIVITIES: CPT | Mod: KX

## 2025-06-23 PROCEDURE — 97112 NEUROMUSCULAR REEDUCATION: CPT | Mod: KX

## 2025-06-23 NOTE — PROGRESS NOTES
Outpatient Rehab    Physical Therapy Visit    Patient Name: Verena Toscano  MRN: [id number]  YOB: 1955  Encounter Date: 6/23/2025    Therapy Diagnosis:   Encounter Diagnosis   Name Primary?    Status post total right knee replacement Yes     Physician: Navi Fernandez III, *    Physician Orders: Eval and Treat  Medical Diagnosis: Synovial cyst of popliteal space (Baker), right knee  Unilateral primary osteoarthritis, right knee  Pain in right knee  Surgical Diagnosis: R TKA   Surgical Date: 4/24/2025  Days Since Last Surgery: 60    Visit # / Visits Authorized:  27 / 30  Insurance Authorization Period: 1/1/2025 to 12/31/2025  Date of Evaluation: 5/14/2025  Plan of Care Certification: 5/14/2025 to 7/2/2025      PT/PTA: PT   Number of PTA visits since last PT visit:0  Time In: 0859   Time Out: 0958  Total Time (in minutes): 59   Total Billable Time (in minutes):  59 minutes    FOTO:  See media section.     Precautions:     Standard      Subjective   Patient reports feeling right knee pain after getting into car over the weekend..    Right knee    Objective            Treatment:  Balance/Neuromuscular Re-Education  NMR 1: Patient education: HEP compliance, swelling management, ROM expectations post TKA, importance of extension ROM for proper gait cycle  NMR 2: LAQs at EOM + 3 lb AW: 5 second lowering, 3 x 12 reps each leg  NMR 5: Recumbent Bike for gait mechanics; 10 minutes, Lvl 5  NMR 6: Standing tke with ball: 10 second hold, 30 reps  Therapeutic Activity  TA 2: Lateral walks at half wall (OrangeTB): 5 laps  TA 3: Reciprocal gait stair nevigation 4-inch step: 3 x 10 laps  TA 4: Step downs (4 in): 4 x 5 reps, 5 seconds    Time Entry(in minutes):  Neuromuscular Re-Education Time Entry: 30  Therapeutic Activity Time Entry: 29    Assessment & Plan   Assessment: David reports to PT with no new reports of pain. She tolerated today's session well with functional strengthening. Continued to provided  education on home exercises. Will continue to progress as tolerated.  Evaluation/Treatment Tolerance: Patient tolerated treatment well    The patient will continue to benefit from skilled outpatient physical therapy in order to address the deficits listed in the problem list on the initial evaluation, provide patient and family education, and maximize the patients level of independence in the home and community environments.     The patient's spiritual, cultural, and educational needs were considered, and the patient is agreeable to the plan of care and goals.           Plan: Consider TKE with band next visit    Goals:   Active       Ambulation/movement       Patient will walk for 20 minutes with least restrictive assistive device and report </= 3/10 pain (Ongoing)       Start:  05/14/25    Expected End:  07/02/25               Changing body position       Patient will demonstrate moving sit to stand 10x in 30 seconds to demonstrate functional mobility (Ongoing)       Start:  05/14/25    Expected End:  07/02/25               Functional outcome       Patient will show a significant change in FOTO patient-reported outcome tool to demonstrate subjective improvement (Ongoing)       Start:  05/14/25    Expected End:  07/02/25            Patient stated goal: to return to prior level of function without pain  (Ongoing)       Start:  05/14/25    Expected End:  07/02/25            Patient will demonstrate independence in home program for support of progression (Ongoing)       Start:  05/14/25    Expected End:  07/02/25               Pain       Patient will report a 2 point reduction in pain while performing physical therapy exercises (Ongoing)       Start:  05/14/25    Expected End:  07/02/25               Range of Motion       Patient will achieve right knee ROM of  0-120 degrees  (Ongoing)       Start:  05/14/25    Expected End:  07/02/25                Lois Hebert, PT, DPT

## 2025-06-25 ENCOUNTER — CLINICAL SUPPORT (OUTPATIENT)
Dept: REHABILITATION | Facility: HOSPITAL | Age: 70
End: 2025-06-25
Payer: MEDICARE

## 2025-06-25 DIAGNOSIS — Z96.651 STATUS POST TOTAL RIGHT KNEE REPLACEMENT: Primary | ICD-10-CM

## 2025-06-25 PROCEDURE — 97530 THERAPEUTIC ACTIVITIES: CPT | Mod: KX

## 2025-06-26 NOTE — PROGRESS NOTES
"  Outpatient Rehab    Physical Therapy Visit    Patient Name: Verena Toscano  MRN: [id number]  YOB: 1955  Encounter Date: 6/25/2025    Therapy Diagnosis:   Encounter Diagnosis   Name Primary?    Status post total right knee replacement Yes     Physician: Navi Fernandez III, *    Physician Orders: Eval and Treat  Medical Diagnosis: Synovial cyst of popliteal space (Baker), right knee  Unilateral primary osteoarthritis, right knee  Pain in right knee  Surgical Diagnosis: R TKA   Surgical Date: 4/24/2025  Days Since Last Surgery: 63    Visit # / Visits Authorized:  28 / 30  Insurance Authorization Period: 1/1/2025 to 12/31/2025  Date of Evaluation: 5/14/2025  Plan of Care Certification: 5/14/2025 to 7/2/2025      PT/PTA: PT   Number of PTA visits since last PT visit:0  Time In: 0805   Time Out: 0900  Total Time (in minutes): 55   Total Billable Time (in minutes):  25 minutes    FOTO:  See media section.     Precautions:     Standard, RTKA      Subjective   Patient reports that her knee if feeling a little better than the other day..         Objective            Treatment:  Balance/Neuromuscular Re-Education  NMR 1: Patient education: HEP compliance, swelling management, ROM expectations post TKA, importance of extension ROM for proper gait cycle  NMR 5: Recumbent Bike for gait mechanics; 10 minutes, Lvl 5  Therapeutic Activity  TA 1: Sit to stands (5 lbs in each hand): 3 x 10 reps  TA 3: Reciprocal gait stair nevigation 4-inch step: 3 x 10 laps  TA 4: Step downs (4 in): 4 x 5 reps, 5 seconds  TA 7: Pearl taps (12"): 3 x 10 reps    Time Entry(in minutes):  Neuromuscular Re-Education Time Entry: 15  Therapeutic Activity Time Entry: 40    Assessment & Plan   Assessment: David reports to PT with no new reports of pain. She tolerated today's session well with emphasis on functional strengthening. Continued to provided education on home exercises. Will continue to progress as tolerated.       The patient " will continue to benefit from skilled outpatient physical therapy in order to address the deficits listed in the problem list on the initial evaluation, provide patient and family education, and maximize the patients level of independence in the home and community environments.     The patient's spiritual, cultural, and educational needs were considered, and the patient is agreeable to the plan of care and goals.           Plan: Consider TKE with band next visit    Goals:   Active       Ambulation/movement       Patient will walk for 20 minutes with least restrictive assistive device and report </= 3/10 pain (Ongoing)       Start:  05/14/25    Expected End:  07/02/25               Changing body position       Patient will demonstrate moving sit to stand 10x in 30 seconds to demonstrate functional mobility (Ongoing)       Start:  05/14/25    Expected End:  07/02/25               Functional outcome       Patient will show a significant change in FOTO patient-reported outcome tool to demonstrate subjective improvement (Ongoing)       Start:  05/14/25    Expected End:  07/02/25            Patient stated goal: to return to prior level of function without pain  (Ongoing)       Start:  05/14/25    Expected End:  07/02/25            Patient will demonstrate independence in home program for support of progression (Ongoing)       Start:  05/14/25    Expected End:  07/02/25               Pain       Patient will report a 2 point reduction in pain while performing physical therapy exercises (Ongoing)       Start:  05/14/25    Expected End:  07/02/25               Range of Motion       Patient will achieve right knee ROM of  0-120 degrees  (Ongoing)       Start:  05/14/25    Expected End:  07/02/25                Lois Hebert, PT, DPT

## 2025-06-30 ENCOUNTER — CLINICAL SUPPORT (OUTPATIENT)
Dept: REHABILITATION | Facility: HOSPITAL | Age: 70
End: 2025-06-30
Payer: MEDICARE

## 2025-06-30 DIAGNOSIS — Z96.651 STATUS POST TOTAL RIGHT KNEE REPLACEMENT: Primary | ICD-10-CM

## 2025-06-30 PROCEDURE — 97112 NEUROMUSCULAR REEDUCATION: CPT | Mod: KX

## 2025-06-30 PROCEDURE — 97530 THERAPEUTIC ACTIVITIES: CPT | Mod: KX

## 2025-06-30 NOTE — PROGRESS NOTES
Outpatient Rehab    Physical Therapy Visit    Patient Name: Verena Toscano  MRN: [id number]  YOB: 1955  Encounter Date: 6/30/2025    Therapy Diagnosis:   Encounter Diagnosis   Name Primary?    Status post total right knee replacement Yes     Physician: No ref. provider found    Physician Orders: Eval and Treat  Medical Diagnosis:   Surgical Diagnosis: R TKA   Surgical Date: 4/24/2025  Days Since Last Surgery: 67    Visit # / Visits Authorized:    Insurance Authorization Period:   Date of Evaluation: 5/14/2025  Plan of Care Certification: 5/14/2025 to 7/2/2025      PT/PTA: PT   Number of PTA visits since last PT visit:0  Time In: 0807   Time Out: 0901  Total Time (in minutes): 54   Total Billable Time (in minutes):  38 minutes    FOTO:  See media section.     Precautions:     Standard, RTKA      Subjective   Patient reports that she will return to MD office on Wednesday. She reports feeling worried about her knee pain occasionally..         Objective            Treatment:  Balance/Neuromuscular Re-Education  NMR 1: Patient education: HEP compliance, swelling management, ROM expectations post TKA, importance of extension ROM for proper gait cycle  NMR 2: LAQs at EOM + 3 lb AW: 5 second lowering, 3 x 12 reps  NMR 5: Recumbent Bike for gait mechanics; 10 minutes, Lvl 5  Therapeutic Activity  TA 1: Sit to stands (5 lbs in each hand): 3 x 10 reps  TA 2: Lateral walks at half wall (Orange[employer]): 5 laps  TA 3: Reciprocal gait stair nevigation 6-inch step: 3 x 10 laps  TA 6: Shuttle DL Press: 50 lbs, 3 x 12 reps -- SL Press: 25 lbs, 3 x 12 reps BLE    Time Entry(in minutes):  Neuromuscular Re-Education Time Entry: 23  Therapeutic Activity Time Entry: 31    Assessment & Plan   Assessment: David reports to PT with no new reports of pain. Continued to progress functional strengthening. PT provided education on total knee arthroplasty advanced home exercises. Will continue to progress as tolerated. Prepare for  upcoming discharge.   Evaluation/Treatment Tolerance: Patient tolerated treatment well    The patient will continue to benefit from skilled outpatient physical therapy in order to address the deficits listed in the problem list on the initial evaluation, provide patient and family education, and maximize the patients level of independence in the home and community environments.     The patient's spiritual, cultural, and educational needs were considered, and the patient is agreeable to the plan of care and goals.           Plan: Discharge at next visit    Goals:   Active       Ambulation/movement       Patient will walk for 20 minutes with least restrictive assistive device and report </= 3/10 pain (Ongoing)       Start:  05/14/25    Expected End:  07/02/25               Changing body position       Patient will demonstrate moving sit to stand 10x in 30 seconds to demonstrate functional mobility (Ongoing)       Start:  05/14/25    Expected End:  07/02/25               Functional outcome       Patient will show a significant change in FOTO patient-reported outcome tool to demonstrate subjective improvement (Ongoing)       Start:  05/14/25    Expected End:  07/02/25            Patient stated goal: to return to prior level of function without pain  (Ongoing)       Start:  05/14/25    Expected End:  07/02/25            Patient will demonstrate independence in home program for support of progression (Ongoing)       Start:  05/14/25    Expected End:  07/02/25               Pain       Patient will report a 2 point reduction in pain while performing physical therapy exercises (Ongoing)       Start:  05/14/25    Expected End:  07/02/25               Range of Motion       Patient will achieve right knee ROM of  0-120 degrees  (Ongoing)       Start:  05/14/25    Expected End:  07/02/25                Lois Hebert, PT, DPT

## 2025-07-02 ENCOUNTER — CLINICAL SUPPORT (OUTPATIENT)
Dept: REHABILITATION | Facility: HOSPITAL | Age: 70
End: 2025-07-02
Payer: MEDICARE

## 2025-07-02 DIAGNOSIS — Z96.651 STATUS POST TOTAL RIGHT KNEE REPLACEMENT: Primary | ICD-10-CM

## 2025-07-02 PROCEDURE — 97530 THERAPEUTIC ACTIVITIES: CPT | Mod: KX

## 2025-07-02 PROCEDURE — 97112 NEUROMUSCULAR REEDUCATION: CPT | Mod: KX

## 2025-07-02 NOTE — PROGRESS NOTES
"  Outpatient Rehab    Physical Therapy Discharge    Patient Name: Verena Toscano  MRN: [id number]  YOB: 1955  Encounter Date: 7/2/2025    Therapy Diagnosis:   Encounter Diagnosis   Name Primary?    Status post total right knee replacement Yes     Physician: No ref. provider found    Physician Orders: Eval and Treat  Medical Diagnosis:   Surgical Diagnosis: R TKA   Surgical Date: 4/24/2025  Days Since Last Surgery: 69    Visit # / Visits Authorized:    Insurance Authorization Period:   Date of Evaluation: 5/14/2025  Plan of Care Certification: 5/14/2025 to 7/2/2025      PT/PTA: PT   Number of PTA visits since last PT visit:0  Time In: 0801   Time Out: 0856  Total Time (in minutes): 55   Total Billable Time (in minutes):  55 minutes    FOTO:  See media section.     Precautions:     Standard, RTKA      Subjective   Patient reports that she tried the exercises from the HEP packet. She reports pain in the night and then some knee stiffness in the morning. She is agreeable to discharge at this time..         Objective         Timed Up & Go (TUG)  Time: 9 seconds     An older adult who takes >=12 seconds to complete the TUG is at risk for falling.       Sit to Stand Testing      The patient completed 9 repetitions of a sit to stand transfer in 30 seconds.                Treatment:  Balance/Neuromuscular Re-Education  NMR 1: Patient education: HEP compliance, swelling management, ROM expectations post TKA, importance of extension ROM for proper gait cycle  NMR 2: LAQs at EOM + 3 lb AW: 5 second lowering, 3 x 12 reps  NMR 5: Recumbent Bike for gait mechanics; 10 minutes, Lvl 5  Therapeutic Activity  TA 6: Shuttle DL Press: 50 lbs, 3 x 12 reps -- SL Press: 12.5 lbs, 3 x 10 reps BLE  TA 7: Pearl taps (12"): 3 x 10 reps  TA 8: Step ups without BUE assist (6") fwd/lat: 3 x 10 reps fwd, 3 x 10 reps lateral  TA 9: Reassessment    Time Entry(in minutes):  Neuromuscular Re-Education Time Entry: 23  Therapeutic " Activity Time Entry: 32    Assessment & Plan   Assessment: David reports to PT with no new reports of pain. She demonstrates improved functional mobility with 30s STS. She demonstrates independence with her home exercises as well. She is agreeable to discharge at this time. She is returning to MD office today as well.   Evaluation/Treatment Tolerance: Patient tolerated treatment well    The patient's spiritual, cultural, and educational needs were considered, and the patient is agreeable to the plan of care and goals.           Plan: Patient is discharged from PT with [facility] at this time    Goals:   Active       Ambulation/movement       Patient will walk for 20 minutes with least restrictive assistive device and report </= 3/10 pain (Unable to Meet)       Start:  05/14/25    Expected End:  07/02/25               Changing body position       Patient will demonstrate moving sit to stand 10x in 30 seconds to demonstrate functional mobility (Unable to Meet)       Start:  05/14/25    Expected End:  07/02/25               Functional outcome       Patient will show a significant change in FOTO patient-reported outcome tool to demonstrate subjective improvement (Unable to Meet)       Start:  05/14/25    Expected End:  07/02/25            Patient stated goal: to return to prior level of function without pain  (Unable to Meet)       Start:  05/14/25    Expected End:  07/02/25            Patient will demonstrate independence in home program for support of progression (Met)       Start:  05/14/25    Expected End:  07/02/25    Resolved:  07/02/25           Resolved       Pain       Patient will report a 2 point reduction in pain while performing physical therapy exercises (Met)       Start:  05/14/25    Expected End:  07/02/25    Resolved:  07/02/25            Range of Motion       Patient will achieve right knee ROM of  0-120 degrees  (Met)       Start:  05/14/25    Expected End:  07/02/25    Resolved:  07/02/25              Lois Hebert, PT, DPT

## 2025-07-17 ENCOUNTER — OFFICE VISIT (OUTPATIENT)
Dept: PSYCHIATRY | Facility: CLINIC | Age: 70
End: 2025-07-17
Payer: MEDICARE

## 2025-07-17 DIAGNOSIS — F43.23 ADJUSTMENT DISORDER WITH MIXED ANXIETY AND DEPRESSED MOOD: Primary | ICD-10-CM

## 2025-07-17 PROCEDURE — 90834 PSYTX W PT 45 MINUTES: CPT | Mod: ,,, | Performed by: PSYCHOLOGIST

## 2025-07-17 PROCEDURE — 99211 OFF/OP EST MAY X REQ PHY/QHP: CPT | Mod: PBBFAC | Performed by: PSYCHOLOGIST

## 2025-07-17 PROCEDURE — 99999 PR PBB SHADOW E&M-EST. PATIENT-LVL I: CPT | Mod: PBBFAC,,, | Performed by: PSYCHOLOGIST

## 2025-07-17 NOTE — PROGRESS NOTES
PSYCHO-ONCOLOGY NOTE/ Individual Psychotherapy     Date: 7/17/2025   Site of therapist:  [facility]              Therapeutic Intervention: Met with patient.  Outpatient - Behavior modifying psychotherapy 45 min - CPT code 69999  The patient's last visit with me was on 6/13/2025.    Problem list  Patient Active Problem List   Diagnosis    Cough    Cognitive complaints    Adjustment disorder with mixed anxiety and depressed mood    Cervical vertigo    Dyslipidemia    Essential hypertension    Hormone replacement therapy    Intertrigo    Neck pain    Vitamin D deficiency    Vulvitis    Caregiver stress    Right knee pain    S/P total knee arthroplasty       Chief complaint/reason for encounter: anxiety, depression  Met with patient to evaluate psychosocial adaptation to decreased mobility, caregiving    Current Medications  Current Outpatient Medications   Medication    acetaminophen (TYLENOL) 500 MG tablet    ammonium lactate 12 % Crea    econazole nitrate 1 % cream    estradiol (ESTRACE) 1 MG tablet    loratadine (CLARITIN) 10 mg tablet    methocarbamoL (ROBAXIN) 750 MG Tab    nystatin (MYCOSTATIN) cream    omeprazole (PRILOSEC) 10 MG capsule    triamterene-hydrochlorothiazide 37.5-25 mg (DYAZIDE) 37.5-25 mg per capsule     No current facility-administered medications for this visit.       Objective:  Verena Toscano arrived promptly for the session.  Ms. Toscano was independently ambulatory at the time of session. The patient was fully cooperative throughout the session.  Appearance: age appropriate, casually  dressed, well groomed  Behavior/Cooperation: friendly and cooperative  Speech: normal in rate, volume, and tone and appropriate quality, quantity and organization of sentences  Mood:euthymic  Affect: euthymic  Thought Process: goal-directed, logical  Thought Content: normal,  No delusions or paranoia; did not appear to be responding to internal stimuli during the session  Orientation: grossly  intact  Memory: Grossly intact  Attention Span/Concentration: Attends to session without distraction; reports no difficulty  Fund of Knowledge: average  Estimate of Intelligence: average from verbal skills and history  Cognition: grossly intact  Insight: patient has awareness of illness; good insight into own behavior and behavior of others  Judgment: the patient's behavior is adequate to circumstances    Interval history and content of current session: Patient discussed events and activities since the time of last visit. Her son had a health incident which is worrying her. Her knee replacement recovery is going well. She is doing well, emotionally, and noting decreased rumination.  She is somewhat concerned about recent GI changes (but has not had life disruption or weight loss). She has connected with GI.    Prior: discussion of sexual changes: Enjoyable and fulfilling sex life prior to 's illness. Historical use of Viagra, Cialis. 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 Since Padcev, his ability to have an erection (or ejaculate without an erection) is almost gone. She feels his interest is low (although they still cuddle and show other physical affection). She wonders about his thinking about their change in interaction and whether he is interested in other potential assistive devices for intercourse. (Her drive has not changed, is comfortable masturbating, unsure how he thinks about her sexual interest).     Risk parameters:   Patient reports no suicidal ideation  Patient reports no homicidal ideation  Patient reports no self-injurious behavior  Patient reports no violent behavior   Safety needs:  None at this time      Verbal deficits: None     Patient's response to intervention:The patient's response to intervention is accepting, motivated.     Progress toward goals: Progress as Expected        Patient reported  outcomes:      Distress thermometer:       6/13/2025    10:22 AM 5/28/2025    11:21 AM 5/15/2025     4:52 PM 4/29/2025     3:37 PM 4/1/2025     8:06 PM 3/19/2025    12:57 PM 3/4/2025     3:10 PM   DISTRESS SCREENING   Distress Score 2 3 3 10 - Extreme Distress 3 4 3   Practical Concerns Taking care of myself;Taking care of others;Work;Housing/Utilities;Finances Taking care of myself;Taking care of others;Work;Finances;Safety Taking care of myself;Taking care of others;Work;Finances;Safety Taking care of myself;Taking care of others;Work;Housing/Utilities;Finances;Treatment decisions Taking care of myself;Taking care of others;Finances Taking care of myself;Taking care of others;Work;Finances;Treatment decisions;Safety Taking care of others;Finances;Safety   Social Concerns Relationship with children None of these Relationship with family members Relationship with spouse or partner;Relationship with children None of these None of these Relationship with children   Emotional Concerns Worry or anxiety Worry or anxiety;Sadness or depression;Fear;Anger Worry or anxiety;Loneliness;Feelings of worthlessness or being a burden Worry or anxiety;Fear;Loneliness;Anger;Feelings of worthlessness or being a burden Worry or anxiety;Changes in appearance Worry or anxiety;Sadness or depression;Anger;Feelings of worthlessness or being a burden Fear;Changes in appearance;Feelings of worthlessness or being a burden   Spiritual or Mu-ism Concerns None of these None of these None of these None of these None of these None of these None of these   Physical Concerns Pain;Sleep;Fatigue;Loss or change of physical abilities Pain;Sleep;Fatigue;Loss or change of physical abilities Pain;Sleep;Changes in eating;Loss or change of physical abilities Pain;Fatigue;Changes in eating;Loss or change of physical abilities Pain;Fatigue;Loss or change of physical abilities Pain;Sleep;Fatigue;None of these Pain;Fatigue;Loss or change of physical abilities            PHQ-9=2 (not meeting screener)       ; Initial visit: 7       DEANNA-7=4 Initial visit:6       6/13/2025    10:23 AM 5/28/2025     3:09 PM 5/28/2025    11:23 AM   GAD7   1. Feeling nervous, anxious, or on edge? 1 0 1   2. Not being able to stop or control worrying? 0 0 1   3. Worrying too much about different things? 0 0 0   4. Trouble relaxing? 0 0 1   5. Being so restless that it is hard to sit still? 0 0 0   6. Becoming easily annoyed or irritable? 1 0 0   7. Feeling afraid as if something awful might happen? 1 0 0   8. If you checked off any problems, how difficult have these problems made it for you to do your work, take care of things at home, or get along with other people? 1 0 1   DEANNA-7 Score 3 0  3        Patient-reported          Client Strengths: verbal, intelligent, successful, good social support, good insight, commitment to wellness      Treatment Plan:individual psychotherapy  Target symptoms: adjustment  Why chosen therapy is appropriate versus another modality: relevant to diagnosis, patient responds to this modality, evidence based practice  Outcome monitoring methods: self-report, checklist/rating scale  Therapeutic intervention type: behavior modifying psychotherapy  Prognosis: Good    Goals from last visit: Met   Behavioral goals prior to next visit:    Exercise: as per PT   Stress management:    Social engagement:    Nutrition:    Smoking Cessation:   Therapy:  taxes    Work on house sale plan      Return to clinic: 1 month     Length of Service (minutes direct face-to-face contact): 45    Diagnosis:     ICD-10-CM ICD-9-CM   1. Adjustment disorder with mixed anxiety and depressed mood  F43.23 [phone]               Xavier Núñez, PhD  [place] License #[id number]  [place] License #[id number]

## 2025-08-20 ENCOUNTER — OFFICE VISIT (OUTPATIENT)
Dept: PSYCHIATRY | Facility: CLINIC | Age: 70
End: 2025-08-20
Payer: MEDICARE

## 2025-08-20 DIAGNOSIS — F43.23 ADJUSTMENT DISORDER WITH MIXED ANXIETY AND DEPRESSED MOOD: Primary | ICD-10-CM

## 2025-08-20 PROCEDURE — 90837 PSYTX W PT 60 MINUTES: CPT | Mod: ,,, | Performed by: PSYCHOLOGIST

## 2025-08-20 PROCEDURE — 99212 OFFICE O/P EST SF 10 MIN: CPT | Mod: PBBFAC | Performed by: PSYCHOLOGIST

## 2025-08-20 PROCEDURE — 99999 PR PBB SHADOW E&M-EST. PATIENT-LVL II: CPT | Mod: PBBFAC,,, | Performed by: PSYCHOLOGIST

## 2025-08-21 ENCOUNTER — HOSPITAL ENCOUNTER (INPATIENT)
Facility: OTHER | Age: 70
LOS: 4 days | Discharge: HOME | DRG: 643 | End: 2025-08-26
Attending: EMERGENCY MEDICINE | Admitting: HOSPITALIST
Payer: MEDICARE

## 2025-08-21 ENCOUNTER — NURSE TRIAGE (OUTPATIENT)
Dept: ADMINISTRATIVE | Facility: CLINIC | Age: 70
End: 2025-08-21
Payer: MEDICARE

## 2025-08-21 DIAGNOSIS — E22.2 SIADH (SYNDROME OF INAPPROPRIATE ADH PRODUCTION): Primary | ICD-10-CM

## 2025-08-21 DIAGNOSIS — R53.81 DEBILITY: ICD-10-CM

## 2025-08-21 DIAGNOSIS — E87.1 HYPONATREMIA: ICD-10-CM

## 2025-08-21 LAB
ABSOLUTE EOSINOPHIL (OHS): 0.14 K/UL
ABSOLUTE MONOCYTE (OHS): 0.86 K/UL (ref 0.3–1)
ABSOLUTE NEUTROPHIL COUNT (OHS): 5.27 K/UL (ref 1.8–7.7)
BASOPHILS # BLD AUTO: 0.05 K/UL
BASOPHILS NFR BLD AUTO: 0.6 %
ERYTHROCYTE [DISTWIDTH] IN BLOOD BY AUTOMATED COUNT: 12.3 % (ref 11.5–14.5)
HCT VFR BLD AUTO: 36.8 % (ref 37–48.5)
HGB BLD-MCNC: 12.9 GM/DL (ref 12–16)
IMM GRANULOCYTES # BLD AUTO: 0.02 K/UL (ref 0–0.04)
IMM GRANULOCYTES NFR BLD AUTO: 0.3 % (ref 0–0.5)
LYMPHOCYTES # BLD AUTO: 1.48 K/UL (ref 1–4.8)
MCH RBC QN AUTO: 29.7 PG (ref 27–31)
MCHC RBC AUTO-ENTMCNC: 35.1 G/DL (ref 32–36)
MCV RBC AUTO: 85 FL (ref 82–98)
NUCLEATED RBC (/100WBC) (OHS): 0 /100 WBC
PLATELET # BLD AUTO: 273 K/UL (ref 150–450)
PMV BLD AUTO: 8.9 FL (ref 9.2–12.9)
RBC # BLD AUTO: 4.34 M/UL (ref 4–5.4)
RELATIVE EOSINOPHIL (OHS): 1.8 %
RELATIVE LYMPHOCYTE (OHS): 18.9 % (ref 18–48)
RELATIVE MONOCYTE (OHS): 11 % (ref 4–15)
RELATIVE NEUTROPHIL (OHS): 67.4 % (ref 38–73)
WBC # BLD AUTO: 7.82 K/UL (ref 3.9–12.7)

## 2025-08-21 PROCEDURE — 25000003 PHARM REV CODE 250: Performed by: EMERGENCY MEDICINE

## 2025-08-21 PROCEDURE — 96360 HYDRATION IV INFUSION INIT: CPT

## 2025-08-21 PROCEDURE — 87502 INFLUENZA DNA AMP PROBE: CPT

## 2025-08-21 PROCEDURE — 80053 COMPREHEN METABOLIC PANEL: CPT | Performed by: EMERGENCY MEDICINE

## 2025-08-21 PROCEDURE — 99285 EMERGENCY DEPT VISIT HI MDM: CPT

## 2025-08-21 PROCEDURE — 87635 SARS-COV-2 COVID-19 AMP PRB: CPT | Performed by: EMERGENCY MEDICINE

## 2025-08-21 PROCEDURE — 85025 COMPLETE CBC W/AUTO DIFF WBC: CPT | Performed by: EMERGENCY MEDICINE

## 2025-08-21 PROCEDURE — 96361 HYDRATE IV INFUSION ADD-ON: CPT

## 2025-08-21 RX ORDER — SODIUM CHLORIDE 9 MG/ML
1000 INJECTION, SOLUTION INTRAVENOUS
Status: COMPLETED | OUTPATIENT
Start: 2025-08-21 | End: 2025-08-22

## 2025-08-21 RX ORDER — KETOROLAC TROMETHAMINE 30 MG/ML
10 INJECTION, SOLUTION INTRAMUSCULAR; INTRAVENOUS
Status: DISCONTINUED | OUTPATIENT
Start: 2025-08-21 | End: 2025-08-22

## 2025-08-21 RX ORDER — KETOROLAC TROMETHAMINE 10 MG/1
10 TABLET, FILM COATED ORAL
Status: DISCONTINUED | OUTPATIENT
Start: 2025-08-21 | End: 2025-08-22

## 2025-08-21 RX ADMIN — SODIUM CHLORIDE 1000 ML: 9 INJECTION, SOLUTION INTRAVENOUS at 11:08

## 2025-08-22 PROBLEM — E87.1 HYPONATREMIA: Status: ACTIVE | Noted: 2025-08-22

## 2025-08-22 PROBLEM — E87.6 HYPOKALEMIA: Status: ACTIVE | Noted: 2025-08-22

## 2025-08-22 PROBLEM — E87.1 HYPONATREMIA: Status: RESOLVED | Noted: 2025-08-22 | Resolved: 2025-08-22

## 2025-08-22 PROBLEM — D64.9 NORMOCYTIC ANEMIA: Status: ACTIVE | Noted: 2025-08-22

## 2025-08-22 PROBLEM — E22.2 SIADH (SYNDROME OF INAPPROPRIATE ADH PRODUCTION): Status: ACTIVE | Noted: 2025-08-22

## 2025-08-22 LAB
ABSOLUTE EOSINOPHIL (OHS): 0.13 K/UL
ABSOLUTE MONOCYTE (OHS): 0.82 K/UL (ref 0.3–1)
ABSOLUTE NEUTROPHIL COUNT (OHS): 4.12 K/UL (ref 1.8–7.7)
ALBUMIN SERPL BCP-MCNC: 3.6 G/DL (ref 3.5–5.2)
ALBUMIN SERPL BCP-MCNC: 4.1 G/DL (ref 3.5–5.2)
ALP SERPL-CCNC: 52 UNIT/L (ref 40–150)
ALP SERPL-CCNC: 61 UNIT/L (ref 40–150)
ALT SERPL W/O P-5'-P-CCNC: 8 UNIT/L (ref 10–44)
ALT SERPL W/O P-5'-P-CCNC: 9 UNIT/L (ref 10–44)
ANION GAP (OHS): 6 MMOL/L (ref 8–16)
ANION GAP (OHS): 7 MMOL/L (ref 8–16)
ANION GAP (OHS): 9 MMOL/L (ref 8–16)
AST SERPL-CCNC: 17 UNIT/L (ref 11–45)
AST SERPL-CCNC: 27 UNIT/L (ref 11–45)
BASOPHILS # BLD AUTO: 0.04 K/UL
BASOPHILS NFR BLD AUTO: 0.6 %
BILIRUB SERPL-MCNC: 0.6 MG/DL (ref 0.1–1)
BILIRUB SERPL-MCNC: 0.6 MG/DL (ref 0.1–1)
BILIRUB UR QL STRIP.AUTO: NEGATIVE
BUN SERPL-MCNC: 7 MG/DL (ref 8–23)
BUN SERPL-MCNC: 7 MG/DL (ref 8–23)
BUN SERPL-MCNC: 8 MG/DL (ref 8–23)
CALCIUM SERPL-MCNC: 8.8 MG/DL (ref 8.7–10.5)
CALCIUM SERPL-MCNC: 8.8 MG/DL (ref 8.7–10.5)
CALCIUM SERPL-MCNC: 9.4 MG/DL (ref 8.7–10.5)
CHLORIDE SERPL-SCNC: 92 MMOL/L (ref 95–110)
CHLORIDE SERPL-SCNC: 94 MMOL/L (ref 95–110)
CHLORIDE SERPL-SCNC: 94 MMOL/L (ref 95–110)
CLARITY UR: CLEAR
CO2 SERPL-SCNC: 23 MMOL/L (ref 23–29)
CO2 SERPL-SCNC: 25 MMOL/L (ref 23–29)
CO2 SERPL-SCNC: 27 MMOL/L (ref 23–29)
COLOR UR AUTO: YELLOW
CREAT SERPL-MCNC: 0.5 MG/DL (ref 0.5–1.4)
CREAT SERPL-MCNC: 0.6 MG/DL (ref 0.5–1.4)
CREAT SERPL-MCNC: 0.6 MG/DL (ref 0.5–1.4)
CTP QC/QA: YES
CTP QC/QA: YES
ERYTHROCYTE [DISTWIDTH] IN BLOOD BY AUTOMATED COUNT: 12.3 % (ref 11.5–14.5)
GFR SERPLBLD CREATININE-BSD FMLA CKD-EPI: >60 ML/MIN/1.73/M2
GLUCOSE SERPL-MCNC: 102 MG/DL (ref 70–110)
GLUCOSE SERPL-MCNC: 103 MG/DL (ref 70–110)
GLUCOSE SERPL-MCNC: 103 MG/DL (ref 70–110)
GLUCOSE UR QL STRIP: NEGATIVE
HCT VFR BLD AUTO: 34.3 % (ref 37–48.5)
HGB BLD-MCNC: 11.7 GM/DL (ref 12–16)
HGB UR QL STRIP: NEGATIVE
IMM GRANULOCYTES # BLD AUTO: 0.02 K/UL (ref 0–0.04)
IMM GRANULOCYTES NFR BLD AUTO: 0.3 % (ref 0–0.5)
KETONES UR QL STRIP: NEGATIVE
LEUKOCYTE ESTERASE UR QL STRIP: NEGATIVE
LYMPHOCYTES # BLD AUTO: 1.54 K/UL (ref 1–4.8)
MAGNESIUM SERPL-MCNC: 1.9 MG/DL (ref 1.6–2.6)
MCH RBC QN AUTO: 29 PG (ref 27–31)
MCHC RBC AUTO-ENTMCNC: 34.1 G/DL (ref 32–36)
MCV RBC AUTO: 85 FL (ref 82–98)
NITRITE UR QL STRIP: NEGATIVE
NUCLEATED RBC (/100WBC) (OHS): 0 /100 WBC
OSMOLALITY SERPL: 262 MOSM/KG (ref 275–295)
OSMOLALITY UR: 517 MOSM/KG (ref 50–1200)
PH UR STRIP: 7 [PH]
PHOSPHATE SERPL-MCNC: 3.3 MG/DL (ref 2.7–4.5)
PLATELET # BLD AUTO: 250 K/UL (ref 150–450)
PMV BLD AUTO: 9 FL (ref 9.2–12.9)
POC MOLECULAR INFLUENZA A AGN: NEGATIVE
POC MOLECULAR INFLUENZA B AGN: NEGATIVE
POTASSIUM SERPL-SCNC: 3.2 MMOL/L (ref 3.5–5.1)
POTASSIUM SERPL-SCNC: 3.6 MMOL/L (ref 3.5–5.1)
POTASSIUM SERPL-SCNC: 4.3 MMOL/L (ref 3.5–5.1)
PROT SERPL-MCNC: 6.1 GM/DL (ref 6–8.4)
PROT SERPL-MCNC: 6.8 GM/DL (ref 6–8.4)
PROT UR QL STRIP: NEGATIVE
RBC # BLD AUTO: 4.03 M/UL (ref 4–5.4)
RELATIVE EOSINOPHIL (OHS): 1.9 %
RELATIVE LYMPHOCYTE (OHS): 23.1 % (ref 18–48)
RELATIVE MONOCYTE (OHS): 12.3 % (ref 4–15)
RELATIVE NEUTROPHIL (OHS): 61.8 % (ref 38–73)
SARS-COV-2 RDRP RESP QL NAA+PROBE: NEGATIVE
SODIUM SERPL-SCNC: 124 MMOL/L (ref 136–145)
SODIUM SERPL-SCNC: 125 MMOL/L (ref 136–145)
SODIUM SERPL-SCNC: 126 MMOL/L (ref 136–145)
SODIUM SERPL-SCNC: 128 MMOL/L (ref 136–145)
SODIUM SERPL-SCNC: 131 MMOL/L (ref 136–145)
SODIUM SERPL-SCNC: 133 MMOL/L (ref 136–145)
SODIUM UR-SCNC: 116 MMOL/L (ref 20–250)
SP GR UR STRIP: 1.01
TSH SERPL-ACNC: 0.51 UIU/ML (ref 0.4–4)
URATE SERPL-MCNC: 3.1 MG/DL (ref 2.4–5.7)
UROBILINOGEN UR STRIP-ACNC: NEGATIVE EU/DL
WBC # BLD AUTO: 6.67 K/UL (ref 3.9–12.7)

## 2025-08-22 PROCEDURE — 83930 ASSAY OF BLOOD OSMOLALITY: CPT | Performed by: EMERGENCY MEDICINE

## 2025-08-22 PROCEDURE — 84295 ASSAY OF SERUM SODIUM: CPT | Performed by: NURSE PRACTITIONER

## 2025-08-22 PROCEDURE — 83935 ASSAY OF URINE OSMOLALITY: CPT | Performed by: EMERGENCY MEDICINE

## 2025-08-22 PROCEDURE — 12000002 HC ACUTE/MED SURGE SEMI-PRIVATE ROOM

## 2025-08-22 PROCEDURE — 85025 COMPLETE CBC W/AUTO DIFF WBC: CPT | Performed by: NURSE PRACTITIONER

## 2025-08-22 PROCEDURE — 82374 ASSAY BLOOD CARBON DIOXIDE: CPT | Performed by: EMERGENCY MEDICINE

## 2025-08-22 PROCEDURE — 84100 ASSAY OF PHOSPHORUS: CPT | Performed by: NURSE PRACTITIONER

## 2025-08-22 PROCEDURE — 25000003 PHARM REV CODE 250: Performed by: EMERGENCY MEDICINE

## 2025-08-22 PROCEDURE — 25000003 PHARM REV CODE 250: Performed by: NURSE PRACTITIONER

## 2025-08-22 PROCEDURE — 63600175 PHARM REV CODE 636 W HCPCS: Performed by: NURSE PRACTITIONER

## 2025-08-22 PROCEDURE — 83735 ASSAY OF MAGNESIUM: CPT | Performed by: NURSE PRACTITIONER

## 2025-08-22 PROCEDURE — 84300 ASSAY OF URINE SODIUM: CPT | Performed by: EMERGENCY MEDICINE

## 2025-08-22 PROCEDURE — 81003 URINALYSIS AUTO W/O SCOPE: CPT | Performed by: EMERGENCY MEDICINE

## 2025-08-22 PROCEDURE — 84443 ASSAY THYROID STIM HORMONE: CPT | Performed by: INTERNAL MEDICINE

## 2025-08-22 PROCEDURE — 36415 COLL VENOUS BLD VENIPUNCTURE: CPT | Performed by: NURSE PRACTITIONER

## 2025-08-22 PROCEDURE — 84550 ASSAY OF BLOOD/URIC ACID: CPT | Performed by: NURSE PRACTITIONER

## 2025-08-22 RX ORDER — ACETAMINOPHEN 325 MG/1
650 TABLET ORAL EVERY 4 HOURS PRN
Status: DISCONTINUED | OUTPATIENT
Start: 2025-08-22 | End: 2025-08-26 | Stop reason: HOSPADM

## 2025-08-22 RX ORDER — GLUCAGON 1 MG
1 KIT INJECTION
Status: DISCONTINUED | OUTPATIENT
Start: 2025-08-22 | End: 2025-08-26 | Stop reason: HOSPADM

## 2025-08-22 RX ORDER — NALOXONE HCL 0.4 MG/ML
0.02 VIAL (ML) INJECTION
Status: DISCONTINUED | OUTPATIENT
Start: 2025-08-22 | End: 2025-08-26 | Stop reason: HOSPADM

## 2025-08-22 RX ORDER — ENOXAPARIN SODIUM 100 MG/ML
40 INJECTION SUBCUTANEOUS EVERY 24 HOURS
Status: DISCONTINUED | OUTPATIENT
Start: 2025-08-22 | End: 2025-08-26 | Stop reason: HOSPADM

## 2025-08-22 RX ORDER — POTASSIUM CHLORIDE 20 MEQ/1
40 TABLET, EXTENDED RELEASE ORAL
Status: COMPLETED | OUTPATIENT
Start: 2025-08-22 | End: 2025-08-22

## 2025-08-22 RX ORDER — IBUPROFEN 200 MG
16 TABLET ORAL
Status: DISCONTINUED | OUTPATIENT
Start: 2025-08-22 | End: 2025-08-26 | Stop reason: HOSPADM

## 2025-08-22 RX ORDER — IBUPROFEN 200 MG
24 TABLET ORAL
Status: DISCONTINUED | OUTPATIENT
Start: 2025-08-22 | End: 2025-08-26 | Stop reason: HOSPADM

## 2025-08-22 RX ORDER — SODIUM CHLORIDE 0.9 % (FLUSH) 0.9 %
10 SYRINGE (ML) INJECTION EVERY 8 HOURS PRN
Status: DISCONTINUED | OUTPATIENT
Start: 2025-08-22 | End: 2025-08-26 | Stop reason: HOSPADM

## 2025-08-22 RX ORDER — TOLVAPTAN 15 MG/1
15 TABLET ORAL ONCE
Status: COMPLETED | OUTPATIENT
Start: 2025-08-22 | End: 2025-08-22

## 2025-08-22 RX ORDER — ONDANSETRON HYDROCHLORIDE 2 MG/ML
4 INJECTION, SOLUTION INTRAVENOUS EVERY 8 HOURS PRN
Status: DISCONTINUED | OUTPATIENT
Start: 2025-08-22 | End: 2025-08-26 | Stop reason: HOSPADM

## 2025-08-22 RX ADMIN — POTASSIUM CHLORIDE 40 MEQ: 1500 TABLET, EXTENDED RELEASE ORAL at 12:08

## 2025-08-22 RX ADMIN — ENOXAPARIN SODIUM 40 MG: 40 INJECTION SUBCUTANEOUS at 05:08

## 2025-08-22 RX ADMIN — POTASSIUM CHLORIDE 40 MEQ: 1500 TABLET, EXTENDED RELEASE ORAL at 03:08

## 2025-08-22 RX ADMIN — TOLVAPTAN 15 MG: 15 TABLET ORAL at 08:08

## 2025-08-23 PROBLEM — G93.41 METABOLIC ENCEPHALOPATHY: Status: ACTIVE | Noted: 2025-08-23

## 2025-08-23 LAB
ABSOLUTE EOSINOPHIL (OHS): 0.09 K/UL
ABSOLUTE MONOCYTE (OHS): 0.94 K/UL (ref 0.3–1)
ABSOLUTE NEUTROPHIL COUNT (OHS): 5.08 K/UL (ref 1.8–7.7)
ALBUMIN SERPL BCP-MCNC: 3.5 G/DL (ref 3.5–5.2)
ALP SERPL-CCNC: 53 UNIT/L (ref 40–150)
ALT SERPL W/O P-5'-P-CCNC: <8 UNIT/L (ref 10–44)
ANION GAP (OHS): 9 MMOL/L (ref 8–16)
AST SERPL-CCNC: 12 UNIT/L (ref 11–45)
BASOPHILS # BLD AUTO: 0.05 K/UL
BASOPHILS NFR BLD AUTO: 0.6 %
BILIRUB SERPL-MCNC: 0.5 MG/DL (ref 0.1–1)
BUN SERPL-MCNC: 10 MG/DL (ref 8–23)
CALCIUM SERPL-MCNC: 8.9 MG/DL (ref 8.7–10.5)
CHLORIDE SERPL-SCNC: 98 MMOL/L (ref 95–110)
CO2 SERPL-SCNC: 23 MMOL/L (ref 23–29)
CORTIS SERPL-MCNC: 13.7 UG/DL
CORTIS SERPL-MCNC: 9.3 UG/DL
CREAT SERPL-MCNC: 0.6 MG/DL (ref 0.5–1.4)
ERYTHROCYTE [DISTWIDTH] IN BLOOD BY AUTOMATED COUNT: 12.2 % (ref 11.5–14.5)
GFR SERPLBLD CREATININE-BSD FMLA CKD-EPI: >60 ML/MIN/1.73/M2
GLUCOSE SERPL-MCNC: 108 MG/DL (ref 70–110)
HCT VFR BLD AUTO: 36.6 % (ref 37–48.5)
HGB BLD-MCNC: 13.2 GM/DL (ref 12–16)
HOLD SPECIMEN: NORMAL
IMM GRANULOCYTES # BLD AUTO: 0.01 K/UL (ref 0–0.04)
IMM GRANULOCYTES NFR BLD AUTO: 0.1 % (ref 0–0.5)
LYMPHOCYTES # BLD AUTO: 1.57 K/UL (ref 1–4.8)
MAGNESIUM SERPL-MCNC: 2 MG/DL (ref 1.6–2.6)
MCH RBC QN AUTO: 29.9 PG (ref 27–31)
MCHC RBC AUTO-ENTMCNC: 36.1 G/DL (ref 32–36)
MCV RBC AUTO: 83 FL (ref 82–98)
NUCLEATED RBC (/100WBC) (OHS): 0 /100 WBC
PHOSPHATE SERPL-MCNC: 3.6 MG/DL (ref 2.7–4.5)
PLATELET # BLD AUTO: 275 K/UL (ref 150–450)
PMV BLD AUTO: 8.9 FL (ref 9.2–12.9)
POTASSIUM SERPL-SCNC: 3.9 MMOL/L (ref 3.5–5.1)
PROT SERPL-MCNC: 6 GM/DL (ref 6–8.4)
RBC # BLD AUTO: 4.41 M/UL (ref 4–5.4)
RELATIVE EOSINOPHIL (OHS): 1.2 %
RELATIVE LYMPHOCYTE (OHS): 20.3 % (ref 18–48)
RELATIVE MONOCYTE (OHS): 12.1 % (ref 4–15)
RELATIVE NEUTROPHIL (OHS): 65.7 % (ref 38–73)
SODIUM SERPL-SCNC: 130 MMOL/L (ref 136–145)
SODIUM SERPL-SCNC: 132 MMOL/L (ref 136–145)
WBC # BLD AUTO: 7.74 K/UL (ref 3.9–12.7)

## 2025-08-23 PROCEDURE — 82533 TOTAL CORTISOL: CPT | Performed by: INTERNAL MEDICINE

## 2025-08-23 PROCEDURE — 84100 ASSAY OF PHOSPHORUS: CPT | Performed by: NURSE PRACTITIONER

## 2025-08-23 PROCEDURE — 21400001 HC TELEMETRY ROOM

## 2025-08-23 PROCEDURE — 63600175 PHARM REV CODE 636 W HCPCS: Performed by: NURSE PRACTITIONER

## 2025-08-23 PROCEDURE — 25000003 PHARM REV CODE 250: Performed by: NURSE PRACTITIONER

## 2025-08-23 PROCEDURE — 36415 COLL VENOUS BLD VENIPUNCTURE: CPT | Performed by: INTERNAL MEDICINE

## 2025-08-23 PROCEDURE — 80053 COMPREHEN METABOLIC PANEL: CPT | Performed by: NURSE PRACTITIONER

## 2025-08-23 PROCEDURE — 84295 ASSAY OF SERUM SODIUM: CPT | Performed by: HOSPITALIST

## 2025-08-23 PROCEDURE — 36415 COLL VENOUS BLD VENIPUNCTURE: CPT | Performed by: NURSE PRACTITIONER

## 2025-08-23 PROCEDURE — 63600175 PHARM REV CODE 636 W HCPCS: Performed by: INTERNAL MEDICINE

## 2025-08-23 PROCEDURE — 36415 COLL VENOUS BLD VENIPUNCTURE: CPT | Performed by: HOSPITALIST

## 2025-08-23 PROCEDURE — 25000003 PHARM REV CODE 250: Performed by: HOSPITALIST

## 2025-08-23 PROCEDURE — 82024 ASSAY OF ACTH: CPT | Performed by: INTERNAL MEDICINE

## 2025-08-23 PROCEDURE — 85025 COMPLETE CBC W/AUTO DIFF WBC: CPT | Performed by: NURSE PRACTITIONER

## 2025-08-23 PROCEDURE — 83735 ASSAY OF MAGNESIUM: CPT | Performed by: NURSE PRACTITIONER

## 2025-08-23 RX ORDER — NIFEDIPINE 30 MG/1
30 TABLET, EXTENDED RELEASE ORAL DAILY
Status: DISCONTINUED | OUTPATIENT
Start: 2025-08-23 | End: 2025-08-26 | Stop reason: HOSPADM

## 2025-08-23 RX ORDER — COSYNTROPIN 0.25 MG/ML
0.25 INJECTION, POWDER, FOR SOLUTION INTRAMUSCULAR; INTRAVENOUS ONCE
Status: COMPLETED | OUTPATIENT
Start: 2025-08-23 | End: 2025-08-23

## 2025-08-23 RX ADMIN — ENOXAPARIN SODIUM 40 MG: 40 INJECTION SUBCUTANEOUS at 04:08

## 2025-08-23 RX ADMIN — NIFEDIPINE 30 MG: 30 TABLET, FILM COATED, EXTENDED RELEASE ORAL at 08:08

## 2025-08-23 RX ADMIN — COSYNTROPIN 0.25 MG: 0.25 INJECTION, POWDER, LYOPHILIZED, FOR SOLUTION INTRAMUSCULAR; INTRAVENOUS at 04:08

## 2025-08-23 RX ADMIN — ACETAMINOPHEN 650 MG: 325 TABLET ORAL at 08:08

## 2025-08-23 RX ADMIN — ACETAMINOPHEN 650 MG: 325 TABLET ORAL at 04:08

## 2025-08-24 LAB
ALBUMIN SERPL BCP-MCNC: 3.6 G/DL (ref 3.5–5.2)
ALP SERPL-CCNC: 55 UNIT/L (ref 40–150)
ALT SERPL W/O P-5'-P-CCNC: <8 UNIT/L (ref 10–44)
ANION GAP (OHS): 10 MMOL/L (ref 8–16)
AST SERPL-CCNC: 12 UNIT/L (ref 11–45)
BILIRUB SERPL-MCNC: 0.5 MG/DL (ref 0.1–1)
BUN SERPL-MCNC: 12 MG/DL (ref 8–23)
CALCIUM SERPL-MCNC: 8.9 MG/DL (ref 8.7–10.5)
CHLORIDE SERPL-SCNC: 100 MMOL/L (ref 95–110)
CO2 SERPL-SCNC: 22 MMOL/L (ref 23–29)
CREAT SERPL-MCNC: 0.5 MG/DL (ref 0.5–1.4)
GFR SERPLBLD CREATININE-BSD FMLA CKD-EPI: >60 ML/MIN/1.73/M2
GLUCOSE SERPL-MCNC: 116 MG/DL (ref 70–110)
MAGNESIUM SERPL-MCNC: 2 MG/DL (ref 1.6–2.6)
PHOSPHATE SERPL-MCNC: 3.6 MG/DL (ref 2.7–4.5)
POTASSIUM SERPL-SCNC: 3.8 MMOL/L (ref 3.5–5.1)
PROT SERPL-MCNC: 6.2 GM/DL (ref 6–8.4)
SODIUM SERPL-SCNC: 132 MMOL/L (ref 136–145)

## 2025-08-24 PROCEDURE — 25000003 PHARM REV CODE 250: Performed by: HOSPITALIST

## 2025-08-24 PROCEDURE — 21400001 HC TELEMETRY ROOM

## 2025-08-24 PROCEDURE — 25000003 PHARM REV CODE 250: Performed by: NURSE PRACTITIONER

## 2025-08-24 PROCEDURE — 36415 COLL VENOUS BLD VENIPUNCTURE: CPT | Performed by: NURSE PRACTITIONER

## 2025-08-24 PROCEDURE — 83735 ASSAY OF MAGNESIUM: CPT | Performed by: NURSE PRACTITIONER

## 2025-08-24 PROCEDURE — 97535 SELF CARE MNGMENT TRAINING: CPT

## 2025-08-24 PROCEDURE — 80053 COMPREHEN METABOLIC PANEL: CPT | Performed by: NURSE PRACTITIONER

## 2025-08-24 PROCEDURE — 63600175 PHARM REV CODE 636 W HCPCS: Performed by: NURSE PRACTITIONER

## 2025-08-24 PROCEDURE — 84100 ASSAY OF PHOSPHORUS: CPT | Performed by: NURSE PRACTITIONER

## 2025-08-24 PROCEDURE — 97116 GAIT TRAINING THERAPY: CPT

## 2025-08-24 PROCEDURE — 97161 PT EVAL LOW COMPLEX 20 MIN: CPT

## 2025-08-24 PROCEDURE — 97166 OT EVAL MOD COMPLEX 45 MIN: CPT

## 2025-08-24 RX ADMIN — ACETAMINOPHEN 650 MG: 325 TABLET ORAL at 11:08

## 2025-08-24 RX ADMIN — NIFEDIPINE 30 MG: 30 TABLET, FILM COATED, EXTENDED RELEASE ORAL at 09:08

## 2025-08-24 RX ADMIN — ENOXAPARIN SODIUM 40 MG: 40 INJECTION SUBCUTANEOUS at 05:08

## 2025-08-24 RX ADMIN — ACETAMINOPHEN 650 MG: 325 TABLET ORAL at 03:08

## 2025-08-25 LAB
ABSOLUTE EOSINOPHIL (OHS): 0.13 K/UL
ABSOLUTE MONOCYTE (OHS): 0.55 K/UL (ref 0.3–1)
ABSOLUTE NEUTROPHIL COUNT (OHS): 4.02 K/UL (ref 1.8–7.7)
ALBUMIN SERPL BCP-MCNC: 3.6 G/DL (ref 3.5–5.2)
ALP SERPL-CCNC: 49 UNIT/L (ref 40–150)
ALT SERPL W/O P-5'-P-CCNC: <8 UNIT/L (ref 10–44)
ANION GAP (OHS): 9 MMOL/L (ref 8–16)
AST SERPL-CCNC: 13 UNIT/L (ref 11–45)
BASOPHILS # BLD AUTO: 0.05 K/UL
BASOPHILS NFR BLD AUTO: 0.8 %
BILIRUB SERPL-MCNC: 0.5 MG/DL (ref 0.1–1)
BUN SERPL-MCNC: 13 MG/DL (ref 8–23)
CALCIUM SERPL-MCNC: 8.9 MG/DL (ref 8.7–10.5)
CHLORIDE SERPL-SCNC: 102 MMOL/L (ref 95–110)
CO2 SERPL-SCNC: 22 MMOL/L (ref 23–29)
CREAT SERPL-MCNC: 0.5 MG/DL (ref 0.5–1.4)
ERYTHROCYTE [DISTWIDTH] IN BLOOD BY AUTOMATED COUNT: 12.1 % (ref 11.5–14.5)
GFR SERPLBLD CREATININE-BSD FMLA CKD-EPI: >60 ML/MIN/1.73/M2
GLUCOSE SERPL-MCNC: 100 MG/DL (ref 70–110)
HCT VFR BLD AUTO: 36.9 % (ref 37–48.5)
HGB BLD-MCNC: 13 GM/DL (ref 12–16)
IMM GRANULOCYTES # BLD AUTO: 0.01 K/UL (ref 0–0.04)
IMM GRANULOCYTES NFR BLD AUTO: 0.2 % (ref 0–0.5)
LYMPHOCYTES # BLD AUTO: 1.36 K/UL (ref 1–4.8)
MAGNESIUM SERPL-MCNC: 2.1 MG/DL (ref 1.6–2.6)
MCH RBC QN AUTO: 29.5 PG (ref 27–31)
MCHC RBC AUTO-ENTMCNC: 35.2 G/DL (ref 32–36)
MCV RBC AUTO: 84 FL (ref 82–98)
NUCLEATED RBC (/100WBC) (OHS): 0 /100 WBC
PHOSPHATE SERPL-MCNC: 3.3 MG/DL (ref 2.7–4.5)
PLATELET # BLD AUTO: 267 K/UL (ref 150–450)
PMV BLD AUTO: 8.8 FL (ref 9.2–12.9)
POTASSIUM SERPL-SCNC: 4 MMOL/L (ref 3.5–5.1)
PROT SERPL-MCNC: 6.1 GM/DL (ref 6–8.4)
RBC # BLD AUTO: 4.4 M/UL (ref 4–5.4)
RELATIVE EOSINOPHIL (OHS): 2.1 %
RELATIVE LYMPHOCYTE (OHS): 22.2 % (ref 18–48)
RELATIVE MONOCYTE (OHS): 9 % (ref 4–15)
RELATIVE NEUTROPHIL (OHS): 65.7 % (ref 38–73)
SODIUM SERPL-SCNC: 133 MMOL/L (ref 136–145)
WBC # BLD AUTO: 6.12 K/UL (ref 3.9–12.7)

## 2025-08-25 PROCEDURE — 85025 COMPLETE CBC W/AUTO DIFF WBC: CPT | Performed by: HOSPITALIST

## 2025-08-25 PROCEDURE — 25000003 PHARM REV CODE 250: Performed by: NURSE PRACTITIONER

## 2025-08-25 PROCEDURE — 63600175 PHARM REV CODE 636 W HCPCS: Performed by: NURSE PRACTITIONER

## 2025-08-25 PROCEDURE — 97116 GAIT TRAINING THERAPY: CPT

## 2025-08-25 PROCEDURE — 84100 ASSAY OF PHOSPHORUS: CPT | Performed by: HOSPITALIST

## 2025-08-25 PROCEDURE — 80053 COMPREHEN METABOLIC PANEL: CPT | Performed by: HOSPITALIST

## 2025-08-25 PROCEDURE — 21400001 HC TELEMETRY ROOM

## 2025-08-25 PROCEDURE — 25000003 PHARM REV CODE 250: Performed by: HOSPITALIST

## 2025-08-25 PROCEDURE — 97530 THERAPEUTIC ACTIVITIES: CPT

## 2025-08-25 PROCEDURE — 36415 COLL VENOUS BLD VENIPUNCTURE: CPT | Performed by: HOSPITALIST

## 2025-08-25 PROCEDURE — 97535 SELF CARE MNGMENT TRAINING: CPT

## 2025-08-25 PROCEDURE — 83735 ASSAY OF MAGNESIUM: CPT | Performed by: HOSPITALIST

## 2025-08-25 RX ORDER — SODIUM BICARBONATE 650 MG/1
650 TABLET ORAL ONCE
Status: COMPLETED | OUTPATIENT
Start: 2025-08-25 | End: 2025-08-25

## 2025-08-25 RX ADMIN — ENOXAPARIN SODIUM 40 MG: 40 INJECTION SUBCUTANEOUS at 04:08

## 2025-08-25 RX ADMIN — NIFEDIPINE 30 MG: 30 TABLET, FILM COATED, EXTENDED RELEASE ORAL at 08:08

## 2025-08-25 RX ADMIN — ACETAMINOPHEN 650 MG: 325 TABLET ORAL at 08:08

## 2025-08-25 RX ADMIN — SODIUM BICARBONATE 650 MG: 650 TABLET ORAL at 08:08

## 2025-08-26 VITALS
OXYGEN SATURATION: 99 % | HEIGHT: 66 IN | BODY MASS INDEX: 29.72 KG/M2 | SYSTOLIC BLOOD PRESSURE: 114 MMHG | DIASTOLIC BLOOD PRESSURE: 63 MMHG | HEART RATE: 83 BPM | TEMPERATURE: 98 F | RESPIRATION RATE: 18 BRPM | WEIGHT: 184.94 LBS

## 2025-08-26 LAB
ACTH (OHS): 11 PG/ML
ALBUMIN SERPL BCP-MCNC: 3.5 G/DL (ref 3.5–5.2)
AMMONIA PLAS-SCNC: 27 UMOL/L (ref 10–50)
ANION GAP (OHS): 9 MMOL/L (ref 8–16)
BUN SERPL-MCNC: 12 MG/DL (ref 8–23)
CALCIUM SERPL-MCNC: 8.9 MG/DL (ref 8.7–10.5)
CHLORIDE SERPL-SCNC: 102 MMOL/L (ref 95–110)
CO2 SERPL-SCNC: 24 MMOL/L (ref 23–29)
CREAT SERPL-MCNC: 0.5 MG/DL (ref 0.5–1.4)
GFR SERPLBLD CREATININE-BSD FMLA CKD-EPI: >60 ML/MIN/1.73/M2
GLUCOSE SERPL-MCNC: 107 MG/DL (ref 70–110)
PHOSPHATE SERPL-MCNC: 3.2 MG/DL (ref 2.7–4.5)
POTASSIUM SERPL-SCNC: 3.5 MMOL/L (ref 3.5–5.1)
SODIUM SERPL-SCNC: 135 MMOL/L (ref 136–145)
T PALLIDUM IGG+IGM SER QL: NORMAL
VIT B12 SERPL-MCNC: 292 PG/ML (ref 210–950)

## 2025-08-26 PROCEDURE — 82310 ASSAY OF CALCIUM: CPT | Performed by: NURSE PRACTITIONER

## 2025-08-26 PROCEDURE — 86593 SYPHILIS TEST NON-TREP QUANT: CPT | Performed by: HOSPITALIST

## 2025-08-26 PROCEDURE — 82140 ASSAY OF AMMONIA: CPT | Performed by: HOSPITALIST

## 2025-08-26 PROCEDURE — 84425 ASSAY OF VITAMIN B-1: CPT | Performed by: HOSPITALIST

## 2025-08-26 PROCEDURE — 25000003 PHARM REV CODE 250: Performed by: NURSE PRACTITIONER

## 2025-08-26 PROCEDURE — 97116 GAIT TRAINING THERAPY: CPT | Mod: CQ

## 2025-08-26 PROCEDURE — 36415 COLL VENOUS BLD VENIPUNCTURE: CPT | Performed by: NURSE PRACTITIONER

## 2025-08-26 PROCEDURE — 82607 VITAMIN B-12: CPT | Performed by: HOSPITALIST

## 2025-08-26 PROCEDURE — 36415 COLL VENOUS BLD VENIPUNCTURE: CPT | Performed by: HOSPITALIST

## 2025-08-26 PROCEDURE — 84207 ASSAY OF VITAMIN B-6: CPT | Performed by: HOSPITALIST

## 2025-08-26 RX ORDER — NIFEDIPINE 30 MG/1
30 TABLET, EXTENDED RELEASE ORAL DAILY
Qty: 30 TABLET | Refills: 1 | Status: SHIPPED | OUTPATIENT
Start: 2025-08-27 | End: 2026-08-27

## 2025-08-26 RX ADMIN — NIFEDIPINE 30 MG: 30 TABLET, FILM COATED, EXTENDED RELEASE ORAL at 08:08

## 2025-08-26 RX ADMIN — ACETAMINOPHEN 650 MG: 325 TABLET ORAL at 09:08

## 2025-08-27 ENCOUNTER — PATIENT OUTREACH (OUTPATIENT)
Dept: ADMINISTRATIVE | Facility: CLINIC | Age: 70
End: 2025-08-27
Payer: MEDICARE

## 2025-08-27 ENCOUNTER — TELEPHONE (OUTPATIENT)
Dept: PSYCHIATRY | Facility: CLINIC | Age: 70
End: 2025-08-27
Payer: MEDICARE

## 2025-08-29 LAB
W VITAMIN B1: 41 UG/L
W VITAMIN B6: 8 UG/L

## 2025-09-02 ENCOUNTER — TELEPHONE (OUTPATIENT)
Dept: ADMINISTRATIVE | Facility: CLINIC | Age: 70
End: 2025-09-02
Payer: MEDICARE

## 2025-09-03 ENCOUNTER — CLINICAL SUPPORT (OUTPATIENT)
Dept: REHABILITATION | Facility: HOSPITAL | Age: 70
End: 2025-09-03
Payer: MEDICARE

## 2025-09-03 ENCOUNTER — OFFICE VISIT (OUTPATIENT)
Dept: CARDIOLOGY | Facility: CLINIC | Age: 70
End: 2025-09-03
Payer: MEDICARE

## 2025-09-03 VITALS
HEART RATE: 80 BPM | DIASTOLIC BLOOD PRESSURE: 69 MMHG | WEIGHT: 177.69 LBS | BODY MASS INDEX: 27.89 KG/M2 | SYSTOLIC BLOOD PRESSURE: 115 MMHG | HEIGHT: 67 IN

## 2025-09-03 DIAGNOSIS — I10 ESSENTIAL HYPERTENSION: Primary | ICD-10-CM

## 2025-09-03 DIAGNOSIS — E87.6 HYPOKALEMIA: ICD-10-CM

## 2025-09-03 DIAGNOSIS — M25.561 RIGHT KNEE PAIN, UNSPECIFIED CHRONICITY: Primary | ICD-10-CM

## 2025-09-03 DIAGNOSIS — E22.2 SIADH (SYNDROME OF INAPPROPRIATE ADH PRODUCTION): ICD-10-CM

## 2025-09-03 DIAGNOSIS — E87.1 HYPONATREMIA: ICD-10-CM

## 2025-09-03 DIAGNOSIS — E78.5 DYSLIPIDEMIA: ICD-10-CM

## 2025-09-03 PROBLEM — K21.9 GASTRO-ESOPHAGEAL REFLUX DISEASE WITHOUT ESOPHAGITIS: Status: ACTIVE | Noted: 2025-09-03

## 2025-09-03 PROCEDURE — 97161 PT EVAL LOW COMPLEX 20 MIN: CPT | Mod: KX

## 2025-09-03 PROCEDURE — 97112 NEUROMUSCULAR REEDUCATION: CPT | Mod: KX

## 2025-09-03 PROCEDURE — 99999 PR PBB SHADOW E&M-EST. PATIENT-LVL III: CPT | Mod: PBBFAC,,, | Performed by: INTERNAL MEDICINE

## 2025-09-03 PROCEDURE — 99213 OFFICE O/P EST LOW 20 MIN: CPT | Mod: PBBFAC | Performed by: INTERNAL MEDICINE

## 2025-09-04 ENCOUNTER — TELEPHONE (OUTPATIENT)
Dept: ADMINISTRATIVE | Facility: CLINIC | Age: 70
End: 2025-09-04
Payer: MEDICARE